# Patient Record
Sex: FEMALE | Race: WHITE | NOT HISPANIC OR LATINO | URBAN - METROPOLITAN AREA
[De-identification: names, ages, dates, MRNs, and addresses within clinical notes are randomized per-mention and may not be internally consistent; named-entity substitution may affect disease eponyms.]

---

## 2017-04-19 ENCOUNTER — EMERGENCY (EMERGENCY)
Facility: HOSPITAL | Age: 82
LOS: 1 days | Discharge: HOME | End: 2017-04-19
Admitting: EMERGENCY MEDICINE
Payer: MEDICARE

## 2017-04-19 PROCEDURE — 70450 CT HEAD/BRAIN W/O DYE: CPT

## 2017-04-19 PROCEDURE — 99284 EMERGENCY DEPT VISIT MOD MDM: CPT | Mod: 25

## 2017-04-19 PROCEDURE — 12001 RPR S/N/AX/GEN/TRNK 2.5CM/<: CPT

## 2017-04-19 PROCEDURE — 70450 CT HEAD/BRAIN W/O DYE: CPT | Mod: 26

## 2017-04-19 PROCEDURE — 82962 GLUCOSE BLOOD TEST: CPT

## 2017-05-27 ENCOUNTER — INPATIENT (INPATIENT)
Facility: HOSPITAL | Age: 82
LOS: 4 days | Discharge: NURSING, REHAB OR LONG-TERM CARE | DRG: 554 | End: 2017-06-01
Attending: FAMILY MEDICINE | Admitting: INTERNAL MEDICINE
Payer: MEDICARE

## 2017-05-27 DIAGNOSIS — M19.90 UNSPECIFIED OSTEOARTHRITIS, UNSPECIFIED SITE: ICD-10-CM

## 2017-05-27 DIAGNOSIS — I10 ESSENTIAL (PRIMARY) HYPERTENSION: ICD-10-CM

## 2017-05-27 DIAGNOSIS — R50.9 FEVER, UNSPECIFIED: ICD-10-CM

## 2017-05-27 DIAGNOSIS — H40.9 UNSPECIFIED GLAUCOMA: ICD-10-CM

## 2017-05-27 DIAGNOSIS — F32.9 MAJOR DEPRESSIVE DISORDER, SINGLE EPISODE, UNSPECIFIED: ICD-10-CM

## 2017-05-27 DIAGNOSIS — R63.0 ANOREXIA: ICD-10-CM

## 2017-05-27 DIAGNOSIS — F03.90 UNSPECIFIED DEMENTIA WITHOUT BEHAVIORAL DISTURBANCE: ICD-10-CM

## 2017-05-27 DIAGNOSIS — I25.10 ATHEROSCLEROTIC HEART DISEASE OF NATIVE CORONARY ARTERY WITHOUT ANGINA PECTORIS: ICD-10-CM

## 2017-05-27 DIAGNOSIS — E11.9 TYPE 2 DIABETES MELLITUS WITHOUT COMPLICATIONS: ICD-10-CM

## 2017-05-27 DIAGNOSIS — M00.9 PYOGENIC ARTHRITIS, UNSPECIFIED: ICD-10-CM

## 2017-05-27 DIAGNOSIS — Z88.0 ALLERGY STATUS TO PENICILLIN: ICD-10-CM

## 2017-05-27 PROCEDURE — 99223 1ST HOSP IP/OBS HIGH 75: CPT

## 2017-05-27 PROCEDURE — 71010: CPT | Mod: 26

## 2017-05-27 PROCEDURE — 93010 ELECTROCARDIOGRAM REPORT: CPT

## 2017-05-27 PROCEDURE — 73562 X-RAY EXAM OF KNEE 3: CPT | Mod: 26,RT

## 2017-05-28 PROCEDURE — 99233 SBSQ HOSP IP/OBS HIGH 50: CPT

## 2017-05-29 PROCEDURE — 99232 SBSQ HOSP IP/OBS MODERATE 35: CPT

## 2017-05-30 PROCEDURE — 99232 SBSQ HOSP IP/OBS MODERATE 35: CPT

## 2017-05-30 PROCEDURE — 73562 X-RAY EXAM OF KNEE 3: CPT | Mod: 26,RT

## 2017-05-31 PROCEDURE — 20610 DRAIN/INJ JOINT/BURSA W/O US: CPT | Mod: RT

## 2017-05-31 PROCEDURE — 99232 SBSQ HOSP IP/OBS MODERATE 35: CPT

## 2017-06-01 PROCEDURE — 92507 TX SP LANG VOICE COMM INDIV: CPT

## 2017-06-01 PROCEDURE — 80048 BASIC METABOLIC PNL TOTAL CA: CPT

## 2017-06-01 PROCEDURE — 85025 COMPLETE CBC W/AUTO DIFF WBC: CPT

## 2017-06-01 PROCEDURE — 85652 RBC SED RATE AUTOMATED: CPT

## 2017-06-01 PROCEDURE — 71045 X-RAY EXAM CHEST 1 VIEW: CPT

## 2017-06-01 PROCEDURE — 83036 HEMOGLOBIN GLYCOSYLATED A1C: CPT

## 2017-06-01 PROCEDURE — 87070 CULTURE OTHR SPECIMN AEROBIC: CPT

## 2017-06-01 PROCEDURE — 86140 C-REACTIVE PROTEIN: CPT

## 2017-06-01 PROCEDURE — 96367 TX/PROPH/DG ADDL SEQ IV INF: CPT

## 2017-06-01 PROCEDURE — 85610 PROTHROMBIN TIME: CPT

## 2017-06-01 PROCEDURE — 85730 THROMBOPLASTIN TIME PARTIAL: CPT

## 2017-06-01 PROCEDURE — 97166 OT EVAL MOD COMPLEX 45 MIN: CPT

## 2017-06-01 PROCEDURE — 96365 THER/PROPH/DIAG IV INF INIT: CPT

## 2017-06-01 PROCEDURE — 89060 EXAM SYNOVIAL FLUID CRYSTALS: CPT

## 2017-06-01 PROCEDURE — 73562 X-RAY EXAM OF KNEE 3: CPT

## 2017-06-01 PROCEDURE — 81003 URINALYSIS AUTO W/O SCOPE: CPT

## 2017-06-01 PROCEDURE — 85027 COMPLETE CBC AUTOMATED: CPT

## 2017-06-01 PROCEDURE — 99232 SBSQ HOSP IP/OBS MODERATE 35: CPT

## 2017-06-01 PROCEDURE — 87040 BLOOD CULTURE FOR BACTERIA: CPT

## 2017-06-01 PROCEDURE — 87075 CULTR BACTERIA EXCEPT BLOOD: CPT

## 2017-06-01 PROCEDURE — 99285 EMERGENCY DEPT VISIT HI MDM: CPT | Mod: 25

## 2017-06-01 PROCEDURE — 83605 ASSAY OF LACTIC ACID: CPT

## 2017-06-01 PROCEDURE — 87205 SMEAR GRAM STAIN: CPT

## 2017-06-01 PROCEDURE — 87086 URINE CULTURE/COLONY COUNT: CPT

## 2017-06-01 PROCEDURE — 93005 ELECTROCARDIOGRAM TRACING: CPT

## 2017-06-01 PROCEDURE — 80069 RENAL FUNCTION PANEL: CPT

## 2017-06-01 PROCEDURE — 81002 URINALYSIS NONAUTO W/O SCOPE: CPT

## 2017-06-01 PROCEDURE — 97161 PT EVAL LOW COMPLEX 20 MIN: CPT

## 2017-06-01 PROCEDURE — 89051 BODY FLUID CELL COUNT: CPT

## 2017-08-21 ENCOUNTER — EMERGENCY (EMERGENCY)
Facility: HOSPITAL | Age: 82
LOS: 0 days | Discharge: NURSING, REHAB OR LONG-TERM CARE | End: 2017-08-21
Attending: EMERGENCY MEDICINE
Payer: MEDICARE

## 2017-08-21 VITALS
OXYGEN SATURATION: 97 % | DIASTOLIC BLOOD PRESSURE: 57 MMHG | HEART RATE: 60 BPM | SYSTOLIC BLOOD PRESSURE: 114 MMHG | TEMPERATURE: 98 F | RESPIRATION RATE: 17 BRPM

## 2017-08-21 VITALS
TEMPERATURE: 97 F | DIASTOLIC BLOOD PRESSURE: 82 MMHG | SYSTOLIC BLOOD PRESSURE: 189 MMHG | HEART RATE: 66 BPM | HEIGHT: 64 IN | WEIGHT: 119.93 LBS | OXYGEN SATURATION: 100 % | RESPIRATION RATE: 17 BRPM

## 2017-08-21 PROCEDURE — 70450 CT HEAD/BRAIN W/O DYE: CPT | Mod: 26

## 2017-08-21 PROCEDURE — 73562 X-RAY EXAM OF KNEE 3: CPT | Mod: 26,RT

## 2017-08-21 PROCEDURE — 72125 CT NECK SPINE W/O DYE: CPT | Mod: 26

## 2017-08-21 PROCEDURE — 99284 EMERGENCY DEPT VISIT MOD MDM: CPT

## 2017-08-21 PROCEDURE — 73502 X-RAY EXAM HIP UNI 2-3 VIEWS: CPT | Mod: 26,RT

## 2017-08-21 PROCEDURE — 71010: CPT | Mod: 26

## 2017-08-21 RX ORDER — ACETAMINOPHEN 500 MG
975 TABLET ORAL ONCE
Qty: 0 | Refills: 0 | Status: COMPLETED | OUTPATIENT
Start: 2017-08-21 | End: 2017-08-21

## 2017-08-21 RX ORDER — ACETAMINOPHEN 500 MG
2 TABLET ORAL
Qty: 56 | Refills: 0 | OUTPATIENT
Start: 2017-08-21 | End: 2017-08-28

## 2017-08-21 RX ORDER — TRAMADOL HYDROCHLORIDE 50 MG/1
1 TABLET ORAL
Qty: 12 | Refills: 0 | OUTPATIENT
Start: 2017-08-21 | End: 2017-08-24

## 2017-08-21 RX ADMIN — Medication 0.1 MILLIGRAM(S): at 06:21

## 2017-08-21 RX ADMIN — Medication 975 MILLIGRAM(S): at 05:35

## 2017-08-21 NOTE — ED ADULT NURSE REASSESSMENT NOTE - NS ED NURSE REASSESS COMMENT FT1
Patient at baseline, [facility] called and report was given to Nayely.  setting up transportation for patient to go back.

## 2017-08-21 NOTE — ED ADULT NURSE NOTE - OBJECTIVE STATEMENT
Pt states she fell forward out of the chair. The pt is not alert and oriented upon interview, so there may be some inaccuracy about the mechanism of injury and story that the patient is providing. The pt is alert and is oriented, but only A&OX2.

## 2017-08-21 NOTE — ED PROVIDER NOTE - PMH
Age Related Macular Degeneration  2008  Chronic Bronchitis with Emphysema  2009  Congestive Heart Failure  1997, 1999  Dementia    Diabetes Mellitus Type II    Fracture Lumbar Vertebra-Closed  8/2009  Glaucoma  bilateral on medication  Hyperlipidemia  1997  Myocardial Infarct, Old  1997  Osteoarthritis

## 2017-08-21 NOTE — ED PROVIDER NOTE - PSH
Colon Obstruction  1983 colon resection , benign  History of Hysterectomy  1975  History of Tonsillectomy  child  Ovarian Cyst  excision ovarian cyst 1970's

## 2017-08-21 NOTE — ED PROVIDER NOTE - OBJECTIVE STATEMENT
89 year old female presenting from Assisted living s/p fall - complaining of R hip discomfort/pain, otherwise found on floor from bedside at facility. Pt otherwise with hx of HLD, MI, DM II , CHF, dementia, presenting to ED due to fall, was found on ground.

## 2017-08-21 NOTE — ED PROVIDER NOTE - MUSCULOSKELETAL, MLM
Spine appears normal, range of motion is not limited, R hip with mild tenderness on flexion, no point tenderness, ROM of knee on R normal, left side without any
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
## 2017-08-22 DIAGNOSIS — M25.551 PAIN IN RIGHT HIP: ICD-10-CM

## 2017-08-22 DIAGNOSIS — M47.9 SPONDYLOSIS, UNSPECIFIED: ICD-10-CM

## 2017-08-22 DIAGNOSIS — I50.9 HEART FAILURE, UNSPECIFIED: ICD-10-CM

## 2017-08-22 DIAGNOSIS — Z91.81 HISTORY OF FALLING: ICD-10-CM

## 2017-08-22 DIAGNOSIS — F03.90 UNSPECIFIED DEMENTIA, UNSPECIFIED SEVERITY, WITHOUT BEHAVIORAL DISTURBANCE, PSYCHOTIC DISTURBANCE, MOOD DISTURBANCE, AND ANXIETY: ICD-10-CM

## 2017-08-22 DIAGNOSIS — Z88.0 ALLERGY STATUS TO PENICILLIN: ICD-10-CM
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
## 2017-08-22 DIAGNOSIS — J43.9 EMPHYSEMA, UNSPECIFIED: ICD-10-CM

## 2017-08-22 DIAGNOSIS — S70.01XA CONTUSION OF RIGHT HIP, INITIAL ENCOUNTER: ICD-10-CM

## 2017-08-22 DIAGNOSIS — E78.5 HYPERLIPIDEMIA, UNSPECIFIED: ICD-10-CM

## 2017-08-22 DIAGNOSIS — H40.9 UNSPECIFIED GLAUCOMA: ICD-10-CM

## 2017-11-06 ENCOUNTER — EMERGENCY (EMERGENCY)
Facility: HOSPITAL | Age: 82
LOS: 0 days | Discharge: HOME | End: 2017-11-06
Attending: EMERGENCY MEDICINE
Payer: MEDICARE

## 2017-11-06 VITALS
WEIGHT: 95.02 LBS | DIASTOLIC BLOOD PRESSURE: 84 MMHG | HEART RATE: 109 BPM | TEMPERATURE: 99 F | OXYGEN SATURATION: 98 % | SYSTOLIC BLOOD PRESSURE: 132 MMHG | RESPIRATION RATE: 16 BRPM | HEIGHT: 60 IN

## 2017-11-06 VITALS
DIASTOLIC BLOOD PRESSURE: 64 MMHG | RESPIRATION RATE: 17 BRPM | OXYGEN SATURATION: 97 % | HEART RATE: 65 BPM | TEMPERATURE: 98 F | SYSTOLIC BLOOD PRESSURE: 128 MMHG

## 2017-11-06 DIAGNOSIS — F32.9 MAJOR DEPRESSIVE DISORDER, SINGLE EPISODE, UNSPECIFIED: ICD-10-CM

## 2017-11-06 DIAGNOSIS — Z79.1 LONG TERM (CURRENT) USE OF NON-STEROIDAL ANTI-INFLAMMATORIES (NSAID): ICD-10-CM

## 2017-11-06 DIAGNOSIS — Z88.0 ALLERGY STATUS TO PENICILLIN: ICD-10-CM

## 2017-11-06 DIAGNOSIS — M25.552 PAIN IN LEFT HIP: ICD-10-CM

## 2017-11-06 DIAGNOSIS — M25.551 PAIN IN RIGHT HIP: ICD-10-CM

## 2017-11-06 DIAGNOSIS — H40.9 UNSPECIFIED GLAUCOMA: ICD-10-CM
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
## 2017-11-06 DIAGNOSIS — I10 ESSENTIAL (PRIMARY) HYPERTENSION: ICD-10-CM

## 2017-11-06 LAB
ALBUMIN SERPL ELPH-MCNC: 2.9 G/DL — LOW (ref 3.3–5)
ALP SERPL-CCNC: 104 U/L — SIGNIFICANT CHANGE UP (ref 40–120)
ALT FLD-CCNC: 18 U/L — SIGNIFICANT CHANGE UP (ref 12–78)
ANION GAP SERPL CALC-SCNC: 7 MMOL/L — SIGNIFICANT CHANGE UP (ref 5–17)
AST SERPL-CCNC: 20 U/L — SIGNIFICANT CHANGE UP (ref 15–37)
BILIRUB SERPL-MCNC: 0.5 MG/DL — SIGNIFICANT CHANGE UP (ref 0.2–1.2)
BUN SERPL-MCNC: 25 MG/DL — HIGH (ref 7–23)
CALCIUM SERPL-MCNC: 9.4 MG/DL — SIGNIFICANT CHANGE UP (ref 8.5–10.1)
CHLORIDE SERPL-SCNC: 108 MMOL/L — SIGNIFICANT CHANGE UP (ref 96–108)
CO2 SERPL-SCNC: 26 MMOL/L — SIGNIFICANT CHANGE UP (ref 22–31)
CREAT SERPL-MCNC: 1.27 MG/DL — SIGNIFICANT CHANGE UP (ref 0.5–1.3)
CRP SERPL-MCNC: 0.8 MG/DL — HIGH (ref 0–0.4)
ERYTHROCYTE [SEDIMENTATION RATE] IN BLOOD: 28 MM/HR — HIGH (ref 0–20)
GLUCOSE BLDC GLUCOMTR-MCNC: 105 MG/DL — HIGH (ref 70–99)
GLUCOSE SERPL-MCNC: 111 MG/DL — HIGH (ref 70–99)
HCT VFR BLD CALC: 36.6 % — SIGNIFICANT CHANGE UP (ref 34.5–45)
HGB BLD-MCNC: 12.3 G/DL — SIGNIFICANT CHANGE UP (ref 11.5–15.5)
LACTATE SERPL-SCNC: 0.9 MMOL/L — SIGNIFICANT CHANGE UP (ref 0.7–2)
LIDOCAIN IGE QN: 180 U/L — SIGNIFICANT CHANGE UP (ref 73–393)
MCHC RBC-ENTMCNC: 31 PG — SIGNIFICANT CHANGE UP (ref 27–34)
MCHC RBC-ENTMCNC: 33.6 GM/DL — SIGNIFICANT CHANGE UP (ref 32–36)
MCV RBC AUTO: 92.5 FL — SIGNIFICANT CHANGE UP (ref 80–100)
PLATELET # BLD AUTO: 187 K/UL — SIGNIFICANT CHANGE UP (ref 150–400)
POTASSIUM SERPL-MCNC: 4.2 MMOL/L — SIGNIFICANT CHANGE UP (ref 3.5–5.3)
POTASSIUM SERPL-SCNC: 4.2 MMOL/L — SIGNIFICANT CHANGE UP (ref 3.5–5.3)
PROT SERPL-MCNC: 6.5 GM/DL — SIGNIFICANT CHANGE UP (ref 6–8.3)
RBC # BLD: 3.96 M/UL — SIGNIFICANT CHANGE UP (ref 3.8–5.2)
RBC # FLD: 11.6 % — SIGNIFICANT CHANGE UP (ref 11–15)
SODIUM SERPL-SCNC: 141 MMOL/L — SIGNIFICANT CHANGE UP (ref 135–145)
WBC # BLD: 9.5 K/UL — SIGNIFICANT CHANGE UP (ref 3.8–10.5)
WBC # FLD AUTO: 9.5 K/UL — SIGNIFICANT CHANGE UP (ref 3.8–10.5)

## 2017-11-06 PROCEDURE — 74177 CT ABD & PELVIS W/CONTRAST: CPT | Mod: 26

## 2017-11-06 PROCEDURE — 76377 3D RENDER W/INTRP POSTPROCES: CPT | Mod: 26

## 2017-11-06 PROCEDURE — 76377 3D RENDER W/INTRP POSTPROCES: CPT | Mod: 26,77

## 2017-11-06 PROCEDURE — 70450 CT HEAD/BRAIN W/O DYE: CPT | Mod: 26

## 2017-11-06 PROCEDURE — 72125 CT NECK SPINE W/O DYE: CPT | Mod: 26

## 2017-11-06 PROCEDURE — 73700 CT LOWER EXTREMITY W/O DYE: CPT | Mod: 26,50

## 2017-11-06 PROCEDURE — 99284 EMERGENCY DEPT VISIT MOD MDM: CPT

## 2017-11-06 RX ORDER — ACETAMINOPHEN 500 MG
650 TABLET ORAL ONCE
Qty: 0 | Refills: 0 | Status: COMPLETED | OUTPATIENT
Start: 2017-11-06 | End: 2017-11-06

## 2017-11-06 RX ADMIN — Medication 650 MILLIGRAM(S): at 14:43

## 2017-11-06 RX ADMIN — Medication 650 MILLIGRAM(S): at 08:07

## 2017-11-06 RX ADMIN — Medication 650 MILLIGRAM(S): at 08:19

## 2017-11-06 NOTE — ED PROVIDER NOTE - OBJECTIVE STATEMENT
89 yo F with b/l hip pain, headache, neck pain after suspected fall.  Pt. presents from [facility] after fall out of bed, as per NH paperwork.  Pt. has no other specific complaints.  She cannot give full history as to what happened.    ROS: negative for fever, cough, headache, chest pain, shortness of breath, abd pain, nausea, vomiting, diarrhea, rash, paresthesia, and weakness.   PMH: HTN, glaucoma, depression, gerd, chronic pain; Meds: asa 81, celecoxib 200, enalapril 20, latanoprost, mirtazapine 15, pantoprazole, tramadol 50 mg, Tylenol 325; SH: Denies smoking/drinking/drug use

## 2017-11-06 NOTE — ED PROVIDER NOTE - PROGRESS NOTE DETAILS
CT hips, brain, cs, grossly negative  incidental fluid collection in patients abd, will get labs and CT abd/pel + for further imaging Results reported to patient--grossly benign, fluid collection is renal cyst  Pt. reports feeling better after meds; legal guardian at bedside, informed of results  pt. agrees to f/u with primary care outpt.  pt. understands to return to ED if symptoms worsen; will d/c

## 2017-11-06 NOTE — ED ADULT TRIAGE NOTE - CHIEF COMPLAINT QUOTE
Found under the bed in assisted living, left hip and back pain, unknown LOC, DNR, quiet not answering questions

## 2017-11-06 NOTE — ED ADULT NURSE NOTE - OBJECTIVE STATEMENT
Found under the bed in assisted living, left hip and back pain, unknown LOC, DNR, patient c/o left hip and lower back pain.

## 2017-11-06 NOTE — ED PROVIDER NOTE - MEDICAL DECISION MAKING DETAILS
89 yo F with suspected fall from bed, pt. has pain  -ct brain, cs, hips b/l, Tylenol  -reeval, d/c to nursing facility when clear

## 2017-11-06 NOTE — ED PROVIDER NOTE - CARE PLAN
Principal Discharge DX:	Fall from bed, initial encounter Principal Discharge DX:	Pain of both hip joints

## 2017-11-06 NOTE — ED ADULT NURSE REASSESSMENT NOTE - NS ED NURSE REASSESS COMMENT FT1
pt noted to have an unstageable wound  below the left knee, with +drainage brownish drainage, rt shin with stirry strip x2, +ecchymosis to the b/l legs, +sacral redness noted
Pt Marisol Suarez, [phone], cell  [phone] at bedside , s/p ct awaiting results , cathiena made aware of plan
Pt left via wheelchair with senior care transporation no diostress
awaiting transporattion v/s done attempted to feed pt refused,  FS done (see results)
pt d/c awaiting transportation.

## 2017-11-06 NOTE — ED PROVIDER NOTE - PHYSICAL EXAMINATION
Vitals: tachy 109, otherwise WNL  Gen: AAOx1, NAD, sitting comfortably in stretcher, moving all 4 ext  Head: ncat, perrla, eomi b/l  Neck: supple, no lymphadenopathy, no midline deviation  Heart: rrr, no m/r/g  Lungs: CTA b/l, no rales/ronchi/wheezes  Abd: soft, nontender, distended, but not tense--likely abd hernia, no rebound or guarding  Ext: no clubbing/cyanosis/edema  Neuro: sensation and muscle strength intact b/l

## 2018-03-19 ENCOUNTER — INPATIENT (INPATIENT)
Facility: HOSPITAL | Age: 83
LOS: 6 days | Discharge: HOME | End: 2018-03-26
Attending: INTERNAL MEDICINE | Admitting: HOSPITALIST
Payer: MEDICARE

## 2018-03-19 VITALS
OXYGEN SATURATION: 93 % | HEIGHT: 64 IN | SYSTOLIC BLOOD PRESSURE: 102 MMHG | HEART RATE: 114 BPM | DIASTOLIC BLOOD PRESSURE: 52 MMHG | WEIGHT: 110.01 LBS | RESPIRATION RATE: 18 BRPM

## 2018-03-19 LAB
ALBUMIN SERPL ELPH-MCNC: 3 G/DL — LOW (ref 3.3–5)
ALP SERPL-CCNC: 204 U/L — HIGH (ref 40–120)
ALT FLD-CCNC: 55 U/L — SIGNIFICANT CHANGE UP (ref 12–78)
ANION GAP SERPL CALC-SCNC: 13 MMOL/L — SIGNIFICANT CHANGE UP (ref 5–17)
APPEARANCE UR: ABNORMAL
APTT BLD: 26.2 SEC — LOW (ref 27.5–37.4)
AST SERPL-CCNC: 70 U/L — HIGH (ref 15–37)
BACTERIA # UR AUTO: ABNORMAL
BILIRUB SERPL-MCNC: 0.4 MG/DL — SIGNIFICANT CHANGE UP (ref 0.2–1.2)
BILIRUB UR-MCNC: NEGATIVE — SIGNIFICANT CHANGE UP
BUN SERPL-MCNC: 37 MG/DL — HIGH (ref 7–23)
CALCIUM SERPL-MCNC: 9.5 MG/DL — SIGNIFICANT CHANGE UP (ref 8.5–10.1)
CHLORIDE SERPL-SCNC: 100 MMOL/L — SIGNIFICANT CHANGE UP (ref 96–108)
CO2 SERPL-SCNC: 23 MMOL/L — SIGNIFICANT CHANGE UP (ref 22–31)
COLOR SPEC: YELLOW — SIGNIFICANT CHANGE UP
CREAT SERPL-MCNC: 2.03 MG/DL — HIGH (ref 0.5–1.3)
DIFF PNL FLD: ABNORMAL
EPI CELLS # UR: SIGNIFICANT CHANGE UP
GLUCOSE SERPL-MCNC: 242 MG/DL — HIGH (ref 70–99)
GLUCOSE UR QL: NEGATIVE MG/DL — SIGNIFICANT CHANGE UP
HCT VFR BLD CALC: 39.3 % — SIGNIFICANT CHANGE UP (ref 34.5–45)
HGB BLD-MCNC: 12.9 G/DL — SIGNIFICANT CHANGE UP (ref 11.5–15.5)
INR BLD: 0.98 RATIO — SIGNIFICANT CHANGE UP (ref 0.88–1.16)
KETONES UR-MCNC: NEGATIVE — SIGNIFICANT CHANGE UP
LACTATE SERPL-SCNC: 6.8 MMOL/L — CRITICAL HIGH (ref 0.7–2)
LEUKOCYTE ESTERASE UR-ACNC: ABNORMAL
LIDOCAIN IGE QN: 275 U/L — SIGNIFICANT CHANGE UP (ref 73–393)
MCHC RBC-ENTMCNC: 30.8 PG — SIGNIFICANT CHANGE UP (ref 27–34)
MCHC RBC-ENTMCNC: 32.8 GM/DL — SIGNIFICANT CHANGE UP (ref 32–36)
MCV RBC AUTO: 93.8 FL — SIGNIFICANT CHANGE UP (ref 80–100)
NITRITE UR-MCNC: NEGATIVE — SIGNIFICANT CHANGE UP
PH UR: 7 — SIGNIFICANT CHANGE UP (ref 5–8)
PLATELET # BLD AUTO: 387 K/UL — SIGNIFICANT CHANGE UP (ref 150–400)
POTASSIUM SERPL-MCNC: 4.6 MMOL/L — SIGNIFICANT CHANGE UP (ref 3.5–5.3)
POTASSIUM SERPL-SCNC: 4.6 MMOL/L — SIGNIFICANT CHANGE UP (ref 3.5–5.3)
PROT SERPL-MCNC: 7.6 GM/DL — SIGNIFICANT CHANGE UP (ref 6–8.3)
PROT UR-MCNC: 30 MG/DL
PROTHROM AB SERPL-ACNC: 10.7 SEC — SIGNIFICANT CHANGE UP (ref 9.8–12.7)
RBC # BLD: 4.19 M/UL — SIGNIFICANT CHANGE UP (ref 3.8–5.2)
RBC # FLD: 13.2 % — SIGNIFICANT CHANGE UP (ref 10.3–14.5)
RBC CASTS # UR COMP ASSIST: ABNORMAL /HPF (ref 0–4)
SODIUM SERPL-SCNC: 136 MMOL/L — SIGNIFICANT CHANGE UP (ref 135–145)
SP GR SPEC: 1.01 — SIGNIFICANT CHANGE UP (ref 1.01–1.02)
UROBILINOGEN FLD QL: NEGATIVE MG/DL — SIGNIFICANT CHANGE UP
WBC # BLD: 24.13 K/UL — HIGH (ref 3.8–10.5)
WBC # FLD AUTO: 24.13 K/UL — HIGH (ref 3.8–10.5)
WBC UR QL: >50

## 2018-03-19 PROCEDURE — 71045 X-RAY EXAM CHEST 1 VIEW: CPT | Mod: 26

## 2018-03-19 PROCEDURE — 99285 EMERGENCY DEPT VISIT HI MDM: CPT

## 2018-03-19 RX ORDER — SODIUM CHLORIDE 9 MG/ML
1000 INJECTION INTRAMUSCULAR; INTRAVENOUS; SUBCUTANEOUS ONCE
Qty: 0 | Refills: 0 | Status: COMPLETED | OUTPATIENT
Start: 2018-03-19 | End: 2018-03-19

## 2018-03-19 RX ORDER — VANCOMYCIN HCL 1 G
1000 VIAL (EA) INTRAVENOUS ONCE
Qty: 0 | Refills: 0 | Status: COMPLETED | OUTPATIENT
Start: 2018-03-19 | End: 2018-03-20

## 2018-03-19 RX ORDER — METRONIDAZOLE 500 MG
500 TABLET ORAL ONCE
Qty: 0 | Refills: 0 | Status: COMPLETED | OUTPATIENT
Start: 2018-03-19 | End: 2018-03-19

## 2018-03-19 RX ORDER — ONDANSETRON 8 MG/1
4 TABLET, FILM COATED ORAL ONCE
Qty: 0 | Refills: 0 | Status: COMPLETED | OUTPATIENT
Start: 2018-03-19 | End: 2018-03-19

## 2018-03-19 RX ADMIN — SODIUM CHLORIDE 1000 MILLILITER(S): 9 INJECTION INTRAMUSCULAR; INTRAVENOUS; SUBCUTANEOUS at 22:06

## 2018-03-19 RX ADMIN — ONDANSETRON 4 MILLIGRAM(S): 8 TABLET, FILM COATED ORAL at 22:06

## 2018-03-19 NOTE — ED PROVIDER NOTE - CARE PLAN
Principal Discharge DX:	Diarrhea  Secondary Diagnosis:	UTI (urinary tract infection)  Secondary Diagnosis:	Pneumonia

## 2018-03-19 NOTE — ED PROVIDER NOTE - OBJECTIVE STATEMENT
Pertinent PMH/PSH/FHx/SHx and Review of Systems contained within:  Patient presents to the ED for vomiting and diarrhea.  Patient is a limited historian, comes from [facility] for nonbloody, nonbilious vomiting and diarrhea.  Complains of headache and abdominal pain, denies additional complaint.  On chart review, patient does not seem to be on antibiotics, unclear if she had recent abx.  Has diaper with watery yellow foul smelling stool.    Relevant PMHx/SHx/SOCHx/FAMH:  Anxiety, lower back pain, LBBB, dementia, glaucoma, DM, HTN, CAD, OA, macular degeneration  Patient denies EtOH/tobacco/illicit substance use.

## 2018-03-19 NOTE — ED ADULT NURSE NOTE - CHIEF COMPLAINT QUOTE
BIBA,  sent from [facility] [place], c/o vomiting and diarrhea since 5pm after eating.  PMH - dementia, pt was combative with EMS

## 2018-03-19 NOTE — ED PROVIDER NOTE - MEDICAL DECISION MAKING DETAILS
Patient with diarrhea, WBC 24k.  C Diff pending.  Also has UTI and possible Right lung infection.  Patient pancultured and receiving abx.  Patient is to be admitted to the hospital and the case was discussed with the admitting physician.  Any changes in plan, additional imaging/labs, and further work up will be at the discretion of the admitting physician.

## 2018-03-19 NOTE — ED PROVIDER NOTE - PHYSICAL EXAMINATION
Gen: Awake, NAD, well appearing  Head: NC, AT, PERRL, EOMI, normal lids/conjunctiva  ENT: normal hearing, patent oropharynx without erythema/exudate, dry mucus membranes, uvula midline  Neck: +supple, no tenderness/meningismus/JVD, +Trachea midline  Pulm: Bilateral BS, normal resp effort, no wheeze/stridor/retractions  CV: RRR, no M/R/G, +dist pulses  Abd: soft, ND, +hyperactive BS, old midline surgical scar, +yellow stool in diaper  Mskel: no edema/erythema/cyanosis  Skin: no rash, warm/dry  Neuro: No sensory/motor deficits, CN 2-12 intact

## 2018-03-19 NOTE — ED ADULT TRIAGE NOTE - CHIEF COMPLAINT QUOTE
BIBA,  sent from [facility] [place], c/o vomiting and diarrhea since 5pm after eating.  PMH - dementia, pt was combative with EMS

## 2018-03-20 DIAGNOSIS — N17.9 ACUTE KIDNEY FAILURE, UNSPECIFIED: ICD-10-CM

## 2018-03-20 DIAGNOSIS — J18.1 LOBAR PNEUMONIA, UNSPECIFIED ORGANISM: ICD-10-CM

## 2018-03-20 DIAGNOSIS — J44.9 CHRONIC OBSTRUCTIVE PULMONARY DISEASE, UNSPECIFIED: ICD-10-CM

## 2018-03-20 DIAGNOSIS — R19.7 DIARRHEA, UNSPECIFIED: ICD-10-CM

## 2018-03-20 DIAGNOSIS — I50.9 HEART FAILURE, UNSPECIFIED: ICD-10-CM

## 2018-03-20 DIAGNOSIS — E11.65 TYPE 2 DIABETES MELLITUS WITH HYPERGLYCEMIA: ICD-10-CM

## 2018-03-20 DIAGNOSIS — E78.2 MIXED HYPERLIPIDEMIA: ICD-10-CM

## 2018-03-20 DIAGNOSIS — F03.90 UNSPECIFIED DEMENTIA WITHOUT BEHAVIORAL DISTURBANCE: ICD-10-CM

## 2018-03-20 DIAGNOSIS — Z29.9 ENCOUNTER FOR PROPHYLACTIC MEASURES, UNSPECIFIED: ICD-10-CM

## 2018-03-20 DIAGNOSIS — M19.90 UNSPECIFIED OSTEOARTHRITIS, UNSPECIFIED SITE: ICD-10-CM

## 2018-03-20 LAB
ANION GAP SERPL CALC-SCNC: 9 MMOL/L — SIGNIFICANT CHANGE UP (ref 5–17)
BASOPHILS # BLD AUTO: 0.1 K/UL — SIGNIFICANT CHANGE UP (ref 0–0.2)
BASOPHILS NFR BLD AUTO: 0.4 % — SIGNIFICANT CHANGE UP (ref 0–2)
BUN SERPL-MCNC: 38 MG/DL — HIGH (ref 7–23)
C DIFF BY PCR RESULT: SIGNIFICANT CHANGE UP
C DIFF TOX GENS STL QL NAA+PROBE: SIGNIFICANT CHANGE UP
CALCIUM SERPL-MCNC: 9 MG/DL — SIGNIFICANT CHANGE UP (ref 8.5–10.1)
CHLORIDE SERPL-SCNC: 105 MMOL/L — SIGNIFICANT CHANGE UP (ref 96–108)
CO2 SERPL-SCNC: 24 MMOL/L — SIGNIFICANT CHANGE UP (ref 22–31)
CREAT SERPL-MCNC: 1.94 MG/DL — HIGH (ref 0.5–1.3)
EOSINOPHIL # BLD AUTO: 0.03 K/UL — SIGNIFICANT CHANGE UP (ref 0–0.5)
EOSINOPHIL NFR BLD AUTO: 0.1 % — SIGNIFICANT CHANGE UP (ref 0–6)
GLUCOSE SERPL-MCNC: 243 MG/DL — HIGH (ref 70–99)
HCT VFR BLD CALC: 33.9 % — LOW (ref 34.5–45)
HGB BLD-MCNC: 11.2 G/DL — LOW (ref 11.5–15.5)
IMM GRANULOCYTES NFR BLD AUTO: 0.6 % — SIGNIFICANT CHANGE UP (ref 0–1.5)
LACTATE SERPL-SCNC: 2.5 MMOL/L — HIGH (ref 0.7–2)
LACTATE SERPL-SCNC: 3.3 MMOL/L — HIGH (ref 0.7–2)
LACTATE SERPL-SCNC: 4.2 MMOL/L — CRITICAL HIGH (ref 0.7–2)
LG PLATELETS BLD QL AUTO: SLIGHT — SIGNIFICANT CHANGE UP
LYMPHOCYTES # BLD AUTO: 1.16 K/UL — SIGNIFICANT CHANGE UP (ref 1–3.3)
LYMPHOCYTES # BLD AUTO: 4.8 % — LOW (ref 13–44)
MANUAL SMEAR VERIFICATION: SIGNIFICANT CHANGE UP
MCHC RBC-ENTMCNC: 30.9 PG — SIGNIFICANT CHANGE UP (ref 27–34)
MCHC RBC-ENTMCNC: 33 GM/DL — SIGNIFICANT CHANGE UP (ref 32–36)
MCV RBC AUTO: 93.4 FL — SIGNIFICANT CHANGE UP (ref 80–100)
MONOCYTES # BLD AUTO: 1.11 K/UL — HIGH (ref 0–0.9)
MONOCYTES NFR BLD AUTO: 4.6 % — SIGNIFICANT CHANGE UP (ref 2–14)
NEUTROPHILS # BLD AUTO: 21.59 K/UL — HIGH (ref 1.8–7.4)
NEUTROPHILS NFR BLD AUTO: 89.5 % — HIGH (ref 43–77)
NRBC # BLD: 0 /100 WBCS — SIGNIFICANT CHANGE UP (ref 0–0)
NRBC # BLD: 0 /100 WBCS — SIGNIFICANT CHANGE UP (ref 0–0)
PLAT MORPH BLD: NORMAL — SIGNIFICANT CHANGE UP
PLATELET # BLD AUTO: 250 K/UL — SIGNIFICANT CHANGE UP (ref 150–400)
PLATELET CLUMP BLD QL SMEAR: ABNORMAL
POTASSIUM SERPL-MCNC: 5.7 MMOL/L — HIGH (ref 3.5–5.3)
POTASSIUM SERPL-SCNC: 5.7 MMOL/L — HIGH (ref 3.5–5.3)
RBC # BLD: 3.63 M/UL — LOW (ref 3.8–5.2)
RBC # FLD: 13.3 % — SIGNIFICANT CHANGE UP (ref 10.3–14.5)
RBC BLD AUTO: NORMAL — SIGNIFICANT CHANGE UP
SODIUM SERPL-SCNC: 138 MMOL/L — SIGNIFICANT CHANGE UP (ref 135–145)
TSH SERPL-MCNC: 2.6 UU/ML — SIGNIFICANT CHANGE UP (ref 0.36–3.74)
WBC # BLD: 28.1 K/UL — HIGH (ref 3.8–10.5)
WBC # FLD AUTO: 28.1 K/UL — HIGH (ref 3.8–10.5)

## 2018-03-20 PROCEDURE — 74176 CT ABD & PELVIS W/O CONTRAST: CPT | Mod: 26

## 2018-03-20 PROCEDURE — 12345: CPT | Mod: NC

## 2018-03-20 PROCEDURE — 93010 ELECTROCARDIOGRAM REPORT: CPT

## 2018-03-20 PROCEDURE — 70450 CT HEAD/BRAIN W/O DYE: CPT | Mod: 26

## 2018-03-20 PROCEDURE — 99223 1ST HOSP IP/OBS HIGH 75: CPT | Mod: AI

## 2018-03-20 RX ORDER — GLUCAGON INJECTION, SOLUTION 0.5 MG/.1ML
1 INJECTION, SOLUTION SUBCUTANEOUS ONCE
Qty: 0 | Refills: 0 | Status: DISCONTINUED | OUTPATIENT
Start: 2018-03-20 | End: 2018-03-26

## 2018-03-20 RX ORDER — DEXTROSE 50 % IN WATER 50 %
25 SYRINGE (ML) INTRAVENOUS ONCE
Qty: 0 | Refills: 0 | Status: DISCONTINUED | OUTPATIENT
Start: 2018-03-20 | End: 2018-03-26

## 2018-03-20 RX ORDER — SODIUM POLYSTYRENE SULFONATE 4.1 MEQ/G
30 POWDER, FOR SUSPENSION ORAL ONCE
Qty: 0 | Refills: 0 | Status: COMPLETED | OUTPATIENT
Start: 2018-03-20 | End: 2018-03-20

## 2018-03-20 RX ORDER — SODIUM CHLORIDE 9 MG/ML
1000 INJECTION INTRAMUSCULAR; INTRAVENOUS; SUBCUTANEOUS ONCE
Qty: 0 | Refills: 0 | Status: COMPLETED | OUTPATIENT
Start: 2018-03-20 | End: 2018-03-20

## 2018-03-20 RX ORDER — DEXTROSE 50 % IN WATER 50 %
12.5 SYRINGE (ML) INTRAVENOUS ONCE
Qty: 0 | Refills: 0 | Status: DISCONTINUED | OUTPATIENT
Start: 2018-03-20 | End: 2018-03-26

## 2018-03-20 RX ORDER — MIRTAZAPINE 45 MG/1
15 TABLET, ORALLY DISINTEGRATING ORAL AT BEDTIME
Qty: 0 | Refills: 0 | Status: DISCONTINUED | OUTPATIENT
Start: 2018-03-20 | End: 2018-03-26

## 2018-03-20 RX ORDER — DEXTROSE 50 % IN WATER 50 %
1 SYRINGE (ML) INTRAVENOUS ONCE
Qty: 0 | Refills: 0 | Status: DISCONTINUED | OUTPATIENT
Start: 2018-03-20 | End: 2018-03-26

## 2018-03-20 RX ORDER — SODIUM CHLORIDE 9 MG/ML
1000 INJECTION INTRAMUSCULAR; INTRAVENOUS; SUBCUTANEOUS
Qty: 0 | Refills: 0 | Status: DISCONTINUED | OUTPATIENT
Start: 2018-03-20 | End: 2018-03-24

## 2018-03-20 RX ORDER — VANCOMYCIN HCL 1 G
750 VIAL (EA) INTRAVENOUS
Qty: 0 | Refills: 0 | Status: DISCONTINUED | OUTPATIENT
Start: 2018-03-20 | End: 2018-03-22

## 2018-03-20 RX ORDER — HEPARIN SODIUM 5000 [USP'U]/ML
5000 INJECTION INTRAVENOUS; SUBCUTANEOUS EVERY 12 HOURS
Qty: 0 | Refills: 0 | Status: DISCONTINUED | OUTPATIENT
Start: 2018-03-20 | End: 2018-03-26

## 2018-03-20 RX ORDER — INSULIN LISPRO 100/ML
VIAL (ML) SUBCUTANEOUS
Qty: 0 | Refills: 0 | Status: DISCONTINUED | OUTPATIENT
Start: 2018-03-20 | End: 2018-03-26

## 2018-03-20 RX ORDER — IPRATROPIUM/ALBUTEROL SULFATE 18-103MCG
3 AEROSOL WITH ADAPTER (GRAM) INHALATION EVERY 6 HOURS
Qty: 0 | Refills: 0 | Status: DISCONTINUED | OUTPATIENT
Start: 2018-03-20 | End: 2018-03-26

## 2018-03-20 RX ORDER — SODIUM CHLORIDE 9 MG/ML
1000 INJECTION, SOLUTION INTRAVENOUS
Qty: 0 | Refills: 0 | Status: DISCONTINUED | OUTPATIENT
Start: 2018-03-20 | End: 2018-03-26

## 2018-03-20 RX ORDER — SODIUM POLYSTYRENE SULFONATE 4.1 MEQ/G
30 POWDER, FOR SUSPENSION ORAL ONCE
Qty: 0 | Refills: 0 | Status: DISCONTINUED | OUTPATIENT
Start: 2018-03-20 | End: 2018-03-20

## 2018-03-20 RX ORDER — VANCOMYCIN HCL 1 G
750 VIAL (EA) INTRAVENOUS DAILY
Qty: 0 | Refills: 0 | Status: DISCONTINUED | OUTPATIENT
Start: 2018-03-20 | End: 2018-03-20

## 2018-03-20 RX ADMIN — SODIUM CHLORIDE 75 MILLILITER(S): 9 INJECTION INTRAMUSCULAR; INTRAVENOUS; SUBCUTANEOUS at 11:47

## 2018-03-20 RX ADMIN — MIRTAZAPINE 15 MILLIGRAM(S): 45 TABLET, ORALLY DISINTEGRATING ORAL at 21:15

## 2018-03-20 RX ADMIN — HEPARIN SODIUM 5000 UNIT(S): 5000 INJECTION INTRAVENOUS; SUBCUTANEOUS at 17:01

## 2018-03-20 RX ADMIN — SODIUM POLYSTYRENE SULFONATE 30 GRAM(S): 4.1 POWDER, FOR SUSPENSION ORAL at 11:51

## 2018-03-20 RX ADMIN — SODIUM CHLORIDE 100 MILLILITER(S): 9 INJECTION INTRAMUSCULAR; INTRAVENOUS; SUBCUTANEOUS at 17:01

## 2018-03-20 RX ADMIN — SODIUM CHLORIDE 100 MILLILITER(S): 9 INJECTION INTRAMUSCULAR; INTRAVENOUS; SUBCUTANEOUS at 14:22

## 2018-03-20 RX ADMIN — SODIUM CHLORIDE 500 MILLILITER(S): 9 INJECTION INTRAMUSCULAR; INTRAVENOUS; SUBCUTANEOUS at 07:40

## 2018-03-20 RX ADMIN — Medication 250 MILLIGRAM(S): at 04:21

## 2018-03-20 RX ADMIN — Medication 100 MILLIGRAM(S): at 02:00

## 2018-03-20 NOTE — H&P ADULT - ASSESSMENT
89 y/o female w/advanced dementia, Anxiety, lower back pain,cad, chf, LBBB,  glaucoma, DM2 HTN, CAD, OA and macular degeneration sent from NH for  for non bilious vomiting w/pna

## 2018-03-20 NOTE — CONSULT NOTE ADULT - SUBJECTIVE AND OBJECTIVE BOX
Infectious Diseases - Attending at Dr. Marsh    HPI:  Pt is a 89 y/o female w/advanced dementia, Anxiety, lower back pain,cad, chf, LBBB,  glaucoma, DM2 HTN, CAD, OA and macular degeneration sent from NH for non bilious vomiting and diarrhea. Pt unable to contribute, earlier complained of ha now w/o complaints except need to urinate. (20 Mar 2018 04:10).  Pt very lethargic when I examined . Appears comfortable      PAST MEDICAL & SURGICAL HISTORY:  Dementia  Glaucoma: bilateral on medication  Age Related Macular Degeneration:   Fracture Lumbar Vertebra-Closed: 2009  Osteoarthritis  Diabetes Mellitus Type II  Chronic Bronchitis with Emphysema:   Hyperlipidemia:   Congestive Heart Failure: ,   Myocardial Infarct, Old:   History of Tonsillectomy: child  Ovarian Cyst: excision ovarian cyst &#x27;s  History of Hysterectomy:   Colon Obstruction:  colon resection , benign      Allergies    PC Pen VK (Swelling)    Intolerances        FAMILY HISTORY:  No pertinent family history in first degree relatives      SOCIAL HISTORY:unknown    REVIEW OF SYSTEMS:    as per [facility]     MEDICATIONS  (STANDING):  dextrose 5%. 1000 milliLiter(s) (50 mL/Hr) IV Continuous <Continuous>  dextrose 50% Injectable 12.5 Gram(s) IV Push once  dextrose 50% Injectable 25 Gram(s) IV Push once  dextrose 50% Injectable 25 Gram(s) IV Push once  heparin  Injectable 5000 Unit(s) SubCutaneous every 12 hours  insulin lispro (HumaLOG) corrective regimen sliding scale   SubCutaneous three times a day before meals  levoFLOXacin IVPB 250 milliGRAM(s) IV Intermittent every 24 hours  mirtazapine 15 milliGRAM(s) Oral at bedtime  sodium chloride 0.9%. 1000 milliLiter(s) (100 mL/Hr) IV Continuous <Continuous>  vancomycin  IVPB 750 milliGRAM(s) IV Intermittent every 48 hours    MEDICATIONS  (PRN):  ALBUTerol/ipratropium for Nebulization 3 milliLiter(s) Nebulizer every 6 hours PRN Shortness of Breath and/or Wheezing  dextrose Gel 1 Dose(s) Oral once PRN Blood Glucose LESS THAN 70 milliGRAM(s)/deciliter  glucagon  Injectable 1 milliGRAM(s) IntraMuscular once PRN Glucose LESS THAN 70 milligrams/deciliter      Vital Signs Last 24 Hrs  Tmax :afebrile  HR: 100  (92 - 106)  BP: 143/68 (127/62 - 161/66)  RR: 16(16 - 18)  SpO2: 97% (95% - 97%)    PHYSICAL EXAM:    Constitutional: NAD,thin built  HEENT: PERRL  Neck: No LAD, No JVD  Back: Normal spine flexure, No CVA tenderness  Respiratory: CTAB/L  Cardiovascular: S1 and S2, RRR, no M/G/R  Gastrointestinal: BS+, soft, NT/ND  Extremities: No peripheral edema  Vascular: 2+ peripheral pulses  Neurological: too lethargic to participate  Skin: No rashes      LABS:                                 11.2   24-->28.10 )-----------( 250      ( 20 Mar 2018 07:53 )                      33.9     -    138  |  105  |  38<H>  ----------------------------<  243<H>  5.7<H>   |  24  |  1.94<H>    Ca    9.0      20 Mar 2018 07:53    TPro  7.6  /  Alb  3.0<L>  /  TBili  0.4  /  DBili  x   /  AST  70<H>  /  ALT  55  /  AlkPhos  204<H>      PT/INR - ( 19 Mar 2018 22:16 )   PT: 10.7 sec;   INR: 0.98 ratio         PTT - ( 19 Mar 2018 22:16 )  PTT:26.2 sec  Urinalysis Basic - ( 19 Mar 2018 22:16 )    Color: Yellow / Appearance: Slightly Turbid / S.010 / pH: x  Gluc: x / Ketone: Negative  / Bili: Negative / Urobili: Negative mg/dL   Blood: x / Protein: 30 mg/dL / Nitrite: Negative   Leuk Esterase: Moderate / RBC: 3-5 /HPF / WBC >50   Sq Epi: x / Non Sq Epi: Occasional / Bacteria: Many    < from: Xray Chest 1 View AP/PA. (18 @ 22:40) >  EXAM:  XR CHEST AP OR PA 1V                            PROCEDURE DATE:  2018          INTERPRETATION:  cough    A frontal chest film  demonstrates grossly clear lungs.  No acute   infiltrate or consolidation is  noted. The costophrenic anglesare   grossly clear.    The heart size and vascular markings are within normal limits for   technique.      No mediastinal or hilar adenopathy is suggested. .     The osseous structures appear intact intact.     IMPRESSION:  Clear  lungs.  No acute airspace disease suggested.    < end of copied text >      MICROBIOLOGY:  RECENT CULTURES:   .Blood Blood-Peripheral XXXX XXXX   No growth to date.          RADIOLOGY & ADDITIONAL STUDIES: Infectious Diseases - Attending at Dr. Marsh    HPI:  Pt is a 89 y/o female w/advanced dementia, Anxiety, lower back pain,cad, chf, LBBB,  glaucoma, DM2 HTN, CAD, OA and macular degeneration sent from NH for non bilious vomiting and diarrhea. Pt unable to contribute, earlier complained of ha now w/o complaints except need to urinate. (20 Mar 2018 04:10).  Pt very lethargic when I examined . Appears comfortable      PAST MEDICAL & SURGICAL HISTORY:  Dementia  Glaucoma: bilateral on medication  Age Related Macular Degeneration:   Fracture Lumbar Vertebra-Closed: 2009  Osteoarthritis  Diabetes Mellitus Type II  Chronic Bronchitis with Emphysema:   Hyperlipidemia:   Congestive Heart Failure: ,   Myocardial Infarct, Old:   History of Tonsillectomy: child  Ovarian Cyst: excision ovarian cyst &#x27;s  History of Hysterectomy:   Colon Obstruction:  colon resection , benign      Allergies    PC Pen VK (Swelling)    Intolerances        FAMILY HISTORY:  No pertinent family history in first degree relatives      SOCIAL HISTORY:unknown    REVIEW OF SYSTEMS:    as per [facility]     MEDICATIONS  (STANDING):  dextrose 5%. 1000 milliLiter(s) (50 mL/Hr) IV Continuous <Continuous>  dextrose 50% Injectable 12.5 Gram(s) IV Push once  dextrose 50% Injectable 25 Gram(s) IV Push once  dextrose 50% Injectable 25 Gram(s) IV Push once  heparin  Injectable 5000 Unit(s) SubCutaneous every 12 hours  insulin lispro (HumaLOG) corrective regimen sliding scale   SubCutaneous three times a day before meals  levoFLOXacin IVPB 250 milliGRAM(s) IV Intermittent every 24 hours  mirtazapine 15 milliGRAM(s) Oral at bedtime  sodium chloride 0.9%. 1000 milliLiter(s) (100 mL/Hr) IV Continuous <Continuous>  vancomycin  IVPB 750 milliGRAM(s) IV Intermittent every 48 hours    MEDICATIONS  (PRN):  ALBUTerol/ipratropium for Nebulization 3 milliLiter(s) Nebulizer every 6 hours PRN Shortness of Breath and/or Wheezing  dextrose Gel 1 Dose(s) Oral once PRN Blood Glucose LESS THAN 70 milliGRAM(s)/deciliter  glucagon  Injectable 1 milliGRAM(s) IntraMuscular once PRN Glucose LESS THAN 70 milligrams/deciliter      Vital Signs Last 24 Hrs  Tmax :afebrile  HR: 100  (92 - 106)  BP: 143/68 (127/62 - 161/66)  RR: 16(16 - 18)  SpO2: 97% (95% - 97%)    PHYSICAL EXAM:    Constitutional: NAD,thin built  HEENT: PERRL  Neck: No LAD, No JVD  Back: Normal spine flexure, No CVA tenderness  Respiratory: CTAB/L  Cardiovascular: S1 and S2, RRR, no M/G/R  Gastrointestinal: BS+, soft, NT/ND  Extremities: No peripheral edema  Vascular: 2+ peripheral pulses  Neurological: too lethargic to participate  Skin: No rashes      LABS:                                 11.2   24-->28.10 )-----------( 250      ( 20 Mar 2018 07:53 )                      33.9         138  |  105  |  38<H>  ----------------------------<  243<H>  5.7<H>   |  24  |  1.94<H>    Ca    9.0      20 Mar 2018 07:53    TPro  7.6  /  Alb  3.0<L>  /  TBili  0.4  /  DBili  x   /  AST  70<H>  /  ALT  55  /  AlkPhos  204<H>      PT/INR - ( 19 Mar 2018 22:16 )   PT: 10.7 sec;   INR: 0.98 ratio         PTT - ( 19 Mar 2018 22:16 )  PTT:26.2 sec  Urinalysis Basic - ( 19 Mar 2018 22:16 )    Color: Yellow / Appearance: Slightly Turbid / S.010 / pH: x  Gluc: x / Ketone: Negative  / Bili: Negative / Urobili: Negative mg/dL   Blood: x / Protein: 30 mg/dL / Nitrite: Negative   Leuk Esterase: Moderate / RBC: 3-5 /HPF / WBC >50   Sq Epi: x / Non Sq Epi: Occasional / Bacteria: Many    < from: Xray Chest 1 View AP/PA. (18 @ 22:40) >  EXAM:  XR CHEST AP OR PA 1V                            PROCEDURE DATE:  2018              MICROBIOLOGY:  RECENT CULTURES:   .Blood Blood-Peripheral XXXX XXXX   No growth to date.          RADIOLOGY & ADDITIONAL STUDIES:    INTERPRETATION:  cough    A frontal chest film  demonstrates grossly clear lungs.  No acute   infiltrate or consolidation is  noted. The costophrenic anglesare   grossly clear.    The heart size and vascular markings are within normal limits for   technique.      No mediastinal or hilar adenopathy is suggested. .     The osseous structures appear intact intact.     IMPRESSION:  Clear  lungs.  No acute airspace disease suggested.    < end of copied text >  < from: CT Abdomen and Pelvis No Cont (18 @ 00:26) >    IMPRESSION:    1. No bowel obstruction or inflammation. Moderate amount of stool in the   rectum.  2. Branching "tree in bud" opacities in the right middle and right lower   lobes likely infectious in etiology.     < end of copied text >

## 2018-03-20 NOTE — SWALLOW BEDSIDE ASSESSMENT ADULT - COMMENTS
XR CHEST 3/19/18 IMPRESSION: Clear lungs. No acute airspace disease suggested.  CT HEAD 3/19/18 IMPRESSION: No acute territorial infarct, hemorrhage or mass effect.

## 2018-03-20 NOTE — H&P ADULT - PROBLEM SELECTOR PLAN 1
admit to medicine  blood cultures  urine cx  vanco/levquin pcn allergic and unable to elucidate allergy  id consult  speech/swallow eval

## 2018-03-20 NOTE — H&P ADULT - NSHPLABSRESULTS_GEN_ALL_CORE
LABS:                        12.9   24.13 )-----------( 387      ( 19 Mar 2018 22:16 )             39.3     -    136  |  100  |  37<H>  ----------------------------<  242<H>  4.6   |  23  |  2.03<H>    Ca    9.5      19 Mar 2018 22:16    TPro  7.6  /  Alb  3.0<L>  /  TBili  0.4  /  DBili  x   /  AST  70<H>  /  ALT  55  /  AlkPhos  204<H>      PT/INR - ( 19 Mar 2018 22:16 )   PT: 10.7 sec;   INR: 0.98 ratio         PTT - ( 19 Mar 2018 22:16 )  PTT:26.2 sec  Urinalysis Basic - ( 19 Mar 2018 22:16 )    Color: Yellow / Appearance: Slightly Turbid / S.010 / pH: x  Gluc: x / Ketone: Negative  / Bili: Negative / Urobili: Negative mg/dL   Blood: x / Protein: 30 mg/dL / Nitrite: Negative   Leuk Esterase: Moderate / RBC: 3-5 /HPF / WBC >50   Sq Epi: x / Non Sq Epi: Occasional / Bacteria: Many      CAPILLARY BLOOD GLUCOSE          RADIOLOGY & ADDITIONAL TESTS:    Imaging Personally Reviewed:  [ X] YES  [ ] NO

## 2018-03-20 NOTE — CONSULT NOTE ADULT - PROBLEM SELECTOR RECOMMENDATION 9
with sepsis on admission   on levaquin and vanco   ? penicillin allergy with sepsis on admission   on levaquin and vanco   ? penicillin allergy  follow up on c/s

## 2018-03-20 NOTE — SWALLOW BEDSIDE ASSESSMENT ADULT - SWALLOW EVAL: RECOMMENDED FEEDING/EATING TECHNIQUES
small sips/bites/hard swallow w/ each bite or sip/check mouth frequently for oral residue/pocketing/crush medication (when feasible)

## 2018-03-20 NOTE — SWALLOW BEDSIDE ASSESSMENT ADULT - SLP GENERAL OBSERVATIONS
Patient seen bedside, lethargic and oriented to self. Patient inconsistently followed one step commands and answered yes/no questions verbally and with head shake/nods, needing maximal auditory and tactile cueing. Patient seen leaning on left side despite several attempts to reposition. Patient seen bedside, lethargic, and oriented to self. She needed max auditory/tactile stimuli/ Patient inconsistently followed one step commands and answered yes/no questions verbally and with head shake/nods. Patient seen leaning on left side despite several attempts to reposition.

## 2018-03-20 NOTE — CONSULT NOTE ADULT - SUBJECTIVE AND OBJECTIVE BOX
Information from chart:  "Patient is a 89y old  Female who presents with a chief complaint of n/v (20 Mar 2018 04:10)    HPI:  Pt is a 89 y/o female w/advanced dementia, Anxiety, lower back pain,cad, chf, LBBB,  glaucoma, DM2 HTN, CAD, OA and macular degeneration sent from NH for  for non bilious vomiting and diarrhea. Pt unable to contribute, earlier complained of ha now w/o complaints except need to urinate. (20 Mar 2018 04:10)   "  Patient appears comfortable, no distress.     PAST MEDICAL & SURGICAL HISTORY:  Dementia  Glaucoma: bilateral on medication  Age Related Macular Degeneration:   Fracture Lumbar Vertebra-Closed: 2009  Osteoarthritis  Diabetes Mellitus Type II  Chronic Bronchitis with Emphysema:   Hyperlipidemia:   Congestive Heart Failure: ,   Myocardial Infarct, Old:   History of Tonsillectomy: child  Ovarian Cyst: excision ovarian cyst &#x27;s  History of Hysterectomy:   Colon Obstruction:  colon resection , benign    FAMILY HISTORY:  No pertinent family history in first degree relatives    Allergies    PC Pen VK (Swelling)    Intolerances      Home Medications:  Aspir 81: chewable (19 Mar 2018 22:39)  Atarax 10 mg oral tablet: 10 milligram(s) orally 1 to 3 times a day (19 Mar 2018 22:39)  celecoxib 200 mg oral capsule: 200 milligram(s) orally once a day (19 Mar 2018 22:39)  Daily Anh oral tablet: 1 tab(s) orally once a day (19 Mar 2018 22:39)  enalapril 20 mg oral tablet: 1 tab(s) orally once a day (19 Mar 2018 22:39)  latanoprost 0.005% ophthalmic solution: 1 drop(s) to each affected eye once a day (in the evening)both  eyes (19 Mar 2018 22:39)  mirtazapine 15 mg oral tablet: 1 tab(s) orally once a day (at bedtime) (19 Mar 2018 22:39)  pantoprazole 40 mg oral delayed release tablet: 1 tab(s) orally once a day (19 Mar 2018 22:39)    MEDICATIONS  (STANDING):  dextrose 5%. 1000 milliLiter(s) (50 mL/Hr) IV Continuous <Continuous>  dextrose 50% Injectable 12.5 Gram(s) IV Push once  dextrose 50% Injectable 25 Gram(s) IV Push once  dextrose 50% Injectable 25 Gram(s) IV Push once  heparin  Injectable 5000 Unit(s) SubCutaneous every 12 hours  insulin lispro (HumaLOG) corrective regimen sliding scale   SubCutaneous three times a day before meals  levoFLOXacin IVPB 250 milliGRAM(s) IV Intermittent every 24 hours  mirtazapine 15 milliGRAM(s) Oral at bedtime  sodium chloride 0.9%. 1000 milliLiter(s) (100 mL/Hr) IV Continuous <Continuous>  vancomycin  IVPB 750 milliGRAM(s) IV Intermittent every 48 hours    MEDICATIONS  (PRN):  ALBUTerol/ipratropium for Nebulization 3 milliLiter(s) Nebulizer every 6 hours PRN Shortness of Breath and/or Wheezing  dextrose Gel 1 Dose(s) Oral once PRN Blood Glucose LESS THAN 70 milliGRAM(s)/deciliter  glucagon  Injectable 1 milliGRAM(s) IntraMuscular once PRN Glucose LESS THAN 70 milligrams/deciliter    Vital Signs Last 24 Hrs  T(C): 36.6 (20 Mar 2018 16:15), Max: 36.7 (20 Mar 2018 05:04)  T(F): 97.8 (20 Mar 2018 16:15), Max: 98 (20 Mar 2018 05:04)  HR: 92 (20 Mar 2018 16:15) (81 - 114)  BP: 141/63 (20 Mar 2018 16:15) (93/35 - 161/66)  BP(mean): --  RR: 18 (20 Mar 2018 16:15) (16 - 18)  SpO2: 95% (20 Mar 2018 15:30) (92% - 97%)    Daily Height in cm: 162.56 (19 Mar 2018 20:49)    Daily     CAPILLARY BLOOD GLUCOSE      POCT Blood Glucose.: 116 mg/dL (20 Mar 2018 12:01)    PHYSICAL EXAM:      T(C): 36.6 (18 @ 16:15), Max: 36.7 (18 @ 05:04)  HR: 92 (18 @ 16:15) (81 - 114)  BP: 141/63 (18 @ 16:15) (93/35 - 161/66)  RR: 18 (18 @ 16:15) (16 - 18)  SpO2: 95% (18 @ 15:30) (92% - 97%)  Wt(kg): --  Respiratory: clear anteriorly, decreased BS at bases  Cardiovascular: S1 S2  Gastrointestinal: soft NT ND +BS  Extremities:   no edema                  138  |  105  |  38<H>  ----------------------------<  243<H>  5.7<H>   |  24  |  1.94<H>    Ca    9.0      20 Mar 2018 07:53    TPro  7.6  /  Alb  3.0<L>  /  TBili  0.4  /  DBili  x   /  AST  70<H>  /  ALT  55  /  AlkPhos  204<H>                            11.2   28.10 )-----------( 250      ( 20 Mar 2018 07:53 )             33.9     Urinalysis Basic - ( 19 Mar 2018 22:16 )    Color: Yellow / Appearance: Slightly Turbid / S.010 / pH: x  Gluc: x / Ketone: Negative  / Bili: Negative / Urobili: Negative mg/dL   Blood: x / Protein: 30 mg/dL / Nitrite: Negative   Leuk Esterase: Moderate / RBC: 3-5 /HPF / WBC >50   Sq Epi: x / Non Sq Epi: Occasional / Bacteria: Many            Assessment and Plan    WADE pre renal azotemia suspected;   Gentle IVF; supportive care. Information from chart:  "Patient is a 89y old  Female who presents with a chief complaint of n/v (20 Mar 2018 04:10)    HPI:  Pt is a 89 y/o female w/advanced dementia, Anxiety, lower back pain,cad, chf, LBBB,  glaucoma, DM2 HTN, CAD, OA and macular degeneration sent from NH for  for non bilious vomiting and diarrhea. Pt unable to contribute, earlier complained of ha now w/o complaints except need to urinate. (20 Mar 2018 04:10)   "  Patient appears comfortable, no distress.     PAST MEDICAL & SURGICAL HISTORY:  Dementia  Glaucoma: bilateral on medication  Age Related Macular Degeneration:   Fracture Lumbar Vertebra-Closed: 2009  Osteoarthritis  Diabetes Mellitus Type II  Chronic Bronchitis with Emphysema:   Hyperlipidemia:   Congestive Heart Failure: ,   Myocardial Infarct, Old:   History of Tonsillectomy: child  Ovarian Cyst: excision ovarian cyst &#x27;s  History of Hysterectomy:   Colon Obstruction:  colon resection , benign    FAMILY HISTORY:  No pertinent family history in first degree relatives    Allergies    PC Pen VK (Swelling)    Intolerances      Home Medications:  Aspir 81: chewable (19 Mar 2018 22:39)  Atarax 10 mg oral tablet: 10 milligram(s) orally 1 to 3 times a day (19 Mar 2018 22:39)  celecoxib 200 mg oral capsule: 200 milligram(s) orally once a day (19 Mar 2018 22:39)  Daily Anh oral tablet: 1 tab(s) orally once a day (19 Mar 2018 22:39)  enalapril 20 mg oral tablet: 1 tab(s) orally once a day (19 Mar 2018 22:39)  latanoprost 0.005% ophthalmic solution: 1 drop(s) to each affected eye once a day (in the evening)both  eyes (19 Mar 2018 22:39)  mirtazapine 15 mg oral tablet: 1 tab(s) orally once a day (at bedtime) (19 Mar 2018 22:39)  pantoprazole 40 mg oral delayed release tablet: 1 tab(s) orally once a day (19 Mar 2018 22:39)    MEDICATIONS  (STANDING):  dextrose 5%. 1000 milliLiter(s) (50 mL/Hr) IV Continuous <Continuous>  dextrose 50% Injectable 12.5 Gram(s) IV Push once  dextrose 50% Injectable 25 Gram(s) IV Push once  dextrose 50% Injectable 25 Gram(s) IV Push once  heparin  Injectable 5000 Unit(s) SubCutaneous every 12 hours  insulin lispro (HumaLOG) corrective regimen sliding scale   SubCutaneous three times a day before meals  levoFLOXacin IVPB 250 milliGRAM(s) IV Intermittent every 24 hours  mirtazapine 15 milliGRAM(s) Oral at bedtime  sodium chloride 0.9%. 1000 milliLiter(s) (100 mL/Hr) IV Continuous <Continuous>  vancomycin  IVPB 750 milliGRAM(s) IV Intermittent every 48 hours    MEDICATIONS  (PRN):  ALBUTerol/ipratropium for Nebulization 3 milliLiter(s) Nebulizer every 6 hours PRN Shortness of Breath and/or Wheezing  dextrose Gel 1 Dose(s) Oral once PRN Blood Glucose LESS THAN 70 milliGRAM(s)/deciliter  glucagon  Injectable 1 milliGRAM(s) IntraMuscular once PRN Glucose LESS THAN 70 milligrams/deciliter    Vital Signs Last 24 Hrs  T(C): 36.6 (20 Mar 2018 16:15), Max: 36.7 (20 Mar 2018 05:04)  T(F): 97.8 (20 Mar 2018 16:15), Max: 98 (20 Mar 2018 05:04)  HR: 92 (20 Mar 2018 16:15) (81 - 114)  BP: 141/63 (20 Mar 2018 16:15) (93/35 - 161/66)  BP(mean): --  RR: 18 (20 Mar 2018 16:15) (16 - 18)  SpO2: 95% (20 Mar 2018 15:30) (92% - 97%)    Daily Height in cm: 162.56 (19 Mar 2018 20:49)    Daily     CAPILLARY BLOOD GLUCOSE      POCT Blood Glucose.: 116 mg/dL (20 Mar 2018 12:01)    PHYSICAL EXAM:      T(C): 36.6 (18 @ 16:15), Max: 36.7 (18 @ 05:04)  HR: 92 (18 @ 16:15) (81 - 114)  BP: 141/63 (18 @ 16:15) (93/35 - 161/66)  RR: 18 (18 @ 16:15) (16 - 18)  SpO2: 95% (18 @ 15:30) (92% - 97%)  Wt(kg): --  Respiratory: clear anteriorly, decreased BS at bases  Cardiovascular: S1 S2  Gastrointestinal: soft NT ND +BS  Extremities:   no edema                  138  |  105  |  38<H>  ----------------------------<  243<H>  5.7<H>   |  24  |  1.94<H>    Ca    9.0      20 Mar 2018 07:53    TPro  7.6  /  Alb  3.0<L>  /  TBili  0.4  /  DBili  x   /  AST  70<H>  /  ALT  55  /  AlkPhos  204<H>                            11.2   28.10 )-----------( 250      ( 20 Mar 2018 07:53 )             33.9     Urinalysis Basic - ( 19 Mar 2018 22:16 )    Color: Yellow / Appearance: Slightly Turbid / S.010 / pH: x  Gluc: x / Ketone: Negative  / Bili: Negative / Urobili: Negative mg/dL   Blood: x / Protein: 30 mg/dL / Nitrite: Negative   Leuk Esterase: Moderate / RBC: 3-5 /HPF / WBC >50   Sq Epi: x / Non Sq Epi: Occasional / Bacteria: Many            Assessment and Plan    WADE sepsis; pre renal azotemia suspected;   Gentle IVF; supportive care.  Medical rx hyperkalemia;   Will follow. Information from chart:  "Patient is a 89y old  Female who presents with a chief complaint of n/v (20 Mar 2018 04:10)    HPI:  Pt is a 89 y/o female w/advanced dementia, Anxiety, lower back pain,cad, chf, LBBB,  glaucoma, DM2 HTN, CAD, OA and macular degeneration sent from NH for  for non bilious vomiting and diarrhea. Pt unable to contribute, earlier complained of ha now w/o complaints except need to urinate. (20 Mar 2018 04:10)   "  Patient appears comfortable, no distress.     PAST MEDICAL & SURGICAL HISTORY:  Dementia  Glaucoma: bilateral on medication  Age Related Macular Degeneration:   Fracture Lumbar Vertebra-Closed: 2009  Osteoarthritis  Diabetes Mellitus Type II  Chronic Bronchitis with Emphysema:   Hyperlipidemia:   Congestive Heart Failure: ,   Myocardial Infarct, Old:   History of Tonsillectomy: child  Ovarian Cyst: excision ovarian cyst &#x27;s  History of Hysterectomy:   Colon Obstruction:  colon resection , benign    FAMILY HISTORY:  No pertinent family history in first degree relatives    Allergies    PC Pen VK (Swelling)    Intolerances      Home Medications:  Aspir 81: chewable (19 Mar 2018 22:39)  Atarax 10 mg oral tablet: 10 milligram(s) orally 1 to 3 times a day (19 Mar 2018 22:39)  celecoxib 200 mg oral capsule: 200 milligram(s) orally once a day (19 Mar 2018 22:39)  Daily Anh oral tablet: 1 tab(s) orally once a day (19 Mar 2018 22:39)  enalapril 20 mg oral tablet: 1 tab(s) orally once a day (19 Mar 2018 22:39)  latanoprost 0.005% ophthalmic solution: 1 drop(s) to each affected eye once a day (in the evening)both  eyes (19 Mar 2018 22:39)  mirtazapine 15 mg oral tablet: 1 tab(s) orally once a day (at bedtime) (19 Mar 2018 22:39)  pantoprazole 40 mg oral delayed release tablet: 1 tab(s) orally once a day (19 Mar 2018 22:39)    MEDICATIONS  (STANDING):  dextrose 5%. 1000 milliLiter(s) (50 mL/Hr) IV Continuous <Continuous>  dextrose 50% Injectable 12.5 Gram(s) IV Push once  dextrose 50% Injectable 25 Gram(s) IV Push once  dextrose 50% Injectable 25 Gram(s) IV Push once  heparin  Injectable 5000 Unit(s) SubCutaneous every 12 hours  insulin lispro (HumaLOG) corrective regimen sliding scale   SubCutaneous three times a day before meals  levoFLOXacin IVPB 250 milliGRAM(s) IV Intermittent every 24 hours  mirtazapine 15 milliGRAM(s) Oral at bedtime  sodium chloride 0.9%. 1000 milliLiter(s) (100 mL/Hr) IV Continuous <Continuous>  vancomycin  IVPB 750 milliGRAM(s) IV Intermittent every 48 hours    MEDICATIONS  (PRN):  ALBUTerol/ipratropium for Nebulization 3 milliLiter(s) Nebulizer every 6 hours PRN Shortness of Breath and/or Wheezing  dextrose Gel 1 Dose(s) Oral once PRN Blood Glucose LESS THAN 70 milliGRAM(s)/deciliter  glucagon  Injectable 1 milliGRAM(s) IntraMuscular once PRN Glucose LESS THAN 70 milligrams/deciliter    Vital Signs Last 24 Hrs  T(C): 36.6 (20 Mar 2018 16:15), Max: 36.7 (20 Mar 2018 05:04)  T(F): 97.8 (20 Mar 2018 16:15), Max: 98 (20 Mar 2018 05:04)  HR: 92 (20 Mar 2018 16:15) (81 - 114)  BP: 141/63 (20 Mar 2018 16:15) (93/35 - 161/66)  BP(mean): --  RR: 18 (20 Mar 2018 16:15) (16 - 18)  SpO2: 95% (20 Mar 2018 15:30) (92% - 97%)    Daily Height in cm: 162.56 (19 Mar 2018 20:49)    Daily     CAPILLARY BLOOD GLUCOSE      POCT Blood Glucose.: 116 mg/dL (20 Mar 2018 12:01)    PHYSICAL EXAM:      T(C): 36.6 (18 @ 16:15), Max: 36.7 (18 @ 05:04)  HR: 92 (18 @ 16:15) (81 - 114)  BP: 141/63 (18 @ 16:15) (93/35 - 161/66)  RR: 18 (18 @ 16:15) (16 - 18)  SpO2: 95% (18 @ 15:30) (92% - 97%)  Wt(kg): --  Respiratory: clear anteriorly, decreased BS at bases  Cardiovascular: S1 S2  Gastrointestinal: soft NT ND +BS  Extremities:   no edema                  138  |  105  |  38<H>  ----------------------------<  243<H>  5.7<H>   |  24  |  1.94<H>    Ca    9.0      20 Mar 2018 07:53    TPro  7.6  /  Alb  3.0<L>  /  TBili  0.4  /  DBili  x   /  AST  70<H>  /  ALT  55  /  AlkPhos  204<H>                            11.2   28.10 )-----------( 250      ( 20 Mar 2018 07:53 )             33.9     Urinalysis Basic - ( 19 Mar 2018 22:16 )    Color: Yellow / Appearance: Slightly Turbid / S.010 / pH: x  Gluc: x / Ketone: Negative  / Bili: Negative / Urobili: Negative mg/dL   Blood: x / Protein: 30 mg/dL / Nitrite: Negative   Leuk Esterase: Moderate / RBC: 3-5 /HPF / WBC >50   Sq Epi: x / Non Sq Epi: Occasional / Bacteria: Many            Assessment and Plan    WADE sepsis; pre renal azotemia suspected; r/o infectious AIN.  Gentle IVF; supportive care.  Medical rx hyperkalemia;   Will follow.

## 2018-03-20 NOTE — H&P ADULT - NSHPPHYSICALEXAM_GEN_ALL_CORE
Vital Signs Last 24 Hrs  T(C): 36.7 (20 Mar 2018 05:04), Max: 36.7 (20 Mar 2018 05:04)  T(F): 98 (20 Mar 2018 05:04), Max: 98 (20 Mar 2018 05:04)  HR: 84 (20 Mar 2018 05:04) (84 - 114)  BP: 110/48 (20 Mar 2018 05:04) (93/35 - 110/48)  BP(mean): --  RR: 18 (20 Mar 2018 05:04) (18 - 18)  SpO2: 96% (20 Mar 2018 05:04) (92% - 96%)    PHYSICAL EXAM:    GENERAL: thin female, talking to herself  HEAD:  Atraumatic, Normocephalic  EYES: EOMI, PERRLA, conjunctiva and sclera clear  ENMT: No tonsillar erythema, exudates, or enlargement; dry mucous membranes, No lesions  NECK: Supple, No JVD, Normal thyroid  NERVOUS SYSTEM:  Alert & Oriented X1, Motor Strength 5/5 B/L upper and lower extremities;  CHEST/LUNG: left clr, r-rales  HEART: Regular rate and rhythm; No rubs, or gallops, +S1,S2  ABDOMEN: Soft, Nontender, Nondistended; Bowel sounds present, old surgical scar, reducible ventral hernia  EXTREMITIES:  2+ Peripheral Pulses, No clubbing, cyanosis, or edema  LYMPH: No cervical adenopathy  RECTAL: deferred  BREAST: deferred  : deferred  SKIN: No rashes or lesions    IMPROVE VTE Individual Risk Assessment          RISK                                                          Points  [  ] Previous VTE                                                3  [  ] Thrombophilia                                             2  [  ] Lower limb paralysis                                    2        (unable to hold up >15 seconds)    [  ] Current Cancer                                             2         (within 6 months)  [ x ] Immobilization > 24 hrs                              1  [  ] ICU/CCU stay > 24 hours                            1  [  x] Age > 60                                                    1  IMPROVE VTE Score ___2______

## 2018-03-20 NOTE — SWALLOW BEDSIDE ASSESSMENT ADULT - SWALLOW EVAL: DIAGNOSIS
Patient presented with oropharyngeal dysphagia marked by anterior and lateral loss of bolus on the right side, slow oral transport posterior, and suspect delay of the pharyngeal swallow with good laryngeal elevation. No clinical signs of aspiration across consistencies trialed.

## 2018-03-20 NOTE — SWALLOW BEDSIDE ASSESSMENT ADULT - ORAL PREPARATORY PHASE
Lateral loss of bolus/Reduced oral grading/Anterior loss of bolus/right side Lateral loss of bolus/Reduced oral grading/right side/Anterior loss of bolus Refuses to accept bolus into oral cavity

## 2018-03-20 NOTE — H&P ADULT - HISTORY OF PRESENT ILLNESS
Pt is a 89 y/o female w/advanced dementia, Anxiety, lower back pain,cad, chf, LBBB,  glaucoma, DM2 HTN, CAD, OA and macular degeneration sent from NH for  for non bilious vomiting and diarrhea. Pt unable to contribute, earlier complained of ha now w/o complaints except need to urinate.

## 2018-03-20 NOTE — CONSULT NOTE ADULT - PROBLEM SELECTOR RECOMMENDATION 2
sec to above  ? pneumonia (based on CT abdomen "" tree in bud appearance " though pt comfortable ,no tachypnea, cough etc sec to above  ? pneumonia (based on CT abdomen "" tree in bud appearance " though pt comfortable ,no tachypnea, cough etc  diarrhea on presentation Cdiff neg  CT abdo :no intra abdo pathology

## 2018-03-20 NOTE — CHART NOTE - NSCHARTNOTEFT_GEN_A_CORE
Pt with advanced dementia sent from snf for vomiting/diarrhea.    Pt with Pna, LA, leukocytosis, osvaldo/hyperkalemia.    nephrology eval/ kayexalate   SLP eval noted, Dysphagia 1 puree consistency with honey thick liquids  check mouth frequently for oral residue/pocketing; crush medication (when feasible); hard swallow w/ each bite or sip; small sips/bites  ID eval, Abx    will be followed by hospitalist team.

## 2018-03-20 NOTE — PATIENT PROFILE ADULT. - AS SC BRADEN ACTIVITY
Rosenda Olivo [facility], [place]  1945  [id number]    Situation:  Verbal report received from: Martin Heller RN  Procedure: Procedure(s):  ESOPHAGOGASTRODUODENOSCOPY (EGD)    Background:    Preoperative diagnosis: ESOPHAGEAL DYSPHAGIA  Postoperative diagnosis: e    :  Dr. Rhona Theodore  Assistant(s): Endoscopy Technician-1: Janeth Martinez  Endoscopy RN-1: Geovanni Moss RN    Specimens: * No specimens in log *  H. Pylori  yes    Assessment:  Intra-procedure medications     Anesthesia gave intra-procedure sedation and medications, see anesthesia flow sheet yes    Intravenous fluids: NS@ KVO     Vital signs stable     Abdominal assessment: round and soft   No crepitus felt around collarbones    Recommendation:  Discharge patient per MD order  Family or Friend   Permission to share finding with family or friend yes    Endoscopy discharge instructions have been reviewed and given to patient and spouse. The patient and spouse verbalized understanding and acceptance of instructions.
(1) bedfast

## 2018-03-21 DIAGNOSIS — N30.00 ACUTE CYSTITIS WITHOUT HEMATURIA: ICD-10-CM

## 2018-03-21 DIAGNOSIS — D72.829 ELEVATED WHITE BLOOD CELL COUNT, UNSPECIFIED: ICD-10-CM

## 2018-03-21 DIAGNOSIS — G93.41 METABOLIC ENCEPHALOPATHY: ICD-10-CM

## 2018-03-21 LAB
CULTURE RESULTS: NO GROWTH — SIGNIFICANT CHANGE UP
GLUCOSE BLDC GLUCOMTR-MCNC: 147 MG/DL — HIGH (ref 70–99)
MANUAL SMEAR VERIFICATION: SIGNIFICANT CHANGE UP
PLAT MORPH BLD: SIGNIFICANT CHANGE UP
RBC BLD AUTO: SIGNIFICANT CHANGE UP
SPECIMEN SOURCE: SIGNIFICANT CHANGE UP

## 2018-03-21 PROCEDURE — 99223 1ST HOSP IP/OBS HIGH 75: CPT

## 2018-03-21 PROCEDURE — 99233 SBSQ HOSP IP/OBS HIGH 50: CPT

## 2018-03-21 RX ADMIN — SODIUM CHLORIDE 100 MILLILITER(S): 9 INJECTION INTRAMUSCULAR; INTRAVENOUS; SUBCUTANEOUS at 00:53

## 2018-03-21 RX ADMIN — HEPARIN SODIUM 5000 UNIT(S): 5000 INJECTION INTRAVENOUS; SUBCUTANEOUS at 05:04

## 2018-03-21 RX ADMIN — HEPARIN SODIUM 5000 UNIT(S): 5000 INJECTION INTRAVENOUS; SUBCUTANEOUS at 17:34

## 2018-03-21 RX ADMIN — MIRTAZAPINE 15 MILLIGRAM(S): 45 TABLET, ORALLY DISINTEGRATING ORAL at 22:03

## 2018-03-21 NOTE — DIETITIAN INITIAL EVALUATION ADULT. - NS AS NUTRI INTERV MEALS SNACK
Recommend Dysphagia 1 Pureed- Honey Consistency Fluids , Low sodium, consistent carbohydrate c evening snack/Carbohydrate - modified diet/Mineral - modified diet/Texture-modified diet

## 2018-03-21 NOTE — DIETITIAN INITIAL EVALUATION ADULT. - ENERGY NEEDS
Height (cm): 162.56 (03-19)  Weight (kg): 47.8 (03-20)  BMI (kg/m2): 18.1 (03-20)  IBW: 54.4 kg        % IBW: 87%          UBW:  ?        %UBW: ?.  I/O: 1320/0(+1320)

## 2018-03-21 NOTE — PROGRESS NOTE ADULT - SUBJECTIVE AND OBJECTIVE BOX
Patient is a 89y old  Female who presents with a chief complaint of n/v (20 Mar 2018 04:10)      INTERVAL HPI/OVERNIGHT EVENTS: patient encountered sleeping     MEDICATIONS  (STANDING):  dextrose 5%. 1000 milliLiter(s) (50 mL/Hr) IV Continuous <Continuous>  dextrose 50% Injectable 12.5 Gram(s) IV Push once  dextrose 50% Injectable 25 Gram(s) IV Push once  dextrose 50% Injectable 25 Gram(s) IV Push once  heparin  Injectable 5000 Unit(s) SubCutaneous every 12 hours  insulin lispro (HumaLOG) corrective regimen sliding scale   SubCutaneous three times a day before meals  levoFLOXacin IVPB 250 milliGRAM(s) IV Intermittent every 24 hours  mirtazapine 15 milliGRAM(s) Oral at bedtime  sodium chloride 0.9%. 1000 milliLiter(s) (100 mL/Hr) IV Continuous <Continuous>  vancomycin  IVPB 750 milliGRAM(s) IV Intermittent every 48 hours    MEDICATIONS  (PRN):  ALBUTerol/ipratropium for Nebulization 3 milliLiter(s) Nebulizer every 6 hours PRN Shortness of Breath and/or Wheezing  dextrose Gel 1 Dose(s) Oral once PRN Blood Glucose LESS THAN 70 milliGRAM(s)/deciliter  glucagon  Injectable 1 milliGRAM(s) IntraMuscular once PRN Glucose LESS THAN 70 milligrams/deciliter      Allergies    PC Pen VK (Swelling)    Intolerances        REVIEW OF SYSTEMS:  unable to perform as patient asleep   Vital Signs Last 24 Hrs  T(C): 37.1 (21 Mar 2018 17:17), Max: 37.1 (21 Mar 2018 11:30)  T(F): 98.8 (21 Mar 2018 17:17), Max: 98.8 (21 Mar 2018 17:17)  HR: 115 (21 Mar 2018 17:17) (93 - 115)  BP: 176/89 (21 Mar 2018 17:17) (143/68 - 179/80)  BP(mean): --  RR: 17 (21 Mar 2018 17:17) (16 - 18)  SpO2: 94% (21 Mar 2018 17:17) (94% - 97%)    PHYSICAL EXAM:  GENERAL: NAD frail  apearing   HEAD:  Atraumatic, Normocephalic  EYES: EOMI, PERRLA, conjunctiva and sclera clear  ENMT: No tonsillar erythema, exudates, or enlargement; Moist mucous membranes, Good dentition, No lesions  NECK: Supple, No JVD, Normal thyroid  NERVOUS SYSTEM:  Alert & Oriented X3, Good concentration; Motor Strength 5/5 B/L upper and lower extremities; DTRs 2+ intact and symmetric  CHEST/LUNG: crackles b/l HEART: Regular rate and rhythm; No murmurs, rubs, or gallops  ABDOMEN: Soft, Nontender, Nondistended; Bowel sounds present  EXTREMITIES:  2+ Peripheral Pulses, No clubbing, cyanosis, or edema  LYMPH: No lymphadenopathy noted  SKIN: No rashes or lesions    LABS:                        11.2   28.10 )-----------( 250      ( 20 Mar 2018 07:53 )             33.9     03-20    138  |  105  |  38<H>  ----------------------------<  243<H>  5.7<H>   |  24  |  1.94<H>    Ca    9.0      20 Mar 2018 07:53    TPro  7.6  /  Alb  3.0<L>  /  TBili  0.4  /  DBili  x   /  AST  70<H>  /  ALT  55  /  AlkPhos  204<H>  03-19    PT/INR - ( 19 Mar 2018 22:16 )   PT: 10.7 sec;   INR: 0.98 ratio         PTT - ( 19 Mar 2018 22:16 )  PTT:26.2 sec  Urinalysis Basic - ( 19 Mar 2018 22:16 )    Color: Yellow / Appearance: Slightly Turbid / S.010 / pH: x  Gluc: x / Ketone: Negative  / Bili: Negative / Urobili: Negative mg/dL   Blood: x / Protein: 30 mg/dL / Nitrite: Negative   Leuk Esterase: Moderate / RBC: 3-5 /HPF / WBC >50   Sq Epi: x / Non Sq Epi: Occasional / Bacteria: Many      CAPILLARY BLOOD GLUCOSE      POCT Blood Glucose.: 114 mg/dL (21 Mar 2018 16:43)  POCT Blood Glucose.: 91 mg/dL (21 Mar 2018 11:35)  POCT Blood Glucose.: 74 mg/dL (21 Mar 2018 07:49)  POCT Blood Glucose.: 67 mg/dL (21 Mar 2018 07:44)  POCT Blood Glucose.: 154 mg/dL (20 Mar 2018 21:14)      RADIOLOGY & ADDITIONAL TESTS:    Imaging Personally Reviewed:  [ ] YES  [ ] NO    Consultant(s) Notes Reviewed:  [ ] YES  [ ] NO    Care Discussed with Consultants/Other Providers [ ] YES  [ ] NO

## 2018-03-21 NOTE — DIETITIAN INITIAL EVALUATION ADULT. - FACTORS AFF FOOD INTAKE
difficulty chewing/difficulty swallowing/difficulty feeding self/change in mental status/other (specify)/pt is mostly edentulous c few teeth in mouth, 03-20, swallow evaluation recommended Dysphagia 1 Pureed- Honey Consistency Fluids

## 2018-03-21 NOTE — DIETITIAN INITIAL EVALUATION ADULT. - PHYSICAL APPEARANCE
contracted/underweight/debilitated/BMI= 18.1(03-20), Nutrition focused physical exam conducted; Subcutaneous fat Exam;  [mild-moderate  ]  Orbital fat pads region,  [ unable  ]Buccal fat region,  [ unable ]triceps region, [ unable ]ribs region.  Muscle Exam; [ moderate ]temples region, [  moderate]clavicle region, [ moderate->severe ]shoulder region, [moderate  ]Scapula region, [moderate  ]Interosseous region, [severe  ]thigh region, [moderate   ]Calf region

## 2018-03-21 NOTE — DIETITIAN INITIAL EVALUATION ADULT. - OTHER INFO
Pt seen due to MD consult for protein calorie malnutrition.  Nursing Assistant stated that pt had poor oral intake c <25% intake & c/o not liking the food.

## 2018-03-21 NOTE — CHART NOTE - NSCHARTNOTEFT_GEN_A_CORE
Upon Nutritional Assessment by the Registered Dietitian your patient was determined to meet criteria / has evidence of the following diagnosis/diagnoses:          [ ]  Mild Protein Calorie Malnutrition        [x ]  Moderate Protein Calorie Malnutrition        [ ] Severe Protein Calorie Malnutrition        [ ] Unspecified Protein Calorie Malnutrition        [ ] Underweight / BMI <19        [ ] Morbid Obesity / BMI > 40      Findings as based on:  •  Comprehensive nutrition assessment and consultation  •  Calorie counts (nutrient intake analysis)  •  Food acceptance and intake status from observations by staff  •  Follow up  •  Patient education  •  Intervention secondary to interdisciplinary rounds  •   concerns      Treatment:    The following diet has been recommended:  Dysphagia 1 Pureed- Honey Consistency Fluids , Low sodium , consistent carbohydrate c evening snack , Ensure Pudding 4 oz 3x/day (510 barry, 12 gm pro)   If consistent c planned medical therapy, consider swallow evaluation    PROVIDER Section:     By signing this assessment you are acknowledging and agree with the diagnosis/diagnoses assigned by the Registered Dietitian    Comments: Upon Nutritional Assessment by the Registered Dietitian your patient was determined to meet criteria / has evidence of the following diagnosis/diagnoses:          [ ]  Mild Protein Calorie Malnutrition        [x ]  Moderate Protein Calorie Malnutrition        [ ] Severe Protein Calorie Malnutrition        [ ] Unspecified Protein Calorie Malnutrition        [ x] Underweight / BMI <19        [ ] Morbid Obesity / BMI > 40      Findings as based on:  •  Comprehensive nutrition assessment and consultation  •  Calorie counts (nutrient intake analysis)  •  Food acceptance and intake status from observations by staff  •  Follow up  •  Patient education  •  Intervention secondary to interdisciplinary rounds  •   concerns      Treatment:    The following diet has been recommended:  Dysphagia 1 Pureed- Honey Consistency Fluids , Low sodium , consistent carbohydrate c evening snack , Ensure Pudding 4 oz 3x/day (510 barry, 12 gm pro)   If consistent c planned medical therapy, consider swallow evaluation    PROVIDER Section:     By signing this assessment you are acknowledging and agree with the diagnosis/diagnoses assigned by the Registered Dietitian    Comments:

## 2018-03-21 NOTE — DIETITIAN INITIAL EVALUATION ADULT. - DIET TYPE
dysphagia 1, pureed, honey consistency fluid/consistent carbohydrate (evening snack)/03-20/DASH/TLC (sodium and cholesterol restricted diet)

## 2018-03-21 NOTE — DIETITIAN INITIAL EVALUATION ADULT. - PERTINENT LABORATORY DATA
03-20 Na138 mmol/L Glu 243 mg/dL<H> K+ 5.7 mmol/L<H> Cr  1.94 mg/dL<H> BUN 38 mg/dL<H> GFR  22 mL/min/1.73M2 <L>03-19 Alb 3.0 g/dL<L>03-19 ALT 55 U/L AST 70 U/L<H> Alkaline Phosphatase 204 U/L<H>

## 2018-03-21 NOTE — PROGRESS NOTE ADULT - SUBJECTIVE AND OBJECTIVE BOX
Patient is a 89y old  Female who presents with a chief complaint of n/v (20 Mar 2018 04:10)      INTERVAL HPI / OVERNIGHT EVENTS:    MEDICATIONS  (STANDING):  dextrose 5%. 1000 milliLiter(s) (50 mL/Hr) IV Continuous <Continuous>  dextrose 50% Injectable 12.5 Gram(s) IV Push once  dextrose 50% Injectable 25 Gram(s) IV Push once  dextrose 50% Injectable 25 Gram(s) IV Push once  heparin  Injectable 5000 Unit(s) SubCutaneous every 12 hours  insulin lispro (HumaLOG) corrective regimen sliding scale   SubCutaneous three times a day before meals  levoFLOXacin IVPB 250 milliGRAM(s) IV Intermittent every 24 hours  mirtazapine 15 milliGRAM(s) Oral at bedtime  sodium chloride 0.9%. 1000 milliLiter(s) (100 mL/Hr) IV Continuous <Continuous>  vancomycin  IVPB 750 milliGRAM(s) IV Intermittent every 48 hours    MEDICATIONS  (PRN):  ALBUTerol/ipratropium for Nebulization 3 milliLiter(s) Nebulizer every 6 hours PRN Shortness of Breath and/or Wheezing  dextrose Gel 1 Dose(s) Oral once PRN Blood Glucose LESS THAN 70 milliGRAM(s)/deciliter  glucagon  Injectable 1 milliGRAM(s) IntraMuscular once PRN Glucose LESS THAN 70 milligrams/deciliter      Vital Signs Last 24 Hrs  T(C): 37.1 (21 Mar 2018 17:17), Max: 37.1 (21 Mar 2018 11:30)  T(F): 98.8 (21 Mar 2018 17:17), Max: 98.8 (21 Mar 2018 17:17)  HR: 115 (21 Mar 2018 17:17) (93 - 115)  BP: 176/89 (21 Mar 2018 17:17) (143/68 - 179/80)  BP(mean): --  RR: 17 (21 Mar 2018 17:17) (16 - 18)  SpO2: 94% (21 Mar 2018 17:17) (94% - 97%)    PHYSICAL EXAM:  General :NAD  Constitutional:  well-groomed, well-developed  Respiratory: CTAB/L  Cardiovascular: S1 and S2, RRR, no M/G/R  Gastrointestinal: BS+, soft, NT/ND  Extremities: No peripheral edema  Vascular: 2+ peripheral pulses  Skin: No rashes      LABS:                        11.2   28.10 )-----------( 250      ( 20 Mar 2018 07:53 )             33.9     03-20    138  |  105  |  38<H>  ----------------------------<  243<H>  5.7<H>   |  24  |  1.94<H>    Ca    9.0      20 Mar 2018 07:53            MICROBIOLOGY:  RECENT CULTURES:  03-20 .Urine Clean Catch (Midstream) XXXX XXXX   No growth    03-20 .Blood Blood-Peripheral XXXX XXXX   No growth to date.          RADIOLOGY & ADDITIONAL STUDIES: Patient is a 89y old  Female who presents with a chief complaint of n/v (20 Mar 2018 04:10)      INTERVAL HPI / OVERNIGHT EVENTS:no new events    MEDICATIONS  (STANDING):  dextrose 5%. 1000 milliLiter(s) (50 mL/Hr) IV Continuous <Continuous>  dextrose 50% Injectable 12.5 Gram(s) IV Push once  dextrose 50% Injectable 25 Gram(s) IV Push once  dextrose 50% Injectable 25 Gram(s) IV Push once  heparin  Injectable 5000 Unit(s) SubCutaneous every 12 hours  insulin lispro (HumaLOG) corrective regimen sliding scale   SubCutaneous three times a day before meals  levoFLOXacin IVPB 250 milliGRAM(s) IV Intermittent every 24 hours  mirtazapine 15 milliGRAM(s) Oral at bedtime  sodium chloride 0.9%. 1000 milliLiter(s) (100 mL/Hr) IV Continuous <Continuous>  vancomycin  IVPB 750 milliGRAM(s) IV Intermittent every 48 hours    MEDICATIONS  (PRN):  ALBUTerol/ipratropium for Nebulization 3 milliLiter(s) Nebulizer every 6 hours PRN Shortness of Breath and/or Wheezing  dextrose Gel 1 Dose(s) Oral once PRN Blood Glucose LESS THAN 70 milliGRAM(s)/deciliter  glucagon  Injectable 1 milliGRAM(s) IntraMuscular once PRN Glucose LESS THAN 70 milligrams/deciliter      Vital Signs Last 24 Hrs  T(C): 37.1 (21 Mar 2018 17:17), Max: 37.1 (21 Mar 2018 11:30)  T(F): 98.8 (21 Mar 2018 17:17), Max: 98.8 (21 Mar 2018 17:17)  HR: 115 (21 Mar 2018 17:17) (93 - 115)  BP: 176/89 (21 Mar 2018 17:17) (143/68 - 179/80)  BP(mean): --  RR: 17 (21 Mar 2018 17:17) (16 - 18)  SpO2: 94% (21 Mar 2018 17:17) (94% - 97%)    PHYSICAL EXAM:  General :NAD  Constitutional:  well-groomed, well-developed  Respiratory: CTAB/L  Cardiovascular: S1 and S2, RRR, no M/G/R  Gastrointestinal: BS+, soft, NT/ND  Extremities: No peripheral edema  Vascular: 2+ peripheral pulses  Skin: No rashes      LABS:                        11.2   28.10 )-----------( 250      ( 20 Mar 2018 07:53 )             33.9     03-20    138  |  105  |  38<H>  ----------------------------<  243<H>  5.7<H>   |  24  |  1.94<H>    Ca    9.0      20 Mar 2018 07:53            MICROBIOLOGY:  RECENT CULTURES:  03-20 .Urine Clean Catch (Midstream) XXXX XXXX   No growth    03-20 .Blood Blood-Peripheral XXXX XXXX   No growth to date.          RADIOLOGY & ADDITIONAL STUDIES:

## 2018-03-21 NOTE — PROGRESS NOTE ADULT - SUBJECTIVE AND OBJECTIVE BOX
Patient feels well no new complaints today. More alert.    MEDICATIONS  (STANDING):  dextrose 5%. 1000 milliLiter(s) (50 mL/Hr) IV Continuous <Continuous>  dextrose 50% Injectable 12.5 Gram(s) IV Push once  dextrose 50% Injectable 25 Gram(s) IV Push once  dextrose 50% Injectable 25 Gram(s) IV Push once  heparin  Injectable 5000 Unit(s) SubCutaneous every 12 hours  insulin lispro (HumaLOG) corrective regimen sliding scale   SubCutaneous three times a day before meals  levoFLOXacin IVPB 250 milliGRAM(s) IV Intermittent every 24 hours  mirtazapine 15 milliGRAM(s) Oral at bedtime  sodium chloride 0.9%. 1000 milliLiter(s) (100 mL/Hr) IV Continuous <Continuous>  vancomycin  IVPB 750 milliGRAM(s) IV Intermittent every 48 hours    MEDICATIONS  (PRN):  ALBUTerol/ipratropium for Nebulization 3 milliLiter(s) Nebulizer every 6 hours PRN Shortness of Breath and/or Wheezing  dextrose Gel 1 Dose(s) Oral once PRN Blood Glucose LESS THAN 70 milliGRAM(s)/deciliter  glucagon  Injectable 1 milliGRAM(s) IntraMuscular once PRN Glucose LESS THAN 70 milligrams/deciliter      03-20-18 @ 07:01  -  03-21-18 @ 07:00  --------------------------------------------------------  IN: 1320 mL / OUT: 0 mL / NET: 1320 mL      PHYSICAL EXAM:      T(C): 37.1 (03-21-18 @ 11:30), Max: 37.1 (03-21-18 @ 11:30)  HR: 114 (03-21-18 @ 11:30) (92 - 114)  BP: 179/80 (03-21-18 @ 11:30) (127/62 - 179/80)  RR: 18 (03-21-18 @ 11:30) (16 - 18)  SpO2: 95% (03-21-18 @ 11:30) (95% - 97%)  Wt(kg): --  Respiratory: clear anteriorly, decreased BS at bases  Cardiovascular: S1 S2  Gastrointestinal: soft NT ND +BS  Extremities:   tr edema                                    11.2   28.10 )-----------( 250      ( 20 Mar 2018 07:53 )             33.9     03-20    138  |  105  |  38<H>  ----------------------------<  243<H>  5.7<H>   |  24  |  1.94<H>    Ca    9.0      20 Mar 2018 07:53    TPro  7.6  /  Alb  3.0<L>  /  TBili  0.4  /  DBili  x   /  AST  70<H>  /  ALT  55  /  AlkPhos  204<H>  03-19      LIVER FUNCTIONS - ( 19 Mar 2018 22:16 )  Alb: 3.0 g/dL / Pro: 7.6 gm/dL / ALK PHOS: 204 U/L / ALT: 55 U/L / AST: 70 U/L / GGT: x             Assessment and Plan:  WADE sepsis; pre renal azotemia suspected; r/o infectious AIN.  Continue IVF; follow lab trend;

## 2018-03-22 LAB
ANION GAP SERPL CALC-SCNC: 7 MMOL/L — SIGNIFICANT CHANGE UP (ref 5–17)
ANION GAP SERPL CALC-SCNC: 8 MMOL/L — SIGNIFICANT CHANGE UP (ref 5–17)
BUN SERPL-MCNC: 19 MG/DL — SIGNIFICANT CHANGE UP (ref 7–23)
BUN SERPL-MCNC: 21 MG/DL — SIGNIFICANT CHANGE UP (ref 7–23)
CALCIUM SERPL-MCNC: 8.8 MG/DL — SIGNIFICANT CHANGE UP (ref 8.5–10.1)
CALCIUM SERPL-MCNC: 8.9 MG/DL — SIGNIFICANT CHANGE UP (ref 8.5–10.1)
CHLORIDE SERPL-SCNC: 107 MMOL/L — SIGNIFICANT CHANGE UP (ref 96–108)
CHLORIDE SERPL-SCNC: 108 MMOL/L — SIGNIFICANT CHANGE UP (ref 96–108)
CO2 SERPL-SCNC: 23 MMOL/L — SIGNIFICANT CHANGE UP (ref 22–31)
CO2 SERPL-SCNC: 24 MMOL/L — SIGNIFICANT CHANGE UP (ref 22–31)
CREAT SERPL-MCNC: 0.84 MG/DL — SIGNIFICANT CHANGE UP (ref 0.5–1.3)
CREAT SERPL-MCNC: 0.85 MG/DL — SIGNIFICANT CHANGE UP (ref 0.5–1.3)
GLUCOSE BLDC GLUCOMTR-MCNC: 112 MG/DL — HIGH (ref 70–99)
GLUCOSE BLDC GLUCOMTR-MCNC: 118 MG/DL — HIGH (ref 70–99)
GLUCOSE BLDC GLUCOMTR-MCNC: 125 MG/DL — HIGH (ref 70–99)
GLUCOSE BLDC GLUCOMTR-MCNC: 130 MG/DL — HIGH (ref 70–99)
GLUCOSE SERPL-MCNC: 106 MG/DL — HIGH (ref 70–99)
GLUCOSE SERPL-MCNC: 135 MG/DL — HIGH (ref 70–99)
HBA1C BLD-MCNC: 5.8 % — HIGH (ref 4–5.6)
HCT VFR BLD CALC: 32.8 % — LOW (ref 34.5–45)
HCT VFR BLD CALC: 33.7 % — LOW (ref 34.5–45)
HGB BLD-MCNC: 11.1 G/DL — LOW (ref 11.5–15.5)
HGB BLD-MCNC: 11.4 G/DL — LOW (ref 11.5–15.5)
LACTATE SERPL-SCNC: 0.8 MMOL/L — SIGNIFICANT CHANGE UP (ref 0.7–2)
LACTATE SERPL-SCNC: 1 MMOL/L — SIGNIFICANT CHANGE UP (ref 0.7–2)
MAGNESIUM SERPL-MCNC: 1.9 MG/DL — SIGNIFICANT CHANGE UP (ref 1.6–2.6)
MAGNESIUM SERPL-MCNC: 2 MG/DL — SIGNIFICANT CHANGE UP (ref 1.6–2.6)
MCHC RBC-ENTMCNC: 30.3 PG — SIGNIFICANT CHANGE UP (ref 27–34)
MCHC RBC-ENTMCNC: 30.4 PG — SIGNIFICANT CHANGE UP (ref 27–34)
MCHC RBC-ENTMCNC: 33.8 GM/DL — SIGNIFICANT CHANGE UP (ref 32–36)
MCHC RBC-ENTMCNC: 33.8 GM/DL — SIGNIFICANT CHANGE UP (ref 32–36)
MCV RBC AUTO: 89.6 FL — SIGNIFICANT CHANGE UP (ref 80–100)
MCV RBC AUTO: 89.9 FL — SIGNIFICANT CHANGE UP (ref 80–100)
NRBC # BLD: 0 /100 WBCS — SIGNIFICANT CHANGE UP (ref 0–0)
NRBC # BLD: 0 /100 WBCS — SIGNIFICANT CHANGE UP (ref 0–0)
PHOSPHATE SERPL-MCNC: 2.5 MG/DL — SIGNIFICANT CHANGE UP (ref 2.5–4.5)
PLATELET # BLD AUTO: 242 K/UL — SIGNIFICANT CHANGE UP (ref 150–400)
PLATELET # BLD AUTO: 244 K/UL — SIGNIFICANT CHANGE UP (ref 150–400)
POTASSIUM SERPL-MCNC: 4.3 MMOL/L — SIGNIFICANT CHANGE UP (ref 3.5–5.3)
POTASSIUM SERPL-MCNC: 4.4 MMOL/L — SIGNIFICANT CHANGE UP (ref 3.5–5.3)
POTASSIUM SERPL-SCNC: 4.3 MMOL/L — SIGNIFICANT CHANGE UP (ref 3.5–5.3)
POTASSIUM SERPL-SCNC: 4.4 MMOL/L — SIGNIFICANT CHANGE UP (ref 3.5–5.3)
PROCALCITONIN SERPL-MCNC: 4.36 NG/ML — HIGH (ref 0–0.04)
PROCALCITONIN SERPL-MCNC: 4.86 NG/ML — HIGH (ref 0–0.04)
RBC # BLD: 3.66 M/UL — LOW (ref 3.8–5.2)
RBC # BLD: 3.75 M/UL — LOW (ref 3.8–5.2)
RBC # FLD: 13.2 % — SIGNIFICANT CHANGE UP (ref 10.3–14.5)
RBC # FLD: 13.2 % — SIGNIFICANT CHANGE UP (ref 10.3–14.5)
SODIUM SERPL-SCNC: 138 MMOL/L — SIGNIFICANT CHANGE UP (ref 135–145)
SODIUM SERPL-SCNC: 139 MMOL/L — SIGNIFICANT CHANGE UP (ref 135–145)
WBC # BLD: 13.35 K/UL — HIGH (ref 3.8–10.5)
WBC # BLD: 13.88 K/UL — HIGH (ref 3.8–10.5)
WBC # FLD AUTO: 13.35 K/UL — HIGH (ref 3.8–10.5)
WBC # FLD AUTO: 13.88 K/UL — HIGH (ref 3.8–10.5)

## 2018-03-22 PROCEDURE — 99233 SBSQ HOSP IP/OBS HIGH 50: CPT

## 2018-03-22 RX ORDER — PIPERACILLIN AND TAZOBACTAM 4; .5 G/20ML; G/20ML
3.38 INJECTION, POWDER, LYOPHILIZED, FOR SOLUTION INTRAVENOUS EVERY 8 HOURS
Qty: 0 | Refills: 0 | Status: DISCONTINUED | OUTPATIENT
Start: 2018-03-22 | End: 2018-03-26

## 2018-03-22 RX ORDER — PIPERACILLIN AND TAZOBACTAM 4; .5 G/20ML; G/20ML
3.38 INJECTION, POWDER, LYOPHILIZED, FOR SOLUTION INTRAVENOUS EVERY 8 HOURS
Qty: 0 | Refills: 0 | Status: DISCONTINUED | OUTPATIENT
Start: 2018-03-22 | End: 2018-03-22

## 2018-03-22 RX ADMIN — MIRTAZAPINE 15 MILLIGRAM(S): 45 TABLET, ORALLY DISINTEGRATING ORAL at 23:19

## 2018-03-22 RX ADMIN — HEPARIN SODIUM 5000 UNIT(S): 5000 INJECTION INTRAVENOUS; SUBCUTANEOUS at 17:25

## 2018-03-22 RX ADMIN — PIPERACILLIN AND TAZOBACTAM 25 GRAM(S): 4; .5 INJECTION, POWDER, LYOPHILIZED, FOR SOLUTION INTRAVENOUS at 23:19

## 2018-03-22 RX ADMIN — Medication 150 MILLIGRAM(S): at 04:44

## 2018-03-22 RX ADMIN — HEPARIN SODIUM 5000 UNIT(S): 5000 INJECTION INTRAVENOUS; SUBCUTANEOUS at 05:18

## 2018-03-22 NOTE — PROGRESS NOTE ADULT - SUBJECTIVE AND OBJECTIVE BOX
Patient is a 89y old  Female who presents with a chief complaint of n/v (20 Mar 2018 04:10)      INTERVAL HPI/OVERNIGHT EVENTS: no acute events overnigt     MEDICATIONS  (STANDING):  dextrose 5%. 1000 milliLiter(s) (50 mL/Hr) IV Continuous <Continuous>  dextrose 50% Injectable 12.5 Gram(s) IV Push once  dextrose 50% Injectable 25 Gram(s) IV Push once  dextrose 50% Injectable 25 Gram(s) IV Push once  heparin  Injectable 5000 Unit(s) SubCutaneous every 12 hours  insulin lispro (HumaLOG) corrective regimen sliding scale   SubCutaneous three times a day before meals  mirtazapine 15 milliGRAM(s) Oral at bedtime  piperacillin/tazobactam IVPB. 3.375 Gram(s) IV Intermittent every 8 hours  sodium chloride 0.9%. 1000 milliLiter(s) (100 mL/Hr) IV Continuous <Continuous>    MEDICATIONS  (PRN):  ALBUTerol/ipratropium for Nebulization 3 milliLiter(s) Nebulizer every 6 hours PRN Shortness of Breath and/or Wheezing  dextrose Gel 1 Dose(s) Oral once PRN Blood Glucose LESS THAN 70 milliGRAM(s)/deciliter  glucagon  Injectable 1 milliGRAM(s) IntraMuscular once PRN Glucose LESS THAN 70 milligrams/deciliter      Allergies    PC Pen VK (Swelling)    Intolerances        REVIEW OF SYSTEMS:  unable to obtadin secondary to patient mental condition     Vital Signs Last 24 Hrs  T(C): 37.4 (22 Mar 2018 12:02), Max: 37.4 (22 Mar 2018 12:02)  T(F): 99.4 (22 Mar 2018 12:02), Max: 99.4 (22 Mar 2018 12:02)  HR: 127 (22 Mar 2018 12:02) (115 - 127)  BP: 144/93 (22 Mar 2018 12:02) (126/72 - 176/89)  BP(mean): --  RR: 18 (22 Mar 2018 12:02) (16 - 18)  SpO2: 93% (22 Mar 2018 12:02) (93% - 97%)    PHYSICAL EXAM:  GENERAL: NAD, HEAD:  Atraumatic, Normocephalic  EYES: EOMI, PERRLA, conjunctiva and sclera clear  ENMT: No tonsillar erythema, exudates, or enlargement; Moist mucous membranes, Good dentition, No lesions  NECK: Supple, No JVD, Normal thyroid  NERVOUS SYSTEM:  Alert awake not oriented CHEST/LUNG: Clear to percussion bilaterally rhonchi HEART: Regular rate and rhythm; No murmurs, rubs, or gallops  ABDOMEN: Soft, Nontender, Nondistended; Bowel sounds present  EXTREMITIES:  2+ Peripheral Pulses, No clubbing, cyanosis, or edema  LYMPH: No lymphadenopathy noted  SKIN: No rashes or lesions    LABS:                        11.4   13.35 )-----------( 242      ( 22 Mar 2018 08:51 )             33.7     03-22    138  |  107  |  19  ----------------------------<  106<H>  4.4   |  24  |  0.84    Ca    8.9      22 Mar 2018 08:51  Phos  2.5     03-22  Mg     1.9     03-22          CAPILLARY BLOOD GLUCOSE      POCT Blood Glucose.: 118 mg/dL (22 Mar 2018 12:05)  POCT Blood Glucose.: 112 mg/dL (22 Mar 2018 07:58)  POCT Blood Glucose.: 147 mg/dL (21 Mar 2018 21:58)  POCT Blood Glucose.: 114 mg/dL (21 Mar 2018 16:43)      RADIOLOGY & ADDITIONAL TESTS:    Imaging Personally Reviewed:  [X ] YES  [ ] NO    Consultant(s) Notes Reviewed:  [ X] YES  [ ] NO    Care Discussed with Consultants/Other Providers [ X] YES  [ ] NO

## 2018-03-23 DIAGNOSIS — Z71.89 OTHER SPECIFIED COUNSELING: ICD-10-CM

## 2018-03-23 LAB
ANION GAP SERPL CALC-SCNC: 8 MMOL/L — SIGNIFICANT CHANGE UP (ref 5–17)
BUN SERPL-MCNC: 21 MG/DL — SIGNIFICANT CHANGE UP (ref 7–23)
CALCIUM SERPL-MCNC: 9 MG/DL — SIGNIFICANT CHANGE UP (ref 8.5–10.1)
CHLORIDE SERPL-SCNC: 109 MMOL/L — HIGH (ref 96–108)
CO2 SERPL-SCNC: 23 MMOL/L — SIGNIFICANT CHANGE UP (ref 22–31)
CREAT SERPL-MCNC: 0.93 MG/DL — SIGNIFICANT CHANGE UP (ref 0.5–1.3)
GLUCOSE BLDC GLUCOMTR-MCNC: 132 MG/DL — HIGH (ref 70–99)
GLUCOSE BLDC GLUCOMTR-MCNC: 146 MG/DL — HIGH (ref 70–99)
GLUCOSE BLDC GLUCOMTR-MCNC: 172 MG/DL — HIGH (ref 70–99)
GLUCOSE SERPL-MCNC: 128 MG/DL — HIGH (ref 70–99)
HCT VFR BLD CALC: 31 % — LOW (ref 34.5–45)
HGB BLD-MCNC: 10.5 G/DL — LOW (ref 11.5–15.5)
MAGNESIUM SERPL-MCNC: 1.9 MG/DL — SIGNIFICANT CHANGE UP (ref 1.6–2.6)
MCHC RBC-ENTMCNC: 30.2 PG — SIGNIFICANT CHANGE UP (ref 27–34)
MCHC RBC-ENTMCNC: 33.9 GM/DL — SIGNIFICANT CHANGE UP (ref 32–36)
MCV RBC AUTO: 89.1 FL — SIGNIFICANT CHANGE UP (ref 80–100)
NRBC # BLD: 0 /100 WBCS — SIGNIFICANT CHANGE UP (ref 0–0)
PLATELET # BLD AUTO: 217 K/UL — SIGNIFICANT CHANGE UP (ref 150–400)
POTASSIUM SERPL-MCNC: 4.1 MMOL/L — SIGNIFICANT CHANGE UP (ref 3.5–5.3)
POTASSIUM SERPL-SCNC: 4.1 MMOL/L — SIGNIFICANT CHANGE UP (ref 3.5–5.3)
RBC # BLD: 3.48 M/UL — LOW (ref 3.8–5.2)
RBC # FLD: 13.2 % — SIGNIFICANT CHANGE UP (ref 10.3–14.5)
SODIUM SERPL-SCNC: 140 MMOL/L — SIGNIFICANT CHANGE UP (ref 135–145)
WBC # BLD: 11.49 K/UL — HIGH (ref 3.8–10.5)
WBC # FLD AUTO: 11.49 K/UL — HIGH (ref 3.8–10.5)

## 2018-03-23 PROCEDURE — 99233 SBSQ HOSP IP/OBS HIGH 50: CPT

## 2018-03-23 RX ADMIN — MIRTAZAPINE 15 MILLIGRAM(S): 45 TABLET, ORALLY DISINTEGRATING ORAL at 21:19

## 2018-03-23 RX ADMIN — HEPARIN SODIUM 5000 UNIT(S): 5000 INJECTION INTRAVENOUS; SUBCUTANEOUS at 17:17

## 2018-03-23 RX ADMIN — PIPERACILLIN AND TAZOBACTAM 25 GRAM(S): 4; .5 INJECTION, POWDER, LYOPHILIZED, FOR SOLUTION INTRAVENOUS at 13:25

## 2018-03-23 RX ADMIN — HEPARIN SODIUM 5000 UNIT(S): 5000 INJECTION INTRAVENOUS; SUBCUTANEOUS at 05:22

## 2018-03-23 RX ADMIN — PIPERACILLIN AND TAZOBACTAM 25 GRAM(S): 4; .5 INJECTION, POWDER, LYOPHILIZED, FOR SOLUTION INTRAVENOUS at 05:22

## 2018-03-23 RX ADMIN — PIPERACILLIN AND TAZOBACTAM 25 GRAM(S): 4; .5 INJECTION, POWDER, LYOPHILIZED, FOR SOLUTION INTRAVENOUS at 21:19

## 2018-03-23 NOTE — PHYSICAL THERAPY INITIAL EVALUATION ADULT - IMPAIRMENTS FOUND, PT EVAL
muscle strength/anthropometric characteristics/ergonomics and body mechanics/gait, locomotion, and balance/aerobic capacity/endurance/arousal, attention, and cognition

## 2018-03-23 NOTE — PROGRESS NOTE ADULT - SUBJECTIVE AND OBJECTIVE BOX
Patient is a 89y old  Female who presents with a chief complaint of n/v (20 Mar 2018 04:10)      INTERVAL HPI/OVERNIGHT EVENTS:  no acute events patient visited by guardians tooday DNR signed     MEDICATIONS  (STANDING):  dextrose 5%. 1000 milliLiter(s) (50 mL/Hr) IV Continuous <Continuous>  dextrose 50% Injectable 12.5 Gram(s) IV Push once  dextrose 50% Injectable 25 Gram(s) IV Push once  dextrose 50% Injectable 25 Gram(s) IV Push once  heparin  Injectable 5000 Unit(s) SubCutaneous every 12 hours  insulin lispro (HumaLOG) corrective regimen sliding scale   SubCutaneous three times a day before meals  mirtazapine 15 milliGRAM(s) Oral at bedtime  piperacillin/tazobactam IVPB. 3.375 Gram(s) IV Intermittent every 8 hours  sodium chloride 0.9%. 1000 milliLiter(s) (100 mL/Hr) IV Continuous <Continuous>    MEDICATIONS  (PRN):  ALBUTerol/ipratropium for Nebulization 3 milliLiter(s) Nebulizer every 6 hours PRN Shortness of Breath and/or Wheezing  dextrose Gel 1 Dose(s) Oral once PRN Blood Glucose LESS THAN 70 milliGRAM(s)/deciliter  glucagon  Injectable 1 milliGRAM(s) IntraMuscular once PRN Glucose LESS THAN 70 milligrams/deciliter      Allergies    PC Pen VK (Swelling)    Intolerances        REVIEW OF SYSTEMS:  unreliable given patient dementia     Vital Signs Last 24 Hrs  T(C): 36.8 (23 Mar 2018 13:15), Max: 38.1 (23 Mar 2018 11:14)  T(F): 98.3 (23 Mar 2018 13:15), Max: 100.6 (23 Mar 2018 11:14)  HR: 84 (23 Mar 2018 13:15) (80 - 133)  BP: 108/60 (23 Mar 2018 13:15) (108/60 - 133/63)  BP(mean): --  RR: 17 (23 Mar 2018 13:15) (16 - 27)  SpO2: 97% (23 Mar 2018 13:15) (92% - 97%)    PHYSICAL EXAM:  GENERAL: NAD, well-groomed, well-developed  HEAD:  Atraumatic, Normocephalic  EYES: EOMI, PERRLA, conjunctiva and sclera clear  ENMT: No tonsillar erythema, exudates, or enlargement; Moist mucous membranes, Good dentition, No lesions  NECK: Supple, No JVD, Normal thyroid  NERVOUS SYSTEM:  Alert  and awake verbal c 5/5 B/L upper and lower extremities; DTRs 2+ intact and symmetric  CHEST/LUNG: Clear to percussion bilaterally; No rales, rhonchi, wheezing, or rubs  HEART: Regular rate and rhythm; No murmurs, rubs, or gallops  ABDOMEN: Soft, Nontender, Nondistended; Bowel sounds present  EXTREMITIES:  2+ Peripheral Pulses, No clubbing, cyanosis, or edema  LYMPH: No lymphadenopathy noted  SKIN: No rashes or lesions    LABS:                        10.5   11.49 )-----------( 217      ( 23 Mar 2018 08:14 )             31.0     03-23    140  |  109<H>  |  21  ----------------------------<  128<H>  4.1   |  23  |  0.93    Ca    9.0      23 Mar 2018 08:14  Phos  2.5     03-22  Mg     1.9     03-23          CAPILLARY BLOOD GLUCOSE      POCT Blood Glucose.: 172 mg/dL (23 Mar 2018 11:35)  POCT Blood Glucose.: 132 mg/dL (23 Mar 2018 08:06)  POCT Blood Glucose.: 125 mg/dL (22 Mar 2018 23:10)  POCT Blood Glucose.: 130 mg/dL (22 Mar 2018 16:51)      RADIOLOGY & ADDITIONAL TESTS:    Imaging Personally Reviewed:  [X ] YES  [ ] NO    Consultant(s) Notes Reviewed:  [X ] YES  [ ] NO    Care Discussed with Consultants/Other Providers [ X] YES  [ ] NO

## 2018-03-23 NOTE — PROGRESS NOTE ADULT - ATTENDING COMMENTS
resolved leukocytosis mental status   no specific complaints   finish zosyn on sunday am  call prn
clinically the same, no fever, no cbc done today   Urine cx neg  cont current antibiotics

## 2018-03-23 NOTE — PROGRESS NOTE ADULT - SUBJECTIVE AND OBJECTIVE BOX
Patient is a 89y old  Female who presents with a chief complaint of n/v (20 Mar 2018 04:10)      INTERVAL HPI / OVERNIGHT EVENTS: more awake and alert,denies any complaints    MEDICATIONS  (STANDING):  dextrose 5%. 1000 milliLiter(s) (50 mL/Hr) IV Continuous <Continuous>  dextrose 50% Injectable 12.5 Gram(s) IV Push once  dextrose 50% Injectable 25 Gram(s) IV Push once  dextrose 50% Injectable 25 Gram(s) IV Push once  heparin  Injectable 5000 Unit(s) SubCutaneous every 12 hours  insulin lispro (HumaLOG) corrective regimen sliding scale   SubCutaneous three times a day before meals  mirtazapine 15 milliGRAM(s) Oral at bedtime  piperacillin/tazobactam IVPB. 3.375 Gram(s) IV Intermittent every 8 hours  sodium chloride 0.9%. 1000 milliLiter(s) (100 mL/Hr) IV Continuous <Continuous>    MEDICATIONS  (PRN):  ALBUTerol/ipratropium for Nebulization 3 milliLiter(s) Nebulizer every 6 hours PRN Shortness of Breath and/or Wheezing  dextrose Gel 1 Dose(s) Oral once PRN Blood Glucose LESS THAN 70 milliGRAM(s)/deciliter  glucagon  Injectable 1 milliGRAM(s) IntraMuscular once PRN Glucose LESS THAN 70 milligrams/deciliter      Vital Signs Last 24 Hrs  Tmax :afebrile    PHYSICAL EXAM:  General :NAD  Constitutional:  well-groomed, well-developed  Respiratory: CTAB/L  Cardiovascular: S1 and S2, RRR, no M/G/R  Gastrointestinal: BS+, soft, NT/ND  Extremities: No peripheral edema  Vascular: 2+ peripheral pulses  Skin: No rashes      LABS:             WBC 28-->11  cr WNL            MICROBIOLOGY:  RECENT CULTURES:  03-20 .Urine Clean Catch (Midstream) XXXX XXXX   No growth    03-20 .Blood Blood-Peripheral XXXX XXXX   No growth to date.          RADIOLOGY & ADDITIONAL STUDIES:

## 2018-03-23 NOTE — PHYSICAL THERAPY INITIAL EVALUATION ADULT - PLANNED THERAPY INTERVENTIONS, PT EVAL
postural re-education/balance training/bed mobility training/gait training/strengthening/transfer training

## 2018-03-23 NOTE — PHYSICAL THERAPY INITIAL EVALUATION ADULT - GENERAL OBSERVATIONS, REHAB EVAL
Found supine,awake, c/o pain and sensitivity both LE even with light palpation, unable to tolerate both LE being moved.

## 2018-03-23 NOTE — PHYSICAL THERAPY INITIAL EVALUATION ADULT - COORDINATION ASSESSED, REHAB EVAL
impaired due to pain and weakness in the joints and muscles/heel to shin/finger to nose impaired due to pain and weakness in the joints and muscles/finger to nose/heel to shin

## 2018-03-24 LAB
ANION GAP SERPL CALC-SCNC: 9 MMOL/L — SIGNIFICANT CHANGE UP (ref 5–17)
BUN SERPL-MCNC: 20 MG/DL — SIGNIFICANT CHANGE UP (ref 7–23)
CALCIUM SERPL-MCNC: 9.1 MG/DL — SIGNIFICANT CHANGE UP (ref 8.5–10.1)
CHLORIDE SERPL-SCNC: 109 MMOL/L — HIGH (ref 96–108)
CO2 SERPL-SCNC: 20 MMOL/L — LOW (ref 22–31)
CREAT SERPL-MCNC: 0.91 MG/DL — SIGNIFICANT CHANGE UP (ref 0.5–1.3)
GLUCOSE BLDC GLUCOMTR-MCNC: 108 MG/DL — HIGH (ref 70–99)
GLUCOSE BLDC GLUCOMTR-MCNC: 109 MG/DL — HIGH (ref 70–99)
GLUCOSE BLDC GLUCOMTR-MCNC: 129 MG/DL — HIGH (ref 70–99)
GLUCOSE BLDC GLUCOMTR-MCNC: 132 MG/DL — HIGH (ref 70–99)
GLUCOSE BLDC GLUCOMTR-MCNC: 136 MG/DL — HIGH (ref 70–99)
GLUCOSE SERPL-MCNC: 105 MG/DL — HIGH (ref 70–99)
HCT VFR BLD CALC: 34 % — LOW (ref 34.5–45)
HGB BLD-MCNC: 11.5 G/DL — SIGNIFICANT CHANGE UP (ref 11.5–15.5)
MCHC RBC-ENTMCNC: 30.8 PG — SIGNIFICANT CHANGE UP (ref 27–34)
MCHC RBC-ENTMCNC: 33.8 GM/DL — SIGNIFICANT CHANGE UP (ref 32–36)
MCV RBC AUTO: 91.2 FL — SIGNIFICANT CHANGE UP (ref 80–100)
NRBC # BLD: 0 /100 WBCS — SIGNIFICANT CHANGE UP (ref 0–0)
PLATELET # BLD AUTO: 214 K/UL — SIGNIFICANT CHANGE UP (ref 150–400)
POTASSIUM SERPL-MCNC: 4.5 MMOL/L — SIGNIFICANT CHANGE UP (ref 3.5–5.3)
POTASSIUM SERPL-SCNC: 4.5 MMOL/L — SIGNIFICANT CHANGE UP (ref 3.5–5.3)
RBC # BLD: 3.73 M/UL — LOW (ref 3.8–5.2)
RBC # FLD: 13.1 % — SIGNIFICANT CHANGE UP (ref 10.3–14.5)
SODIUM SERPL-SCNC: 138 MMOL/L — SIGNIFICANT CHANGE UP (ref 135–145)
WBC # BLD: 9.7 K/UL — SIGNIFICANT CHANGE UP (ref 3.8–10.5)
WBC # FLD AUTO: 9.7 K/UL — SIGNIFICANT CHANGE UP (ref 3.8–10.5)

## 2018-03-24 PROCEDURE — 99233 SBSQ HOSP IP/OBS HIGH 50: CPT

## 2018-03-24 RX ORDER — IBUPROFEN 200 MG
400 TABLET ORAL
Qty: 0 | Refills: 0 | Status: DISCONTINUED | OUTPATIENT
Start: 2018-03-24 | End: 2018-03-26

## 2018-03-24 RX ADMIN — PIPERACILLIN AND TAZOBACTAM 25 GRAM(S): 4; .5 INJECTION, POWDER, LYOPHILIZED, FOR SOLUTION INTRAVENOUS at 13:08

## 2018-03-24 RX ADMIN — HEPARIN SODIUM 5000 UNIT(S): 5000 INJECTION INTRAVENOUS; SUBCUTANEOUS at 17:07

## 2018-03-24 RX ADMIN — PIPERACILLIN AND TAZOBACTAM 25 GRAM(S): 4; .5 INJECTION, POWDER, LYOPHILIZED, FOR SOLUTION INTRAVENOUS at 21:13

## 2018-03-24 RX ADMIN — MIRTAZAPINE 15 MILLIGRAM(S): 45 TABLET, ORALLY DISINTEGRATING ORAL at 21:13

## 2018-03-24 RX ADMIN — PIPERACILLIN AND TAZOBACTAM 25 GRAM(S): 4; .5 INJECTION, POWDER, LYOPHILIZED, FOR SOLUTION INTRAVENOUS at 05:20

## 2018-03-24 RX ADMIN — HEPARIN SODIUM 5000 UNIT(S): 5000 INJECTION INTRAVENOUS; SUBCUTANEOUS at 05:20

## 2018-03-24 NOTE — PROGRESS NOTE ADULT - SUBJECTIVE AND OBJECTIVE BOX
Patient is a 89y old  Female who presents with a chief complaint of n/v (20 Mar 2018 04:10)      INTERVAL HPI/OVERNIGHT EVENTS: no acute events overnight unable to eat food secondary to gum irriation     MEDICATIONS  (STANDING):  dextrose 5%. 1000 milliLiter(s) (50 mL/Hr) IV Continuous <Continuous>  dextrose 50% Injectable 12.5 Gram(s) IV Push once  dextrose 50% Injectable 25 Gram(s) IV Push once  dextrose 50% Injectable 25 Gram(s) IV Push once  heparin  Injectable 5000 Unit(s) SubCutaneous every 12 hours  insulin lispro (HumaLOG) corrective regimen sliding scale   SubCutaneous three times a day before meals  mirtazapine 15 milliGRAM(s) Oral at bedtime  piperacillin/tazobactam IVPB. 3.375 Gram(s) IV Intermittent every 8 hours    MEDICATIONS  (PRN):  ALBUTerol/ipratropium for Nebulization 3 milliLiter(s) Nebulizer every 6 hours PRN Shortness of Breath and/or Wheezing  dextrose Gel 1 Dose(s) Oral once PRN Blood Glucose LESS THAN 70 milliGRAM(s)/deciliter  glucagon  Injectable 1 milliGRAM(s) IntraMuscular once PRN Glucose LESS THAN 70 milligrams/deciliter      Allergies    PC Pen VK (Swelling)    Intolerances        REVIEW OF SYSTEMS:  unable to obtain secondary to patient condition   Vital Signs Last 24 Hrs  T(C): 37.7 (24 Mar 2018 11:17), Max: 37.7 (24 Mar 2018 11:17)  T(F): 99.8 (24 Mar 2018 11:17), Max: 99.8 (24 Mar 2018 11:17)  HR: 94 (24 Mar 2018 11:17) (91 - 95)  BP: 164/79 (24 Mar 2018 11:17) (125/57 - 164/79)  BP(mean): --  RR: 18 (24 Mar 2018 11:17) (16 - 18)  SpO2: 97% (24 Mar 2018 11:17) (94% - 97%)    PHYSICAL EXAM:  GENERAL: NAD, well-groomed, well-developed  HEAD:  Atraumatic, Normocephalic  EYES: EOMI, PERRLA, conjunctiva and sclera clear  ENMT: No tonsillar erythema, exudates, or enlargement; Moist mucous membranes, Good dentition, No lesions  NECK: Supple, No JVD, Normal thyroid  NERVOUS SYSTEM:  Alert & awake oriented to persdion   CHEST/LUNG: Clear to percussion bilaterally; No rales, rhonchi, wheezing, or rubs  HEART: Regular rate and rhythm; No murmurs, rubs, or gallops  ABDOMEN: Soft, Nontender, Nondistended; Bowel sounds present  EXTREMITIES:  2+ Peripheral Pulses, No clubbing, cyanosis, or edema  LYMPH: No lymphadenopathy noted  SKIN: No rashes or lesions    LABS:                        11.5   9.70  )-----------( 214      ( 24 Mar 2018 09:54 )             34.0     03-24    138  |  109<H>  |  20  ----------------------------<  105<H>  4.5   |  20<L>  |  0.91    Ca    9.1      24 Mar 2018 09:14  Mg     1.9     03-23          CAPILLARY BLOOD GLUCOSE      POCT Blood Glucose.: 109 mg/dL (24 Mar 2018 10:52)  POCT Blood Glucose.: 108 mg/dL (24 Mar 2018 07:43)  POCT Blood Glucose.: 132 mg/dL (24 Mar 2018 00:03)  POCT Blood Glucose.: 146 mg/dL (23 Mar 2018 16:08)      RADIOLOGY & ADDITIONAL TESTS:    Imaging Personally Reviewed:  [X] YES  [ ] NO    Consultant(s) Notes Reviewed:  [X ] YES  [ ] NO    Care Discussed with Consultants/Other Providers [X ] YES  [ ] NO

## 2018-03-25 LAB
ANION GAP SERPL CALC-SCNC: 6 MMOL/L — SIGNIFICANT CHANGE UP (ref 5–17)
BUN SERPL-MCNC: 18 MG/DL — SIGNIFICANT CHANGE UP (ref 7–23)
CALCIUM SERPL-MCNC: 9.3 MG/DL — SIGNIFICANT CHANGE UP (ref 8.5–10.1)
CHLORIDE SERPL-SCNC: 108 MMOL/L — SIGNIFICANT CHANGE UP (ref 96–108)
CO2 SERPL-SCNC: 26 MMOL/L — SIGNIFICANT CHANGE UP (ref 22–31)
CREAT SERPL-MCNC: 0.91 MG/DL — SIGNIFICANT CHANGE UP (ref 0.5–1.3)
CULTURE RESULTS: SIGNIFICANT CHANGE UP
CULTURE RESULTS: SIGNIFICANT CHANGE UP
GLUCOSE BLDC GLUCOMTR-MCNC: 141 MG/DL — HIGH (ref 70–99)
GLUCOSE BLDC GLUCOMTR-MCNC: 192 MG/DL — HIGH (ref 70–99)
GLUCOSE BLDC GLUCOMTR-MCNC: 263 MG/DL — HIGH (ref 70–99)
GLUCOSE BLDC GLUCOMTR-MCNC: 280 MG/DL — HIGH (ref 70–99)
GLUCOSE SERPL-MCNC: 199 MG/DL — HIGH (ref 70–99)
HCT VFR BLD CALC: 33.5 % — LOW (ref 34.5–45)
HGB BLD-MCNC: 11 G/DL — LOW (ref 11.5–15.5)
MCHC RBC-ENTMCNC: 29.6 PG — SIGNIFICANT CHANGE UP (ref 27–34)
MCHC RBC-ENTMCNC: 32.8 GM/DL — SIGNIFICANT CHANGE UP (ref 32–36)
MCV RBC AUTO: 90.3 FL — SIGNIFICANT CHANGE UP (ref 80–100)
NRBC # BLD: 0 /100 WBCS — SIGNIFICANT CHANGE UP (ref 0–0)
PLATELET # BLD AUTO: 251 K/UL — SIGNIFICANT CHANGE UP (ref 150–400)
POTASSIUM SERPL-MCNC: 3.9 MMOL/L — SIGNIFICANT CHANGE UP (ref 3.5–5.3)
POTASSIUM SERPL-SCNC: 3.9 MMOL/L — SIGNIFICANT CHANGE UP (ref 3.5–5.3)
RBC # BLD: 3.71 M/UL — LOW (ref 3.8–5.2)
RBC # FLD: 13.2 % — SIGNIFICANT CHANGE UP (ref 10.3–14.5)
SODIUM SERPL-SCNC: 140 MMOL/L — SIGNIFICANT CHANGE UP (ref 135–145)
SPECIMEN SOURCE: SIGNIFICANT CHANGE UP
SPECIMEN SOURCE: SIGNIFICANT CHANGE UP
WBC # BLD: 11.68 K/UL — HIGH (ref 3.8–10.5)
WBC # FLD AUTO: 11.68 K/UL — HIGH (ref 3.8–10.5)

## 2018-03-25 PROCEDURE — 99233 SBSQ HOSP IP/OBS HIGH 50: CPT

## 2018-03-25 RX ADMIN — PIPERACILLIN AND TAZOBACTAM 25 GRAM(S): 4; .5 INJECTION, POWDER, LYOPHILIZED, FOR SOLUTION INTRAVENOUS at 13:00

## 2018-03-25 RX ADMIN — HEPARIN SODIUM 5000 UNIT(S): 5000 INJECTION INTRAVENOUS; SUBCUTANEOUS at 05:17

## 2018-03-25 RX ADMIN — Medication 20 MILLIGRAM(S): at 15:36

## 2018-03-25 RX ADMIN — PIPERACILLIN AND TAZOBACTAM 25 GRAM(S): 4; .5 INJECTION, POWDER, LYOPHILIZED, FOR SOLUTION INTRAVENOUS at 05:17

## 2018-03-25 RX ADMIN — MIRTAZAPINE 15 MILLIGRAM(S): 45 TABLET, ORALLY DISINTEGRATING ORAL at 21:34

## 2018-03-25 RX ADMIN — Medication 2: at 11:34

## 2018-03-25 RX ADMIN — HEPARIN SODIUM 5000 UNIT(S): 5000 INJECTION INTRAVENOUS; SUBCUTANEOUS at 17:05

## 2018-03-25 RX ADMIN — Medication 6: at 15:36

## 2018-03-25 RX ADMIN — PIPERACILLIN AND TAZOBACTAM 25 GRAM(S): 4; .5 INJECTION, POWDER, LYOPHILIZED, FOR SOLUTION INTRAVENOUS at 21:33

## 2018-03-25 NOTE — PROGRESS NOTE ADULT - PROBLEM SELECTOR PROBLEM 8
Chronic Bronchitis with Emphysema
Chronic Bronchitis with Emphysema
Chronic congestive heart failure, unspecified congestive heart failure type
Chronic Bronchitis with Emphysema
Chronic Bronchitis with Emphysema

## 2018-03-25 NOTE — PROGRESS NOTE ADULT - ASSESSMENT
89 yr old female seen with
89 y/o female w/advanced dementia, Anxiety, lower back pain,cad, chf, LBBB,  glaucoma, DM2 HTN, CAD, OA and macular degeneration sent from NH for  for non bilious vomiting w/pna    appears improved today continue to monitor
89 y/o female w/advanced dementia, Anxiety, lower back pain,cad, chf, LBBB,  glaucoma, DM2 HTN, CAD, OA and macular degeneration sent from NH for  for non bilious vomiting w/pna    elevated procalcitonin likley a pneumonia unsure of true PCN allergies will broaden coverage with zosyn to cover presumed gram negative complex pneumonia
89 y/o female w/advanced dementia, Anxiety, lower back pain,cad, chf, LBBB,  glaucoma, DM2 HTN, CAD, OA and macular degeneration sent from NH for  for non bilious vomiting w/pna    elevated procalcitonin likley a pneumonia unsure of true PCN allergies will broaden coverage with zosyn to cover presumed gram negative complex pneumonia     improved DNR signed
89 yr old female seen with
89 y/o female w/advanced dementia, Anxiety, lower back pain,cad, chf, LBBB,  glaucoma, DM2 HTN, CAD, OA and macular degeneration sent from NH for  for non bilious vomiting w/pna    elevated procalcitonin likley a pneumonia unsure of true PCN allergies broad coverage with zosyn to cover presumed gram negative complex pneumonia     improved DNR signed    much improved plan for dc [facility] monday
89 y/o female w/advanced dementia, Anxiety, lower back pain,cad, chf, LBBB,  glaucoma, DM2 HTN, CAD, OA and macular degeneration sent from NH for  for non bilious vomiting w/pna    elevated procalcitonin likley a pneumonia unsure of true PCN allergies will broaden coverage with zosyn to cover presumed gram negative complex pneumonia     improved DNR signed    much improved plan for dc [facility] monday

## 2018-03-25 NOTE — PROGRESS NOTE ADULT - PROBLEM SELECTOR PROBLEM 3
WADE (acute kidney injury)
Acute metabolic encephalopathy
Acute metabolic encephalopathy
WADE (acute kidney injury)

## 2018-03-25 NOTE — PROGRESS NOTE ADULT - PROBLEM SELECTOR PLAN 3
resolved
iv hydration and monitor

## 2018-03-25 NOTE — PROGRESS NOTE ADULT - SUBJECTIVE AND OBJECTIVE BOX
Patient is a 89y old  Female who presents with a chief complaint of n/v (20 Mar 2018 04:10)      INTERVAL HPI/OVERNIGHT EVENTS: no acute events     MEDICATIONS  (STANDING):  dextrose 5%. 1000 milliLiter(s) (50 mL/Hr) IV Continuous <Continuous>  dextrose 50% Injectable 12.5 Gram(s) IV Push once  dextrose 50% Injectable 25 Gram(s) IV Push once  dextrose 50% Injectable 25 Gram(s) IV Push once  enalapril 20 milliGRAM(s) Oral daily  heparin  Injectable 5000 Unit(s) SubCutaneous every 12 hours  insulin lispro (HumaLOG) corrective regimen sliding scale   SubCutaneous three times a day before meals  mirtazapine 15 milliGRAM(s) Oral at bedtime  piperacillin/tazobactam IVPB. 3.375 Gram(s) IV Intermittent every 8 hours    MEDICATIONS  (PRN):  ALBUTerol/ipratropium for Nebulization 3 milliLiter(s) Nebulizer every 6 hours PRN Shortness of Breath and/or Wheezing  dextrose Gel 1 Dose(s) Oral once PRN Blood Glucose LESS THAN 70 milliGRAM(s)/deciliter  glucagon  Injectable 1 milliGRAM(s) IntraMuscular once PRN Glucose LESS THAN 70 milligrams/deciliter  ibuprofen  Tablet 400 milliGRAM(s) Oral four times a day PRN pain      Allergies    PC Pen VK (Swelling)    Intolerances        REVIEW OF SYSTEMS:  gives no complaints however this is unreliable given patient's condition   Vital Signs Last 24 Hrs  T(C): 37.7 (25 Mar 2018 16:14), Max: 37.7 (25 Mar 2018 16:14)  T(F): 99.9 (25 Mar 2018 16:14), Max: 99.9 (25 Mar 2018 16:14)  HR: 117 (25 Mar 2018 16:14) (81 - 117)  BP: 141/72 (25 Mar 2018 16:14) (141/64 - 170/72)  BP(mean): --  RR: 19 (25 Mar 2018 16:14) (16 - 19)  SpO2: 94% (25 Mar 2018 16:14) (94% - 98%)    PHYSICAL EXAM:  GENERAL: NAD, well-groomed,  malnourished   HEAD:  Atraumatic, Normocephalic  EYES: EOMI, PERRLA, conjunctiva and sclera clear  ENMT: No tonsillar erythema, exudates, or enlargement; Moist mucous membranes, Good dentition, No lesions  NECK: Supple, No JVD, Normal thyroid  NERVOUS SYSTEM:  Alert & Oriented X3, Good concentration; Motor Strength 5/5 B/L upper and lower extremities; DTRs 2+ intact and symmetric  CHEST/LUNG: Clear to percussion bilaterally; No rales, rhonchi, wheezing, or rubs  HEART: Regular rate and rhythm; No murmurs, rubs, or gallops  ABDOMEN: Soft, Nontender, mild distrended Bowel sounds present  EXTREMITIES:  2+ Peripheral Pulses, No clubbing, cyanosis, or edema  LYMPH: No lymphadenopathy noted  SKIN: No rashes or lesions    LABS:                        11.0   11.68 )-----------( 251      ( 25 Mar 2018 10:42 )             33.5     03-25    140  |  108  |  18  ----------------------------<  199<H>  3.9   |  26  |  0.91    Ca    9.3      25 Mar 2018 10:42          CAPILLARY BLOOD GLUCOSE      POCT Blood Glucose.: 280 mg/dL (25 Mar 2018 15:35)  POCT Blood Glucose.: 192 mg/dL (25 Mar 2018 11:32)  POCT Blood Glucose.: 141 mg/dL (25 Mar 2018 08:06)  POCT Blood Glucose.: 136 mg/dL (24 Mar 2018 21:43)

## 2018-03-25 NOTE — PROGRESS NOTE ADULT - PROBLEM SELECTOR PROBLEM 6
Type 2 diabetes mellitus with hyperglycemia, without long-term current use of insulin

## 2018-03-25 NOTE — PROGRESS NOTE ADULT - PROVIDER SPECIALTY LIST ADULT
Hospitalist
Infectious Disease
Infectious Disease
Nephrology
Hospitalist
Hospitalist

## 2018-03-25 NOTE — PROGRESS NOTE ADULT - PROBLEM SELECTOR PROBLEM 4
Osteoarthritis, unspecified osteoarthritis type, unspecified site
WADE (acute kidney injury)
WADE (acute kidney injury)
Osteoarthritis, unspecified osteoarthritis type, unspecified site

## 2018-03-25 NOTE — PROGRESS NOTE ADULT - PROBLEM SELECTOR PROBLEM 2
Dementia without behavioral disturbance, unspecified dementia type
Leukocytosis, unspecified type
Leukocytosis, unspecified type
Dementia without behavioral disturbance, unspecified dementia type

## 2018-03-25 NOTE — PROGRESS NOTE ADULT - PROBLEM SELECTOR PROBLEM 1
Pneumonia of right middle lobe due to infectious organism
Acute cystitis without hematuria
Pneumonia of right middle lobe due to infectious organism
Acute cystitis without hematuria
Pneumonia of right middle lobe due to infectious organism

## 2018-03-25 NOTE — PROGRESS NOTE ADULT - PROBLEM SELECTOR PROBLEM 10
Goals of care, counseling/discussion
Goals of care, counseling/discussion
Preventive measure
Goals of care, counseling/discussion

## 2018-03-25 NOTE — PROGRESS NOTE ADULT - PROBLEM SELECTOR PLAN 7
volume depleted currently, gentle hydration w/close observation, unknown ef

## 2018-03-25 NOTE — PROGRESS NOTE ADULT - PROBLEM SELECTOR PLAN 1
admit to medicine  blood cultures No growth to date   urine cx  vanco/levquin pcn allergic and unable to elucidate allergy  id consult  speech/swallow eval
admit to medicine  blood cultures  urine cx  vanco/levquin pcn allergic and unable to elucidate allergy  id consult  speech/swallow eval
admit to medicine  blood cultures  urine cx  vanco/levquin pcn allergic and unable to elucidate allergy  id consult  speech/swallow eval
admit to medicine  blood cultures No growth to date   urine cx  vanco/levquin pcn allergic and unable to elucidate allergy  id consult  speech/swallow eval
admit to medicine  blood cultures No growth to date   urine cx  vanco/levquin pcn allergic and unable to elucidate allergy  id consult  speech/swallow eval

## 2018-03-25 NOTE — PROGRESS NOTE ADULT - PROBLEM/PLAN-8
Yes
DISPLAY PLAN FREE TEXT

## 2018-03-25 NOTE — PROGRESS NOTE ADULT - PROBLEM SELECTOR PLAN 8
duo neub prn
duo neub prn
volume depleted currently, gentle hydration w/close observation, unknown ef
duo neub prn
duo neub prn

## 2018-03-25 NOTE — PROGRESS NOTE ADULT - PROBLEM SELECTOR PROBLEM 9
Preventive measure
Preventive measure
Chronic Bronchitis with Emphysema
Preventive measure
Preventive measure

## 2018-03-25 NOTE — PROGRESS NOTE ADULT - PROBLEM SELECTOR PROBLEM 5
Chronic Bronchitis with Emphysema
Chronic Bronchitis with Emphysema
Diarrhea, unspecified type

## 2018-03-26 ENCOUNTER — TRANSCRIPTION ENCOUNTER (OUTPATIENT)
Age: 83
End: 2018-03-26

## 2018-03-26 VITALS — WEIGHT: 106.26 LBS

## 2018-03-26 LAB
ANION GAP SERPL CALC-SCNC: 8 MMOL/L — SIGNIFICANT CHANGE UP (ref 5–17)
BUN SERPL-MCNC: 24 MG/DL — HIGH (ref 7–23)
CALCIUM SERPL-MCNC: 9.2 MG/DL — SIGNIFICANT CHANGE UP (ref 8.5–10.1)
CHLORIDE SERPL-SCNC: 109 MMOL/L — HIGH (ref 96–108)
CO2 SERPL-SCNC: 26 MMOL/L — SIGNIFICANT CHANGE UP (ref 22–31)
CREAT SERPL-MCNC: 0.92 MG/DL — SIGNIFICANT CHANGE UP (ref 0.5–1.3)
GLUCOSE BLDC GLUCOMTR-MCNC: 159 MG/DL — HIGH (ref 70–99)
GLUCOSE BLDC GLUCOMTR-MCNC: 206 MG/DL — HIGH (ref 70–99)
GLUCOSE SERPL-MCNC: 156 MG/DL — HIGH (ref 70–99)
HCT VFR BLD CALC: 30.9 % — LOW (ref 34.5–45)
HGB BLD-MCNC: 10.4 G/DL — LOW (ref 11.5–15.5)
MCHC RBC-ENTMCNC: 30.3 PG — SIGNIFICANT CHANGE UP (ref 27–34)
MCHC RBC-ENTMCNC: 33.7 GM/DL — SIGNIFICANT CHANGE UP (ref 32–36)
MCV RBC AUTO: 90.1 FL — SIGNIFICANT CHANGE UP (ref 80–100)
NRBC # BLD: 0 /100 WBCS — SIGNIFICANT CHANGE UP (ref 0–0)
PLATELET # BLD AUTO: 208 K/UL — SIGNIFICANT CHANGE UP (ref 150–400)
POTASSIUM SERPL-MCNC: 3.7 MMOL/L — SIGNIFICANT CHANGE UP (ref 3.5–5.3)
POTASSIUM SERPL-SCNC: 3.7 MMOL/L — SIGNIFICANT CHANGE UP (ref 3.5–5.3)
RBC # BLD: 3.43 M/UL — LOW (ref 3.8–5.2)
RBC # FLD: 13.4 % — SIGNIFICANT CHANGE UP (ref 10.3–14.5)
SODIUM SERPL-SCNC: 143 MMOL/L — SIGNIFICANT CHANGE UP (ref 135–145)
WBC # BLD: 9.97 K/UL — SIGNIFICANT CHANGE UP (ref 3.8–10.5)
WBC # FLD AUTO: 9.97 K/UL — SIGNIFICANT CHANGE UP (ref 3.8–10.5)

## 2018-03-26 PROCEDURE — 99239 HOSP IP/OBS DSCHRG MGMT >30: CPT

## 2018-03-26 RX ORDER — SODIUM CHLORIDE 9 MG/ML
500 INJECTION INTRAMUSCULAR; INTRAVENOUS; SUBCUTANEOUS ONCE
Qty: 0 | Refills: 0 | Status: COMPLETED | OUTPATIENT
Start: 2018-03-26 | End: 2018-03-26

## 2018-03-26 RX ADMIN — Medication 400 MILLIGRAM(S): at 12:56

## 2018-03-26 RX ADMIN — Medication 20 MILLIGRAM(S): at 11:53

## 2018-03-26 RX ADMIN — HEPARIN SODIUM 5000 UNIT(S): 5000 INJECTION INTRAVENOUS; SUBCUTANEOUS at 05:40

## 2018-03-26 RX ADMIN — Medication 2: at 08:31

## 2018-03-26 RX ADMIN — Medication 4: at 11:44

## 2018-03-26 RX ADMIN — SODIUM CHLORIDE 500 MILLILITER(S): 9 INJECTION INTRAMUSCULAR; INTRAVENOUS; SUBCUTANEOUS at 15:19

## 2018-03-26 RX ADMIN — PIPERACILLIN AND TAZOBACTAM 25 GRAM(S): 4; .5 INJECTION, POWDER, LYOPHILIZED, FOR SOLUTION INTRAVENOUS at 05:40

## 2018-03-26 RX ADMIN — Medication 400 MILLIGRAM(S): at 11:43

## 2018-03-26 NOTE — DISCHARGE NOTE ADULT - SECONDARY DIAGNOSIS.
Acute metabolic encephalopathy Pneumonia due to infectious organism, unspecified laterality, unspecified part of lung Acute cystitis without hematuria Diarrhea, unspecified type WADE (acute kidney injury) Moderate protein malnutrition

## 2018-03-26 NOTE — DISCHARGE NOTE ADULT - PLAN OF CARE
Resolved Secondary to Gram negative pneumonia and Acute cystitis Pt is presumed to be back to her baseline Dementia Presumed gram negative Right middle and Lower lobe pneumonia, IV antibiotic regimen completed. Completed IV antibiotic treatment.   Urine cultures negative. Resolved. Resolved with IV fluids BMI <19 Continue to encourage oral intake.   Ensure can TID

## 2018-03-26 NOTE — DISCHARGE NOTE ADULT - CARE PLAN
Principal Discharge DX:	Sepsis, due to unspecified organism  Goal:	Resolved  Assessment and plan of treatment:	Secondary to Gram negative pneumonia and Acute cystitis  Secondary Diagnosis:	Acute metabolic encephalopathy  Goal:	Resolved  Assessment and plan of treatment:	Pt is presumed to be back to her baseline Dementia  Secondary Diagnosis:	Pneumonia due to infectious organism, unspecified laterality, unspecified part of lung  Goal:	Resolved  Assessment and plan of treatment:	Presumed gram negative Right middle and Lower lobe pneumonia, IV antibiotic regimen completed.  Secondary Diagnosis:	Acute cystitis without hematuria  Goal:	Resolved  Assessment and plan of treatment:	Completed IV antibiotic treatment.   Urine cultures negative.  Secondary Diagnosis:	Diarrhea, unspecified type  Goal:	Resolved.  Secondary Diagnosis:	WADE (acute kidney injury)  Goal:	Resolved with IV fluids  Secondary Diagnosis:	Moderate protein malnutrition  Goal:	BMI <19  Assessment and plan of treatment:	Continue to encourage oral intake.   Ensure can TID

## 2018-03-26 NOTE — DISCHARGE NOTE ADULT - PATIENT PORTAL LINK FT
You can access the [facility] Patient Portal, offered by [facility], by registering with the following website: [email or web address]

## 2018-03-26 NOTE — DISCHARGE NOTE ADULT - HOSPITAL COURSE
Pt is a 89 y/o female w/advanced dementia, Anxiety, lower back pain,cad, chf, LBBB,  glaucoma, DM2 HTN, CAD, OA and macular degeneration sent from NH for  for non bilious vomiting and diarrhea. Pt unable to contribute, earlier complained of ha now w/o complaints except need to urinate. Pt is a 89 y/o female w/advanced dementia, Anxiety, lower back pain,cad, chf, LBBB,  glaucoma, DM2 HTN, CAD, OA and macular degeneration was BIBA from [facility] for non bilious vomiting and diarrhea. The patient was agitated on arrival.     ER course: Head CT and CXR showed no acute changes, Sepsis present on admission: septic encephalopathy,  Afebrile, + leukocytosis, Lactate 6.8, moderate Leukocyte Esterase and >50 WBC.  CT abdominal/ Pelvis was significant for right middle and lower lobe PNA.     The patient was admitted to medical bed and was started on IV broad Spectrum Antibiotics. Infectious Disease and Nephrology were consulted and followed the patient. During hospital course the leukocytosis improved and vomiting and diarrhea resolved. Speech and swallow evaluated the patient.  The patient's renal function improved with IVF. The patient completed 7 day IV antibiotic course and her mentation has return to baseline. Pt is a 89 y/o female w/advanced dementia, Anxiety, lower back pain,cad, chf, LBBB,  glaucoma, DM2 HTN, CAD, OA and macular degeneration was BIBA from [facility] for non bilious vomiting and diarrhea. The patient was agitated on arrival.     ER course: Head CT and CXR showed no acute changes, Sepsis present on admission: septic encephalopathy,  Afebrile, + leukocytosis, Lactate 6.8, moderate Leukocyte Esterase and >50 WBC.  CT abdominal/ Pelvis was significant for right middle and lower lobe PNA.     The patient was admitted to medical bed and was started on IV broad Spectrum Antibiotics. Infectious Disease and Nephrology were consulted and followed the patient. During hospital course the leukocytosis improved and vomiting and diarrhea resolved. Speech and swallow evaluated the patient.  The patient's renal function improved with IVF. The patient completed 7 day IV antibiotic course and her mentation has return to baseline. Blood and urine cultures were negative.

## 2018-03-26 NOTE — DISCHARGE NOTE ADULT - MEDICATION SUMMARY - MEDICATIONS TO TAKE
I will START or STAY ON the medications listed below when I get home from the hospital:    acetaminophen 500 mg oral tablet  -- 2 tab(s) by mouth every 6 hours  -- This product contains acetaminophen.  Do not use  with any other product containing acetaminophen to prevent possible liver damage.    -- Indication: For Pain/Fever     Aspir 81  -- chewable  -- Indication: For Preventive measure    celecoxib 200 mg oral capsule  -- 200 milligram(s) by mouth once a day  -- Indication: For Dementia without behavioral disturbance, unspecified dementia type    enalapril 20 mg oral tablet  -- 1 tab(s) by mouth once a day  -- Indication: For Hypertension     mirtazapine 15 mg oral tablet  -- 1 tab(s) by mouth once a day (at bedtime)  -- Indication: For Dementia without behavioral disturbance, unspecified dementia type    Atarax 10 mg oral tablet  -- 10 milligram(s) by mouth 1 to 3 times a day  -- Indication: For Dementia without behavioral disturbance, unspecified dementia type    latanoprost 0.005% ophthalmic solution  -- 1 drop(s) to each affected eye once a day (in the evening)both  eyes  -- Indication: For Glaucoma     pantoprazole 40 mg oral delayed release tablet  -- 1 tab(s) by mouth once a day  -- Indication: For GERD     Daily Anh oral tablet  -- 1 tab(s) by mouth once a day  -- Indication: For Preventive measure

## 2018-03-26 NOTE — DISCHARGE NOTE ADULT - OTHER SIGNIFICANT FINDINGS
< from: CT Abdomen and Pelvis No Cont (03.20.18 @ 00:26) >  IMPRESSION:    1. No bowel obstruction or inflammation. Moderate amount of stool in the   rectum.  2. Branching "tree in bud" opacities in the right middle and right lower   lobes likely infectious in etiology.     < end of copied text >  < from: CT Head No Cont (03.20.18 @ 00:25) >  IMPRESSION:   No acute territorial infarct, hemorrhage or mass effect.    < end of copied text >

## 2018-03-29 DIAGNOSIS — Z88.0 ALLERGY STATUS TO PENICILLIN: ICD-10-CM

## 2018-03-29 DIAGNOSIS — Z90.49 ACQUIRED ABSENCE OF OTHER SPECIFIED PARTS OF DIGESTIVE TRACT: ICD-10-CM

## 2018-03-29 DIAGNOSIS — Z79.82 LONG TERM (CURRENT) USE OF ASPIRIN: ICD-10-CM

## 2018-03-29 DIAGNOSIS — N17.9 ACUTE KIDNEY FAILURE, UNSPECIFIED: ICD-10-CM

## 2018-03-29 DIAGNOSIS — F41.9 ANXIETY DISORDER, UNSPECIFIED: ICD-10-CM

## 2018-03-29 DIAGNOSIS — G93.41 METABOLIC ENCEPHALOPATHY: ICD-10-CM

## 2018-03-29 DIAGNOSIS — Z66 DO NOT RESUSCITATE: ICD-10-CM

## 2018-03-29 DIAGNOSIS — J15.6 PNEUMONIA DUE TO OTHER GRAM-NEGATIVE BACTERIA: ICD-10-CM

## 2018-03-29 DIAGNOSIS — A41.9 SEPSIS, UNSPECIFIED ORGANISM: ICD-10-CM

## 2018-03-29 DIAGNOSIS — H35.30 UNSPECIFIED MACULAR DEGENERATION: ICD-10-CM

## 2018-03-29 DIAGNOSIS — M19.90 UNSPECIFIED OSTEOARTHRITIS, UNSPECIFIED SITE: ICD-10-CM

## 2018-03-29 DIAGNOSIS — F03.90 UNSPECIFIED DEMENTIA WITHOUT BEHAVIORAL DISTURBANCE: ICD-10-CM

## 2018-03-29 DIAGNOSIS — I11.0 HYPERTENSIVE HEART DISEASE WITH HEART FAILURE: ICD-10-CM

## 2018-03-29 DIAGNOSIS — E78.2 MIXED HYPERLIPIDEMIA: ICD-10-CM

## 2018-03-29 DIAGNOSIS — I25.10 ATHEROSCLEROTIC HEART DISEASE OF NATIVE CORONARY ARTERY WITHOUT ANGINA PECTORIS: ICD-10-CM

## 2018-03-29 DIAGNOSIS — H40.9 UNSPECIFIED GLAUCOMA: ICD-10-CM

## 2018-03-29 DIAGNOSIS — I44.7 LEFT BUNDLE-BRANCH BLOCK, UNSPECIFIED: ICD-10-CM

## 2018-03-29 DIAGNOSIS — E44.0 MODERATE PROTEIN-CALORIE MALNUTRITION: ICD-10-CM

## 2018-03-29 DIAGNOSIS — R19.7 DIARRHEA, UNSPECIFIED: ICD-10-CM

## 2018-03-29 DIAGNOSIS — N30.00 ACUTE CYSTITIS WITHOUT HEMATURIA: ICD-10-CM

## 2018-03-29 DIAGNOSIS — E87.5 HYPERKALEMIA: ICD-10-CM

## 2018-03-29 DIAGNOSIS — Z90.710 ACQUIRED ABSENCE OF BOTH CERVIX AND UTERUS: ICD-10-CM

## 2018-03-29 DIAGNOSIS — I25.2 OLD MYOCARDIAL INFARCTION: ICD-10-CM

## 2018-03-29 DIAGNOSIS — K21.9 GASTRO-ESOPHAGEAL REFLUX DISEASE WITHOUT ESOPHAGITIS: ICD-10-CM

## 2018-03-29 DIAGNOSIS — J43.9 EMPHYSEMA, UNSPECIFIED: ICD-10-CM

## 2018-03-29 DIAGNOSIS — I50.9 HEART FAILURE, UNSPECIFIED: ICD-10-CM

## 2018-03-29 DIAGNOSIS — R13.10 DYSPHAGIA, UNSPECIFIED: ICD-10-CM

## 2018-03-29 DIAGNOSIS — J42 UNSPECIFIED CHRONIC BRONCHITIS: ICD-10-CM

## 2018-12-14 ENCOUNTER — INPATIENT (INPATIENT)
Facility: HOSPITAL | Age: 83
LOS: 4 days | Discharge: NURSING, REHAB OR LONG-TERM CARE | End: 2018-12-19
Attending: HOSPITALIST | Admitting: HOSPITALIST
Payer: MEDICARE

## 2018-12-14 VITALS
OXYGEN SATURATION: 89 % | DIASTOLIC BLOOD PRESSURE: 52 MMHG | HEART RATE: 80 BPM | HEIGHT: 62 IN | WEIGHT: 100.09 LBS | RESPIRATION RATE: 18 BRPM | SYSTOLIC BLOOD PRESSURE: 110 MMHG | TEMPERATURE: 99 F

## 2018-12-14 PROCEDURE — 99285 EMERGENCY DEPT VISIT HI MDM: CPT

## 2018-12-14 PROCEDURE — 71045 X-RAY EXAM CHEST 1 VIEW: CPT | Mod: 26

## 2018-12-14 RX ORDER — SODIUM CHLORIDE 9 MG/ML
1000 INJECTION INTRAMUSCULAR; INTRAVENOUS; SUBCUTANEOUS ONCE
Qty: 0 | Refills: 0 | Status: COMPLETED | OUTPATIENT
Start: 2018-12-14 | End: 2018-12-14

## 2018-12-14 RX ADMIN — SODIUM CHLORIDE 1000 MILLILITER(S): 9 INJECTION INTRAMUSCULAR; INTRAVENOUS; SUBCUTANEOUS at 23:52

## 2018-12-14 NOTE — ED ADULT TRIAGE NOTE - HEART RATE (BEATS/MIN)
Pt verbalizes understanding of results and md instructions.  Pt declining to schedule at this time.   80

## 2018-12-14 NOTE — ED PROVIDER NOTE - PHYSICAL EXAMINATION
Gen: Alert, NAD, frail, laying on side  Head: NC, AT, PERRL, EOMI, normal lids/conjunctiva  ENT: dry mucus membranes  Neck: no JVD, +Trachea midline  Pulm: Bilateral BS, normal resp effort, no wheeze/stridor/retractions  CV: RRR, no M/R/G, +dist pulses  Abd: soft, NT/ND, +BS, no palpable masses  Mskel: no edema/erythema/cyanosis, ecchymoses on both LE's  Skin: no rash, warm/dry  Neuro: No apparent sensory/motor deficits, coordination intact

## 2018-12-14 NOTE — ED PROVIDER NOTE - MEDICAL DECISION MAKING DETAILS
Patient brought from [place] for cough and respiratory distress.  Hypoxic in triage.  No leg swelling to suggest DVT.  Patient receiving antibiotics for cough.  CXR without focal consolidation, has calcified aortic knob.  Patient is to be admitted to the hospital and the case was discussed with the admitting physician.  Any changes in plan, additional imaging/labs, and further work up will be at the discretion of the admitting physician.

## 2018-12-15 DIAGNOSIS — J15.9 UNSPECIFIED BACTERIAL PNEUMONIA: ICD-10-CM

## 2018-12-15 PROBLEM — F03.90 UNSPECIFIED DEMENTIA WITHOUT BEHAVIORAL DISTURBANCE: Chronic | Status: ACTIVE | Noted: 2017-08-21

## 2018-12-15 LAB
ALBUMIN SERPL ELPH-MCNC: 2.7 G/DL — LOW (ref 3.3–5)
ALP SERPL-CCNC: 196 U/L — HIGH (ref 40–120)
ALT FLD-CCNC: 116 U/L — HIGH (ref 12–78)
ANION GAP SERPL CALC-SCNC: 10 MMOL/L — SIGNIFICANT CHANGE UP (ref 5–17)
ANION GAP SERPL CALC-SCNC: 7 MMOL/L — SIGNIFICANT CHANGE UP (ref 5–17)
APPEARANCE UR: CLEAR — SIGNIFICANT CHANGE UP
APTT BLD: 27.8 SEC — LOW (ref 28.5–37)
AST SERPL-CCNC: 128 U/L — HIGH (ref 15–37)
BASOPHILS # BLD AUTO: 0.02 K/UL — SIGNIFICANT CHANGE UP (ref 0–0.2)
BASOPHILS NFR BLD AUTO: 0.4 % — SIGNIFICANT CHANGE UP (ref 0–2)
BILIRUB SERPL-MCNC: 0.2 MG/DL — SIGNIFICANT CHANGE UP (ref 0.2–1.2)
BILIRUB UR-MCNC: NEGATIVE — SIGNIFICANT CHANGE UP
BUN SERPL-MCNC: 32 MG/DL — HIGH (ref 7–23)
BUN SERPL-MCNC: 39 MG/DL — HIGH (ref 7–23)
CALCIUM SERPL-MCNC: 8.5 MG/DL — SIGNIFICANT CHANGE UP (ref 8.5–10.1)
CALCIUM SERPL-MCNC: 8.6 MG/DL — SIGNIFICANT CHANGE UP (ref 8.5–10.1)
CHLORIDE SERPL-SCNC: 107 MMOL/L — SIGNIFICANT CHANGE UP (ref 96–108)
CHLORIDE SERPL-SCNC: 108 MMOL/L — SIGNIFICANT CHANGE UP (ref 96–108)
CO2 SERPL-SCNC: 23 MMOL/L — SIGNIFICANT CHANGE UP (ref 22–31)
CO2 SERPL-SCNC: 26 MMOL/L — SIGNIFICANT CHANGE UP (ref 22–31)
COLOR SPEC: YELLOW — SIGNIFICANT CHANGE UP
CREAT SERPL-MCNC: 1.08 MG/DL — SIGNIFICANT CHANGE UP (ref 0.5–1.3)
CREAT SERPL-MCNC: 1.27 MG/DL — SIGNIFICANT CHANGE UP (ref 0.5–1.3)
CRP SERPL-MCNC: 5.47 MG/DL — HIGH (ref 0–0.4)
D DIMER BLD IA.RAPID-MCNC: 1926 NG/ML DDU — HIGH
DIFF PNL FLD: ABNORMAL
EOSINOPHIL # BLD AUTO: 0.02 K/UL — SIGNIFICANT CHANGE UP (ref 0–0.5)
EOSINOPHIL NFR BLD AUTO: 0.4 % — SIGNIFICANT CHANGE UP (ref 0–6)
EPI CELLS # UR: SIGNIFICANT CHANGE UP
FLUAV SPEC QL CULT: NEGATIVE — SIGNIFICANT CHANGE UP
FLUBV AG SPEC QL IA: NEGATIVE — SIGNIFICANT CHANGE UP
GLUCOSE SERPL-MCNC: 110 MG/DL — HIGH (ref 70–99)
GLUCOSE SERPL-MCNC: 158 MG/DL — HIGH (ref 70–99)
GLUCOSE UR QL: NEGATIVE MG/DL — SIGNIFICANT CHANGE UP
HCT VFR BLD CALC: 37.5 % — SIGNIFICANT CHANGE UP (ref 34.5–45)
HCT VFR BLD CALC: 39.1 % — SIGNIFICANT CHANGE UP (ref 34.5–45)
HGB BLD-MCNC: 12.4 G/DL — SIGNIFICANT CHANGE UP (ref 11.5–15.5)
HGB BLD-MCNC: 12.6 G/DL — SIGNIFICANT CHANGE UP (ref 11.5–15.5)
IMM GRANULOCYTES NFR BLD AUTO: 0.2 % — SIGNIFICANT CHANGE UP (ref 0–1.5)
INR BLD: 0.89 RATIO — SIGNIFICANT CHANGE UP (ref 0.88–1.16)
KETONES UR-MCNC: NEGATIVE — SIGNIFICANT CHANGE UP
LACTATE SERPL-SCNC: 1.9 MMOL/L — SIGNIFICANT CHANGE UP (ref 0.7–2)
LACTATE SERPL-SCNC: 2.3 MMOL/L — HIGH (ref 0.7–2)
LACTATE SERPL-SCNC: 2.5 MMOL/L — HIGH (ref 0.7–2)
LEUKOCYTE ESTERASE UR-ACNC: NEGATIVE — SIGNIFICANT CHANGE UP
LYMPHOCYTES # BLD AUTO: 1.37 K/UL — SIGNIFICANT CHANGE UP (ref 1–3.3)
LYMPHOCYTES # BLD AUTO: 28.2 % — SIGNIFICANT CHANGE UP (ref 13–44)
MCHC RBC-ENTMCNC: 30.2 PG — SIGNIFICANT CHANGE UP (ref 27–34)
MCHC RBC-ENTMCNC: 30.4 PG — SIGNIFICANT CHANGE UP (ref 27–34)
MCHC RBC-ENTMCNC: 32.2 GM/DL — SIGNIFICANT CHANGE UP (ref 32–36)
MCHC RBC-ENTMCNC: 33.1 GM/DL — SIGNIFICANT CHANGE UP (ref 32–36)
MCV RBC AUTO: 91.2 FL — SIGNIFICANT CHANGE UP (ref 80–100)
MCV RBC AUTO: 94.2 FL — SIGNIFICANT CHANGE UP (ref 80–100)
MONOCYTES # BLD AUTO: 0.34 K/UL — SIGNIFICANT CHANGE UP (ref 0–0.9)
MONOCYTES NFR BLD AUTO: 7 % — SIGNIFICANT CHANGE UP (ref 2–14)
NEUTROPHILS # BLD AUTO: 3.09 K/UL — SIGNIFICANT CHANGE UP (ref 1.8–7.4)
NEUTROPHILS NFR BLD AUTO: 63.8 % — SIGNIFICANT CHANGE UP (ref 43–77)
NITRITE UR-MCNC: NEGATIVE — SIGNIFICANT CHANGE UP
NRBC # BLD: 0 /100 WBCS — SIGNIFICANT CHANGE UP (ref 0–0)
NRBC # BLD: 0 /100 WBCS — SIGNIFICANT CHANGE UP (ref 0–0)
NT-PROBNP SERPL-SCNC: 1984 PG/ML — HIGH (ref 0–450)
PH UR: 6 — SIGNIFICANT CHANGE UP (ref 5–8)
PLATELET # BLD AUTO: 103 K/UL — LOW (ref 150–400)
PLATELET # BLD AUTO: 114 K/UL — LOW (ref 150–400)
POTASSIUM SERPL-MCNC: 4.1 MMOL/L — SIGNIFICANT CHANGE UP (ref 3.5–5.3)
POTASSIUM SERPL-MCNC: 4.4 MMOL/L — SIGNIFICANT CHANGE UP (ref 3.5–5.3)
POTASSIUM SERPL-SCNC: 4.1 MMOL/L — SIGNIFICANT CHANGE UP (ref 3.5–5.3)
POTASSIUM SERPL-SCNC: 4.4 MMOL/L — SIGNIFICANT CHANGE UP (ref 3.5–5.3)
PROT SERPL-MCNC: 6.5 GM/DL — SIGNIFICANT CHANGE UP (ref 6–8.3)
PROT UR-MCNC: 100 MG/DL
PROTHROM AB SERPL-ACNC: 9.9 SEC — LOW (ref 10–12.9)
RBC # BLD: 4.11 M/UL — SIGNIFICANT CHANGE UP (ref 3.8–5.2)
RBC # BLD: 4.15 M/UL — SIGNIFICANT CHANGE UP (ref 3.8–5.2)
RBC # FLD: 13.2 % — SIGNIFICANT CHANGE UP (ref 10.3–14.5)
RBC # FLD: 13.5 % — SIGNIFICANT CHANGE UP (ref 10.3–14.5)
SODIUM SERPL-SCNC: 140 MMOL/L — SIGNIFICANT CHANGE UP (ref 135–145)
SODIUM SERPL-SCNC: 141 MMOL/L — SIGNIFICANT CHANGE UP (ref 135–145)
SP GR SPEC: 1.01 — SIGNIFICANT CHANGE UP (ref 1.01–1.02)
TROPONIN I SERPL-MCNC: 0.04 NG/ML — SIGNIFICANT CHANGE UP (ref 0.01–0.04)
UROBILINOGEN FLD QL: NEGATIVE MG/DL — SIGNIFICANT CHANGE UP
WBC # BLD: 4.85 K/UL — SIGNIFICANT CHANGE UP (ref 3.8–10.5)
WBC # BLD: 7.54 K/UL — SIGNIFICANT CHANGE UP (ref 3.8–10.5)
WBC # FLD AUTO: 4.85 K/UL — SIGNIFICANT CHANGE UP (ref 3.8–10.5)
WBC # FLD AUTO: 7.54 K/UL — SIGNIFICANT CHANGE UP (ref 3.8–10.5)

## 2018-12-15 PROCEDURE — 93010 ELECTROCARDIOGRAM REPORT: CPT

## 2018-12-15 PROCEDURE — 99223 1ST HOSP IP/OBS HIGH 75: CPT | Mod: AI

## 2018-12-15 PROCEDURE — 12345: CPT | Mod: NC

## 2018-12-15 RX ORDER — ENOXAPARIN SODIUM 100 MG/ML
30 INJECTION SUBCUTANEOUS EVERY 24 HOURS
Qty: 0 | Refills: 0 | Status: DISCONTINUED | OUTPATIENT
Start: 2018-12-15 | End: 2018-12-19

## 2018-12-15 RX ORDER — DEXTROSE 50 % IN WATER 50 %
25 SYRINGE (ML) INTRAVENOUS ONCE
Qty: 0 | Refills: 0 | Status: DISCONTINUED | OUTPATIENT
Start: 2018-12-15 | End: 2018-12-19

## 2018-12-15 RX ORDER — ACETAMINOPHEN 500 MG
650 TABLET ORAL ONCE
Qty: 0 | Refills: 0 | Status: COMPLETED | OUTPATIENT
Start: 2018-12-15 | End: 2018-12-15

## 2018-12-15 RX ORDER — LATANOPROST 0.05 MG/ML
1 SOLUTION/ DROPS OPHTHALMIC; TOPICAL AT BEDTIME
Qty: 0 | Refills: 0 | Status: DISCONTINUED | OUTPATIENT
Start: 2018-12-15 | End: 2018-12-19

## 2018-12-15 RX ORDER — DEXTROSE 50 % IN WATER 50 %
12.5 SYRINGE (ML) INTRAVENOUS ONCE
Qty: 0 | Refills: 0 | Status: DISCONTINUED | OUTPATIENT
Start: 2018-12-15 | End: 2018-12-19

## 2018-12-15 RX ORDER — GLUCAGON INJECTION, SOLUTION 0.5 MG/.1ML
1 INJECTION, SOLUTION SUBCUTANEOUS ONCE
Qty: 0 | Refills: 0 | Status: DISCONTINUED | OUTPATIENT
Start: 2018-12-15 | End: 2018-12-19

## 2018-12-15 RX ORDER — DEXTROSE 50 % IN WATER 50 %
15 SYRINGE (ML) INTRAVENOUS ONCE
Qty: 0 | Refills: 0 | Status: DISCONTINUED | OUTPATIENT
Start: 2018-12-15 | End: 2018-12-19

## 2018-12-15 RX ORDER — VANCOMYCIN HCL 1 G
1000 VIAL (EA) INTRAVENOUS ONCE
Qty: 0 | Refills: 0 | Status: COMPLETED | OUTPATIENT
Start: 2018-12-15 | End: 2018-12-15

## 2018-12-15 RX ORDER — PANTOPRAZOLE SODIUM 20 MG/1
40 TABLET, DELAYED RELEASE ORAL
Qty: 0 | Refills: 0 | Status: DISCONTINUED | OUTPATIENT
Start: 2018-12-15 | End: 2018-12-16

## 2018-12-15 RX ORDER — IPRATROPIUM/ALBUTEROL SULFATE 18-103MCG
3 AEROSOL WITH ADAPTER (GRAM) INHALATION EVERY 6 HOURS
Qty: 0 | Refills: 0 | Status: DISCONTINUED | OUTPATIENT
Start: 2018-12-15 | End: 2018-12-19

## 2018-12-15 RX ORDER — MIRTAZAPINE 45 MG/1
15 TABLET, ORALLY DISINTEGRATING ORAL AT BEDTIME
Qty: 0 | Refills: 0 | Status: DISCONTINUED | OUTPATIENT
Start: 2018-12-15 | End: 2018-12-19

## 2018-12-15 RX ORDER — ASPIRIN/CALCIUM CARB/MAGNESIUM 324 MG
81 TABLET ORAL DAILY
Qty: 0 | Refills: 0 | Status: DISCONTINUED | OUTPATIENT
Start: 2018-12-15 | End: 2018-12-19

## 2018-12-15 RX ORDER — SODIUM CHLORIDE 9 MG/ML
1000 INJECTION, SOLUTION INTRAVENOUS
Qty: 0 | Refills: 0 | Status: DISCONTINUED | OUTPATIENT
Start: 2018-12-15 | End: 2018-12-19

## 2018-12-15 RX ADMIN — Medication 3 MILLILITER(S): at 17:04

## 2018-12-15 RX ADMIN — Medication 650 MILLIGRAM(S): at 07:26

## 2018-12-15 RX ADMIN — Medication 3 MILLILITER(S): at 23:25

## 2018-12-15 RX ADMIN — Medication 1 TABLET(S): at 11:07

## 2018-12-15 RX ADMIN — LATANOPROST 1 DROP(S): 0.05 SOLUTION/ DROPS OPHTHALMIC; TOPICAL at 22:03

## 2018-12-15 RX ADMIN — Medication 650 MILLIGRAM(S): at 10:57

## 2018-12-15 RX ADMIN — MIRTAZAPINE 15 MILLIGRAM(S): 45 TABLET, ORALLY DISINTEGRATING ORAL at 21:24

## 2018-12-15 RX ADMIN — Medication 250 MILLIGRAM(S): at 05:22

## 2018-12-15 RX ADMIN — ENOXAPARIN SODIUM 30 MILLIGRAM(S): 100 INJECTION SUBCUTANEOUS at 11:07

## 2018-12-15 RX ADMIN — PANTOPRAZOLE SODIUM 40 MILLIGRAM(S): 20 TABLET, DELAYED RELEASE ORAL at 11:07

## 2018-12-15 RX ADMIN — Medication 81 MILLIGRAM(S): at 11:08

## 2018-12-15 RX ADMIN — Medication 3 MILLILITER(S): at 11:24

## 2018-12-15 NOTE — CONSULT NOTE ADULT - SUBJECTIVE AND OBJECTIVE BOX
Patient is a 89y old  Female who presents with a chief complaint of respiratory distress (15 Dec 2018 12:45)    HPI:  89 y/o female w/advanced Dementia, Anxiety, lower back pain,CAD, CHF, LBBB,  Glaucoma, DM2 HTN, OA and Macular degeneration sent from NH for respiratory distress and desaturating into mid 80's.  Pt was reported to have non productive cough for 3-4 days and low grade temps and sent in for evaluation.  pt unable to contribute.   In hospital with temperature of 102 and hypoxia. not able to provide history.    PAST MEDICAL & SURGICAL HISTORY:  Dementia  Glaucoma: bilateral on medication  Age Related Macular Degeneration:   Fracture Lumbar Vertebra-Closed: 2009  Osteoarthritis  Diabetes Mellitus Type II  Chronic Bronchitis with Emphysema:   Hyperlipidemia:   Congestive Heart Failure: ,   Myocardial Infarct, Old:   History of Tonsillectomy: child  Ovarian Cyst: excision ovarian cyst &#[id number]  History of Hysterectomy:   Colon Obstruction:  colon resection , benign    FAMILY HISTORY:  Family history unknown: pt unable to contribute    SOCIAL HISTORY: BMI (kg/m2): 18.8 . non smoker per family    Allergies  PC Pen VK (Swelling)  penicillin (Unknown)    MEDICATIONS  (STANDING):  ALBUTerol/ipratropium for Nebulization 3 milliLiter(s) Nebulizer every 6 hours  aspirin  chewable 81 milliGRAM(s) Oral daily  dextrose 5%. 1000 milliLiter(s) (50 mL/Hr) IV Continuous <Continuous>  dextrose 50% Injectable 12.5 Gram(s) IV Push once  dextrose 50% Injectable 25 Gram(s) IV Push once  dextrose 50% Injectable 25 Gram(s) IV Push once  enalapril 20 milliGRAM(s) Oral daily  enoxaparin Injectable 30 milliGRAM(s) SubCutaneous every 24 hours  latanoprost 0.005% Ophthalmic Solution 1 Drop(s) Both EYES at bedtime  mirtazapine 15 milliGRAM(s) Oral at bedtime  multivitamin 1 Tablet(s) Oral daily  pantoprazole    Tablet 40 milliGRAM(s) Oral before breakfast    MEDICATIONS  (PRN):  dextrose 40% Gel 15 Gram(s) Oral once PRN Blood Glucose LESS THAN 70 milliGRAM(s)/deciliter  glucagon  Injectable 1 milliGRAM(s) IntraMuscular once PRN Glucose LESS THAN 70 milligrams/deciliter    REVIEW OF SYSTEMS:  not able to provide.    MACRA & MIPS: per family  Vaccines - Influenza: yes and Pneumovax: yes  Tobacco:  no  Blood Pressure Screening / Control of: 129/62  Current Medications Reviewed: yes    Vital Signs Last 24 Hrs  T(C): 38.1 (15 Dec 2018 10:36), Max: 38.9 (15 Dec 2018 06:34)  T(F): 100.5 (15 Dec 2018 10:36), Max: 102 (15 Dec 2018 06:34)  HR: 110 (15 Dec 2018 12:02) (80 - 115)  BP: 120/61 (15 Dec 2018 10:36) (102/41 - 129/62)  BP(mean): --  RR: 18 (15 Dec 2018 10:36) (18 - 20)  SpO2: 94% (15 Dec 2018 12:02) (89% - 95%)    PHYSICAL EXAM:  GEN:         Awake, responsive and comfortable.  HEENT:    Normal.    RESP:       decreased air entry.  CVS:          Regular rate and rhythm.   ABD:         Soft, non-tender, non-distended;   SKIN:           Warm and dry.  EXTR:          contracted.  CNS:           responsive, confused.  PSYCH:       not able to cooperate.    LABS:                        12.4   7.54  )-----------( 103      ( 15 Dec 2018 08:28 )             37.5     12-15    141  |  108  |  32<H>  ----------------------------<  110<H>  4.4   |  23  |  1.08    Ca    8.6      15 Dec 2018 08:28    TPro  6.5  /  Alb  2.7<L>  /  TBili  0.2  /  DBili  x   /  AST  128<H>  /  ALT  116<H>  /  AlkPhos  196<H>  12-    PT/INR - ( 14 Dec 2018 23:59 )   PT: 9.9 sec;   INR: 0.89 ratio      PTT - ( 14 Dec 2018 23:59 )  PTT:27.8 sec    Urinalysis Basic - ( 15 Dec 2018 05:57 )    Color: Yellow / Appearance: Clear / S.010 / pH: x  Gluc: x / Ketone: Negative  / Bili: Negative / Urobili: Negative mg/dL   Blood: x / Protein: 100 mg/dL / Nitrite: Negative   Leuk Esterase: Negative / RBC: x / WBC x   Sq Epi: x / Non Sq Epi: Occasional / Bacteria: x    EKG: Sinus tachycardia, LBBB    RADIOLOGY & ADDITIONAL STUDIES:  < from: Xray Chest 1 View-PORTABLE IMMEDIATE (18 @ 22:51) >    EXAM:  XR CHEST PORTABLE IMMED 1V                          PROCEDURE DATE:  2018      INTERPRETATION:  History: Sepsis    Portable chest radiograph is compared to 3/19/2018.    The heart is not enlarged. There is atherosclerotic calcification of   aorta. There is no focal infiltrate or pleural fluid. There is osteopenia   of the bony structures.    Impression: No active pulmonary disease. No change.    JENNIFER DOTSON M.D.,ATTENDING RADIOLOGIST  This document has been electronically signed. Dec 15 2018  9:33AM      ASSESSMENT AND PLAN:  ·	Acute Respiratory distress.  ·	Hypoxia.  ·	Tracheobronchitis.  ·	Acute kidney Injury.  ·	CAD history.  ·	CHF history.  ·	HTN.  ·	DM  ·	Dementia.    Continue O2, nebulizer.  Influenza Screening.  Suction PRN.  Procalcitonin level.  DVT prophylaxis.

## 2018-12-15 NOTE — H&P ADULT - PROBLEM SELECTOR PLAN 1
admit to medicine  blood cultures  unable to appreciate completely on cxr as pt also w/affective dehydration  vanco/levaquin  pulm consult

## 2018-12-15 NOTE — H&P ADULT - HISTORY OF PRESENT ILLNESS
Pt is a 89 y/o female w/advanced dementia, Anxiety, lower back pain,cad, chf, LBBB,  glaucoma, DM2 HTN, CAD, OA and macular degeneration sent from NH for respiratory distress and desating into mid 80's.  Pt was reported to have non productive cough for 3-4 days and low grade temps and sent in for evaluation.  pt unable to contribute. Pt is a 89 y/o female w/advanced dementia, Anxiety, lower back pain,cad, chf, LBBB,  glaucoma, DM2 HTN, CAD, OA and macular degeneration sent from NH for respiratory distress and desating into mid 80's.  Pt was reported to have non productive cough for 3-4 days and low grade temps and sent in for evaluation.  pt unable to contribute.

## 2018-12-15 NOTE — PROGRESS NOTE ADULT - SUBJECTIVE AND OBJECTIVE BOX
Pt is a 89 y/o female w/ advanced dementia, Anxiety, lower back pain, CAD, CHF, LBBB,  glaucoma, DM2, HTN, OA and macular degeneration sent from NH for respiratory distress and desating into mid 80's.  Pt was reported to have non productive cough for 3-4 days and low grade temps and sent in for evaluation.  pt unable to contribute.      Chronic Bronchitis with Emphysema  2009  Diabetes Mellitus Type II    Fracture Lumbar Vertebra-Closed  8/2009  Glaucoma  bilateral on medication  Hyperlipidemia  1997  Myocardial Infarct, Old  1997  Osteoarthritis.     89 y/o female w/advanced dementia, Anxiety, lower back pain,cad, chf, LBBB,  glaucoma, DM2 HTN, CAD, OA and macular degeneration sent from NH for respiratory distress    Problem/Plan - 1:  ·  Problem: Pneumonia, bacterial.  Plan: admit to medicine  blood cultures  unable to appreciate completely on cxr as pt also w/affective dehydration  vanco/levaquin  pulm consult.     Problem/Plan - 2:  ·  Problem: Dementia without behavioral disturbance, unspecified dementia type.  Plan: supportive care.     Problem/Plan - 3:  ·  Problem: WADE (acute kidney injury).  Plan: iv hydration and monitor.     Problem/Plan - 4:  ·  Problem: Osteoarthritis, unspecified osteoarthritis type, unspecified site.  Plan: tylenol prn.     Problem/Plan - 5:  ·  Problem: Type 2 diabetes mellitus with hyperglycemia, without long-term current use of insulin.  Plan: ss insulin coverage.     Problem/Plan - 6:  Problem: Mixed hyperlipidemia. Plan: pt not on any statin  has elevated lfts chronic, ?weller  check abdominal us.    Problem/Plan - 7:  ·  Problem: Chronic congestive heart failure, unspecified congestive heart failure type.  Plan: monitor.     Problem/Plan - 8:  ·  Problem: Chronic Bronchitis with Emphysema.  Plan: duo neub.     Problem/Plan - 9:  ·  Problem: Preventive measure.  Plan: sq heparin. Pt is a 89 y/o female w/ advanced dementia, Anxiety, lower back pain, CAD status post MI, CKD II-III, CHF, LBBB, glaucoma, DM2, HTN, HLD, OA, COPD with Emphysema and macular degeneration who p/w acute hypoxic respiratory failure from possible pneumonia, though radiology did not see PNA on CXR. Blood cultures pending, will repeat lactate & check procalcitonin. Pulm consulted. Pt is a 89 y/o female w/ advanced dementia, Anxiety, lower back pain, CAD status post MI, CKD II-III, CHF, LBBB, glaucoma, DM2, HTN, HLD, OA, COPD with Emphysema and macular degeneration who p/w acute hypoxic respiratory failure from possible pneumonia, though radiology did not see PNA on CXR. Will c/w prophylactic Levaquin for now. Blood cultures pending, will repeat lactate & check procalcitonin & viral panel. Pulm consulted.

## 2018-12-15 NOTE — H&P ADULT - NSHPLABSRESULTS_GEN_ALL_CORE
LABS:                        12.6   4.85  )-----------( 114      ( 14 Dec 2018 23:59 )             39.1         140  |  107  |  39<H>  ----------------------------<  158<H>  4.1   |  26  |  1.27    Ca    8.5      14 Dec 2018 23:59    TPro  6.5  /  Alb  2.7<L>  /  TBili  0.2  /  DBili  x   /  AST  128<H>  /  ALT  116<H>  /  AlkPhos  196<H>      PT/INR - ( 14 Dec 2018 23:59 )   PT: 9.9 sec;   INR: 0.89 ratio         PTT - ( 14 Dec 2018 23:59 )  PTT:27.8 sec  Urinalysis Basic - ( 15 Dec 2018 05:57 )    Color: Yellow / Appearance: Clear / S.010 / pH: x  Gluc: x / Ketone: Negative  / Bili: Negative / Urobili: Negative mg/dL   Blood: x / Protein: 100 mg/dL / Nitrite: Negative   Leuk Esterase: Negative / RBC: x / WBC x   Sq Epi: x / Non Sq Epi: Occasional / Bacteria: x      CAPILLARY BLOOD GLUCOSE          RADIOLOGY & ADDITIONAL TESTS:    Imaging Personally Reviewed:  [ X] YES  [ ] NO

## 2018-12-15 NOTE — H&P ADULT - NSHPPHYSICALEXAM_GEN_ALL_CORE
Vital Signs Last 24 Hrs  T(C): 37.4 (14 Dec 2018 23:12), Max: 37.4 (14 Dec 2018 20:28)  T(F): 99.4 (14 Dec 2018 23:12), Max: 99.4 (14 Dec 2018 20:28)  HR: 110 (15 Dec 2018 03:04) (80 - 110)  BP: 129/62 (15 Dec 2018 03:04) (110/52 - 129/62)  BP(mean): --  RR: 20 (15 Dec 2018 03:04) (18 - 20)  SpO2: 95% (15 Dec 2018 03:04) (89% - 95%)  PHYSICAL EXAM:    GENERAL: elderly female,ill appearing  HEAD:  Atraumatic, Normocephalic  EYES: EOMI, PERRLA, conjunctiva and sclera clear  ENMT: No tonsillar erythema, exudates, or enlargement; dry mucous membranes, No lesions  NECK: Supple, No JVD, Normal thyroid  NERVOUS SYSTEM:  Alert & Oriented X1, Motor Strength 5/5 B/L upper and lower extremities;  CHEST/LUNG:cta b/l   HEART: Regular rate and rhythm; No rubs, or gallops, +S1,S2  ABDOMEN: Soft, Nontender, Nondistended; Bowel sounds present, old surgical scar, reducible ventral hernia  EXTREMITIES:  2+ Peripheral Pulses, No clubbing, cyanosis, or edema  LYMPH: No cervical adenopathy  RECTAL: deferred  BREAST: deferred  : deferred  SKIN: No rashes or lesions    IMPROVE VTE Individual Risk Assessment          RISK                                                          Points  [  ] Previous VTE                                                3  [  ] Thrombophilia                                             2  [  ] Lower limb paralysis                                    2        (unable to hold up >15 seconds)    [  ] Current Cancer                                             2         (within 6 months)  [ x ] Immobilization > 24 hrs                              1  [  ] ICU/CCU stay > 24 hours                            1  [  x] Age > 60                                                    1  IMPROVE VTE Score ___2______

## 2018-12-15 NOTE — H&P ADULT - ASSESSMENT
89 y/o female w/advanced dementia, Anxiety, lower back pain,cad, chf, LBBB,  glaucoma, DM2 HTN, CAD, OA and macular degeneration sent from NH for respiratory distress

## 2018-12-16 LAB
ANION GAP SERPL CALC-SCNC: 11 MMOL/L — SIGNIFICANT CHANGE UP (ref 5–17)
BUN SERPL-MCNC: 26 MG/DL — HIGH (ref 7–23)
CALCIUM SERPL-MCNC: 8.7 MG/DL — SIGNIFICANT CHANGE UP (ref 8.5–10.1)
CHLORIDE SERPL-SCNC: 109 MMOL/L — HIGH (ref 96–108)
CO2 SERPL-SCNC: 23 MMOL/L — SIGNIFICANT CHANGE UP (ref 22–31)
CREAT SERPL-MCNC: 1.1 MG/DL — SIGNIFICANT CHANGE UP (ref 0.5–1.3)
CULTURE RESULTS: NO GROWTH — SIGNIFICANT CHANGE UP
GLUCOSE SERPL-MCNC: 156 MG/DL — HIGH (ref 70–99)
HBA1C BLD-MCNC: 6 % — HIGH (ref 4–5.6)
HCT VFR BLD CALC: 38.6 % — SIGNIFICANT CHANGE UP (ref 34.5–45)
HGB BLD-MCNC: 13 G/DL — SIGNIFICANT CHANGE UP (ref 11.5–15.5)
MAGNESIUM SERPL-MCNC: 2 MG/DL — SIGNIFICANT CHANGE UP (ref 1.6–2.6)
MCHC RBC-ENTMCNC: 30.4 PG — SIGNIFICANT CHANGE UP (ref 27–34)
MCHC RBC-ENTMCNC: 33.7 GM/DL — SIGNIFICANT CHANGE UP (ref 32–36)
MCV RBC AUTO: 90.4 FL — SIGNIFICANT CHANGE UP (ref 80–100)
NRBC # BLD: 0 /100 WBCS — SIGNIFICANT CHANGE UP (ref 0–0)
PHOSPHATE SERPL-MCNC: 2.7 MG/DL — SIGNIFICANT CHANGE UP (ref 2.5–4.5)
PLATELET # BLD AUTO: 95 K/UL — LOW (ref 150–400)
POTASSIUM SERPL-MCNC: 3.9 MMOL/L — SIGNIFICANT CHANGE UP (ref 3.5–5.3)
POTASSIUM SERPL-SCNC: 3.9 MMOL/L — SIGNIFICANT CHANGE UP (ref 3.5–5.3)
PROCALCITONIN SERPL-MCNC: 0.25 NG/ML — HIGH (ref 0.02–0.1)
RBC # BLD: 4.27 M/UL — SIGNIFICANT CHANGE UP (ref 3.8–5.2)
RBC # FLD: 13.3 % — SIGNIFICANT CHANGE UP (ref 10.3–14.5)
SODIUM SERPL-SCNC: 143 MMOL/L — SIGNIFICANT CHANGE UP (ref 135–145)
SPECIMEN SOURCE: SIGNIFICANT CHANGE UP
WBC # BLD: 6.86 K/UL — SIGNIFICANT CHANGE UP (ref 3.8–10.5)
WBC # FLD AUTO: 6.86 K/UL — SIGNIFICANT CHANGE UP (ref 3.8–10.5)

## 2018-12-16 PROCEDURE — 99233 SBSQ HOSP IP/OBS HIGH 50: CPT

## 2018-12-16 RX ORDER — PANTOPRAZOLE SODIUM 20 MG/1
40 TABLET, DELAYED RELEASE ORAL ONCE
Qty: 0 | Refills: 0 | Status: COMPLETED | OUTPATIENT
Start: 2018-12-16 | End: 2018-12-16

## 2018-12-16 RX ORDER — IBUPROFEN 200 MG
400 TABLET ORAL ONCE
Qty: 0 | Refills: 0 | Status: COMPLETED | OUTPATIENT
Start: 2018-12-16 | End: 2018-12-16

## 2018-12-16 RX ORDER — ACETAMINOPHEN 500 MG
650 TABLET ORAL EVERY 6 HOURS
Qty: 0 | Refills: 0 | Status: DISCONTINUED | OUTPATIENT
Start: 2018-12-16 | End: 2018-12-19

## 2018-12-16 RX ORDER — PANTOPRAZOLE SODIUM 20 MG/1
40 TABLET, DELAYED RELEASE ORAL DAILY
Qty: 0 | Refills: 0 | Status: DISCONTINUED | OUTPATIENT
Start: 2018-12-16 | End: 2018-12-19

## 2018-12-16 RX ORDER — METOPROLOL TARTRATE 50 MG
12.5 TABLET ORAL
Qty: 0 | Refills: 0 | Status: DISCONTINUED | OUTPATIENT
Start: 2018-12-16 | End: 2018-12-19

## 2018-12-16 RX ORDER — INSULIN LISPRO 100/ML
VIAL (ML) SUBCUTANEOUS
Qty: 0 | Refills: 0 | Status: DISCONTINUED | OUTPATIENT
Start: 2018-12-16 | End: 2018-12-19

## 2018-12-16 RX ADMIN — Medication 3 MILLILITER(S): at 23:13

## 2018-12-16 RX ADMIN — Medication 650 MILLIGRAM(S): at 16:02

## 2018-12-16 RX ADMIN — Medication 3 MILLILITER(S): at 17:12

## 2018-12-16 RX ADMIN — Medication 20 MILLIGRAM(S): at 05:49

## 2018-12-16 RX ADMIN — Medication 650 MILLIGRAM(S): at 07:01

## 2018-12-16 RX ADMIN — Medication 650 MILLIGRAM(S): at 05:59

## 2018-12-16 RX ADMIN — Medication 12.5 MILLIGRAM(S): at 18:16

## 2018-12-16 RX ADMIN — Medication 650 MILLIGRAM(S): at 17:05

## 2018-12-16 RX ADMIN — LATANOPROST 1 DROP(S): 0.05 SOLUTION/ DROPS OPHTHALMIC; TOPICAL at 21:14

## 2018-12-16 RX ADMIN — MIRTAZAPINE 15 MILLIGRAM(S): 45 TABLET, ORALLY DISINTEGRATING ORAL at 21:15

## 2018-12-16 RX ADMIN — Medication 400 MILLIGRAM(S): at 12:30

## 2018-12-16 RX ADMIN — PANTOPRAZOLE SODIUM 40 MILLIGRAM(S): 20 TABLET, DELAYED RELEASE ORAL at 06:18

## 2018-12-16 RX ADMIN — Medication 400 MILLIGRAM(S): at 10:35

## 2018-12-16 RX ADMIN — ENOXAPARIN SODIUM 30 MILLIGRAM(S): 100 INJECTION SUBCUTANEOUS at 10:36

## 2018-12-16 RX ADMIN — Medication 3 MILLILITER(S): at 11:28

## 2018-12-16 RX ADMIN — Medication 3 MILLILITER(S): at 05:18

## 2018-12-16 NOTE — PROGRESS NOTE ADULT - SUBJECTIVE AND OBJECTIVE BOX
89 y/o female w/ advanced dementia, Anxiety, lower back pain, CAD status post MI, CKD II-III, CHF, LBBB, glaucoma, DM2, HTN, HLD, OA, COPD with Emphysema and macular degeneration who p/w acute hypoxic respiratory failure and was found to have parainfluenza. She is lying in bed in NAD.    MEDICATIONS  (STANDING):  ALBUTerol/ipratropium for Nebulization 3 milliLiter(s) Nebulizer every 6 hours  aspirin  chewable 81 milliGRAM(s) Oral daily  dextrose 5%. 1000 milliLiter(s) (50 mL/Hr) IV Continuous <Continuous>  dextrose 50% Injectable 12.5 Gram(s) IV Push once  dextrose 50% Injectable 25 Gram(s) IV Push once  dextrose 50% Injectable 25 Gram(s) IV Push once  enalapril 20 milliGRAM(s) Oral daily  enoxaparin Injectable 30 milliGRAM(s) SubCutaneous every 24 hours  latanoprost 0.005% Ophthalmic Solution 1 Drop(s) Both EYES at bedtime  levoFLOXacin  Tablet 750 milliGRAM(s) Oral every 48 hours  mirtazapine 15 milliGRAM(s) Oral at bedtime  multivitamin 1 Tablet(s) Oral daily  pantoprazole    Tablet 40 milliGRAM(s) Oral before breakfast    MEDICATIONS  (PRN):  acetaminophen  Suppository .. 650 milliGRAM(s) Rectal every 6 hours PRN Temp greater or equal to 38C (100.4F), Mild Pain (1 - 3)  dextrose 40% Gel 15 Gram(s) Oral once PRN Blood Glucose LESS THAN 70 milliGRAM(s)/deciliter  glucagon  Injectable 1 milliGRAM(s) IntraMuscular once PRN Glucose LESS THAN 70 milligrams/deciliter      Allergies    PC Pen VK (Swelling)  penicillin (Unknown)    Intolerances        Vital Signs Last 24 Hrs  T(C): 37.9 (16 Dec 2018 12:30), Max: 38.2 (16 Dec 2018 08:16)  T(F): 100.2 (16 Dec 2018 12:30), Max: 100.7 (16 Dec 2018 08:16)  HR: 110 (16 Dec 2018 12:30) (89 - 118)  BP: 112/46 (16 Dec 2018 12:30) (110/55 - 176/82)  BP(mean): --  RR: 19 (16 Dec 2018 12:30) (17 - 19)  SpO2: 98% (16 Dec 2018 12:30) (92% - 98%)    PHYSICAL EXAM:  GENERAL: NAD, well-groomed, well-developed  HEAD:  Atraumatic, Normocephalic  EYES: EOMI, PERRLA, conjunctiva and sclera clear  ENMT: No tonsillar erythema, exudates, or enlargement; Moist mucous membranes, Good dentition, No lesions  NECK: Supple, No JVD, Normal thyroid  NERVOUS SYSTEM:  Alert & Oriented X3, Good concentration; Motor Strength 5/5 B/L upper and lower extremities; DTRs 2+ intact and symmetric  CHEST/LUNG: Clear to auscultation bilaterally; No rales, rhonchi, wheezing, or rubs  HEART: Regular rate and rhythm; No murmurs, rubs, or gallops  ABDOMEN: Soft, Nontender, Nondistended; Bowel sounds present  EXTREMITIES:  2+ Peripheral Pulses, No clubbing, cyanosis, or edema  LYMPH: No lymphadenopathy noted  SKIN: No rashes or lesions    LABS:                        13.0   6.86  )-----------( 95       ( 16 Dec 2018 08:15 )             38.6     12-16    143  |  109<H>  |  26<H>  ----------------------------<  156<H>  3.9   |  23  |  1.10    Ca    8.7      16 Dec 2018 08:15  Phos  2.7     12-16  Mg     2.0     12-16    TPro  6.5  /  Alb  2.7<L>  /  TBili  0.2  /  DBili  x   /  AST  128<H>  /  ALT  116<H>  /  AlkPhos  196<H>  12-14    PT/INR - ( 14 Dec 2018 23:59 )   PT: 9.9 sec;   INR: 0.89 ratio         PTT - ( 14 Dec 2018 23:59 )  PTT:27.8 sec  Urinalysis Basic - ( 15 Dec 2018 05:57 )    Color: Yellow / Appearance: Clear / S.010 / pH: x  Gluc: x / Ketone: Negative  / Bili: Negative / Urobili: Negative mg/dL   Blood: x / Protein: 100 mg/dL / Nitrite: Negative   Leuk Esterase: Negative / RBC: x / WBC x   Sq Epi: x / Non Sq Epi: Occasional / Bacteria: x      CAPILLARY BLOOD GLUCOSE      POCT Blood Glucose.: 137 mg/dL (16 Dec 2018 10:56)  POCT Blood Glucose.: 164 mg/dL (16 Dec 2018 07:54)  POCT Blood Glucose.: 138 mg/dL (15 Dec 2018 21:24)      Culture - Urine (collected 15 Dec 2018 11:23)  Source: .Urine Clean Catch (Midstream)  Final Report (16 Dec 2018 10:58):    No growth    Culture - Blood (collected 15 Dec 2018 10:42)  Source: .Blood Blood-Peripheral  Preliminary Report (16 Dec 2018 11:01):    No growth to date.    Culture - Blood (collected 15 Dec 2018 10:41)  Source: .Blood Blood-Peripheral  Preliminary Report (16 Dec 2018 11:01):    No growth to date.      RADIOLOGY & ADDITIONAL TESTS: 89 y/o female w/ advanced dementia, Anxiety, lower back pain, CAD status post MI, CKD II-III, CHF, LBBB, glaucoma, DM2, HTN, HLD, OA, COPD with Emphysema and macular degeneration who p/w acute hypoxic respiratory failure and was found to have parainfluenza. She is lying in bed in NAD.    MEDICATIONS  (STANDING):  ALBUTerol/ipratropium for Nebulization 3 milliLiter(s) Nebulizer every 6 hours  aspirin  chewable 81 milliGRAM(s) Oral daily  dextrose 5%. 1000 milliLiter(s) (50 mL/Hr) IV Continuous <Continuous>  dextrose 50% Injectable 12.5 Gram(s) IV Push once  dextrose 50% Injectable 25 Gram(s) IV Push once  dextrose 50% Injectable 25 Gram(s) IV Push once  enalapril 20 milliGRAM(s) Oral daily  enoxaparin Injectable 30 milliGRAM(s) SubCutaneous every 24 hours  latanoprost 0.005% Ophthalmic Solution 1 Drop(s) Both EYES at bedtime  levoFLOXacin  Tablet 750 milliGRAM(s) Oral every 48 hours  mirtazapine 15 milliGRAM(s) Oral at bedtime  multivitamin 1 Tablet(s) Oral daily  pantoprazole    Tablet 40 milliGRAM(s) Oral before breakfast    MEDICATIONS  (PRN):  acetaminophen  Suppository .. 650 milliGRAM(s) Rectal every 6 hours PRN Temp greater or equal to 38C (100.4F), Mild Pain (1 - 3)  dextrose 40% Gel 15 Gram(s) Oral once PRN Blood Glucose LESS THAN 70 milliGRAM(s)/deciliter  glucagon  Injectable 1 milliGRAM(s) IntraMuscular once PRN Glucose LESS THAN 70 milligrams/deciliter      Allergies    PC Pen VK (Swelling)  penicillin (Unknown)    Intolerances        Vital Signs Last 24 Hrs  T(C): 37.9 (16 Dec 2018 12:30), Max: 38.2 (16 Dec 2018 08:16)  T(F): 100.2 (16 Dec 2018 12:30), Max: 100.7 (16 Dec 2018 08:16)  HR: 110 (16 Dec 2018 12:30) (89 - 118)  BP: 112/46 (16 Dec 2018 12:30) (110/55 - 176/82)  BP(mean): --  RR: 19 (16 Dec 2018 12:30) (17 - 19)  SpO2: 98% (16 Dec 2018 12:30) (92% - 98%)    PHYSICAL EXAM:  GENERAL: NAD, cachectic   HEAD:  Atraumatic, Normocephalic  EYES: EOMI   NECK: Supple   NERVOUS SYSTEM:  Alert, murmuring under her breath     CHEST/LUNG: Dec BS b/l  HEART: Regular rate and rhythm; No murmurs, rubs, or gallops  ABDOMEN: Soft, Nontender, Nondistended; Bowel sounds present  EXTREMITIES: No clubbing, cyanosis, or edema    LABS:                        13.0   6.86  )-----------( 95       ( 16 Dec 2018 08:15 )             38.6     12-16    143  |  109<H>  |  26<H>  ----------------------------<  156<H>  3.9   |  23  |  1.10    Ca    8.7      16 Dec 2018 08:15  Phos  2.7     12-  Mg     2.0     12-    TPro  6.5  /  Alb  2.7<L>  /  TBili  0.2  /  DBili  x   /  AST  128<H>  /  ALT  116<H>  /  AlkPhos  196<H>  12-14    PT/INR - ( 14 Dec 2018 23:59 )   PT: 9.9 sec;   INR: 0.89 ratio         PTT - ( 14 Dec 2018 23:59 )  PTT:27.8 sec  Urinalysis Basic - ( 15 Dec 2018 05:57 )    Color: Yellow / Appearance: Clear / S.010 / pH: x  Gluc: x / Ketone: Negative  / Bili: Negative / Urobili: Negative mg/dL   Blood: x / Protein: 100 mg/dL / Nitrite: Negative   Leuk Esterase: Negative / RBC: x / WBC x   Sq Epi: x / Non Sq Epi: Occasional / Bacteria: x      CAPILLARY BLOOD GLUCOSE      POCT Blood Glucose.: 137 mg/dL (16 Dec 2018 10:56)  POCT Blood Glucose.: 164 mg/dL (16 Dec 2018 07:54)  POCT Blood Glucose.: 138 mg/dL (15 Dec 2018 21:24)      Culture - Urine (collected 15 Dec 2018 11:23)  Source: .Urine Clean Catch (Midstream)  Final Report (16 Dec 2018 10:58):    No growth    Culture - Blood (collected 15 Dec 2018 10:42)  Source: .Blood Blood-Peripheral  Preliminary Report (16 Dec 2018 11:01):    No growth to date.    Culture - Blood (collected 15 Dec 2018 10:41)  Source: .Blood Blood-Peripheral  Preliminary Report (16 Dec 2018 11:01):    No growth to date.      RADIOLOGY & ADDITIONAL TESTS:

## 2018-12-16 NOTE — PROGRESS NOTE ADULT - ASSESSMENT
89 y/o female w/ advanced dementia, Anxiety, lower back pain, CAD status post MI, CKD II-III, Chronic CHF, LBBB, glaucoma, DM2, HTN, HLD, OA, COPD with Emphysema and macular degeneration who p/w acute hypoxic respiratory failure and was found to have parainfluenza.     Acute hypoxic respiratory failure  - RVP showed parainfluenza, will stop antibiotics  - radiology did not see PNA on CXR  - NGTD on blood cultures   - repeat lactate WNL  - procalcitonin 0.25  - c/w Tylenol and ibuprofen for fevers  - Pulm note read and appreciated     CAD  - c/w ASA  Dementia w/ anxiety  - c/w Remeron      WADE on CKD II-III  - resolved    COPD   - c/w Duoneb    Diabetes mellitus II  - Hgb A1C is 6  - c/w ISS    chronic CHF  - no previous ECHO awilable, will check  - start low dose metoprolol  - c/w enalapril     HTN  - c/w enalapril & add BB for CHF    Transaminitis   - pt not on any statin so may have chronic liver disease  - will check hepatitis panel and abdominal US      Prophylaxis:  DVT: Lovenox  GI: Protonix 89 y/o female w/ advanced dementia, Anxiety, lower back pain, CAD status post MI, CKD II-III, Chronic CHF, LBBB, glaucoma, DM2, HTN, HLD, OA, COPD with Emphysema and macular degeneration who p/w acute hypoxic respiratory failure and was found to have parainfluenza.     Acute hypoxic respiratory failure  - RVP showed parainfluenza, will stop antibiotics  - radiology did not see PNA on CXR  - NGTD on blood cultures   - repeat lactate WNL  - procalcitonin 0.25  - c/w Tylenol and ibuprofen for fevers  - Pulm note read and appreciated     CAD  - c/w ASA  Dementia w/ anxiety  - c/w Remeron    WADE on CKD II-III  - resolved    COPD   - c/w Duoneb    Diabetes mellitus II  - Hgb A1C is 6  - c/w ISS    chronic CHF  - no previous ECHO available, will check  - start low dose metoprolol  - c/w enalapril     HTN  - c/w enalapril & add BB for CHF    Transaminitis   - pt not on any statin so may have chronic liver disease  - will check hepatitis panel and abdominal US    Prophylaxis:  DVT: Lovenox  GI: Protonix  Code Status: DNR

## 2018-12-16 NOTE — PROGRESS NOTE ADULT - SUBJECTIVE AND OBJECTIVE BOX
INTERVAL HPI:  89 y/o female w/advanced Dementia, Anxiety, lower back pain,CAD, CHF, LBBB,  Glaucoma, DM2 HTN, OA and Macular degeneration sent from NH for respiratory distress and desaturating into mid 80's.  Pt was reported to have non productive cough for 3-4 days and low grade temps and sent in for evaluation.  pt unable to contribute.   In hospital with temperature of 102 and hypoxia. not able to provide history.    OVERNIGHT EVENTS:  Responsive, low grade fever noted.    Vital Signs Last 24 Hrs  T(C): 37.8 (16 Dec 2018 18:55), Max: 38.2 (16 Dec 2018 08:16)  T(F): 100.1 (16 Dec 2018 18:55), Max: 100.7 (16 Dec 2018 08:16)  HR: 104 (16 Dec 2018 18:23) (92 - 118)  BP: 133/77 (16 Dec 2018 16:00) (112/46 - 176/82)  BP(mean): --  RR: 19 (16 Dec 2018 16:00) (17 - 19)  SpO2: 97% (16 Dec 2018 18:23) (92% - 98%)    PHYSICAL EXAM:  GEN:         Awake, responsive and comfortable.  HEENT:    Normal.    RESP:       no wheezing.  CVS:             Regular rate and rhythm.   ABD:         Soft, non-tender, non-distended;     MEDICATIONS  (STANDING):  ALBUTerol/ipratropium for Nebulization 3 milliLiter(s) Nebulizer every 6 hours  aspirin  chewable 81 milliGRAM(s) Oral daily  dextrose 5%. 1000 milliLiter(s) (50 mL/Hr) IV Continuous <Continuous>  dextrose 50% Injectable 12.5 Gram(s) IV Push once  dextrose 50% Injectable 25 Gram(s) IV Push once  dextrose 50% Injectable 25 Gram(s) IV Push once  enalapril 20 milliGRAM(s) Oral daily  enoxaparin Injectable 30 milliGRAM(s) SubCutaneous every 24 hours  insulin lispro (HumaLOG) corrective regimen sliding scale   SubCutaneous three times a day before meals  latanoprost 0.005% Ophthalmic Solution 1 Drop(s) Both EYES at bedtime  metoprolol tartrate 12.5 milliGRAM(s) Oral two times a day  mirtazapine 15 milliGRAM(s) Oral at bedtime  multivitamin 1 Tablet(s) Oral daily  pantoprazole   Suspension 40 milliGRAM(s) Oral daily    MEDICATIONS  (PRN):  acetaminophen  Suppository .. 650 milliGRAM(s) Rectal every 6 hours PRN Temp greater or equal to 38C (100.4F), Mild Pain (1 - 3)  dextrose 40% Gel 15 Gram(s) Oral once PRN Blood Glucose LESS THAN 70 milliGRAM(s)/deciliter  glucagon  Injectable 1 milliGRAM(s) IntraMuscular once PRN Glucose LESS THAN 70 milligrams/deciliter    LABS:                        13.0   6.86  )-----------( 95       ( 16 Dec 2018 08:15 )             38.6     12-16    143  |  109<H>  |  26<H>  ----------------------------<  156<H>  3.9   |  23  |  1.10    Ca    8.7      16 Dec 2018 08:15  Phos  2.7     12-16  Mg     2.0     12-16    TPro  6.5  /  Alb  2.7<L>  /  TBili  0.2  /  DBili  x   /  AST  128<H>  /  ALT  116<H>  /  AlkPhos  196<H>  12-14    PT/INR - ( 14 Dec 2018 23:59 )   PT: 9.9 sec;   INR: 0.89 ratio      PTT - ( 14 Dec 2018 23:59 )  PTT:27.8 sec    Urinalysis Basic - ( 15 Dec 2018 05:57 )    Color: Yellow / Appearance: Clear / S.010 / pH: x  Gluc: x / Ketone: Negative  / Bili: Negative / Urobili: Negative mg/dL   Blood: x / Protein: 100 mg/dL / Nitrite: Negative   Leuk Esterase: Negative / RBC: x / WBC x   Sq Epi: x / Non Sq Epi: Occasional / Bacteria: x    ASSESSMENT AND PLAN:  ·	Acute Respiratory distress.  ·	Hypoxia.  ·	Para Influenza Tracheobronchitis.  ·	Acute kidney Injury.  ·	CAD history.  ·	CHF history.  ·	HTN.  ·	DM  ·	Dementia.    RVP positive for para influenza.  Procalcitonin mildly elevated.  Continue O2 and nebulizer.

## 2018-12-17 LAB
ALBUMIN SERPL ELPH-MCNC: 2.4 G/DL — LOW (ref 3.3–5)
ALP SERPL-CCNC: 144 U/L — HIGH (ref 40–120)
ALT FLD-CCNC: 76 U/L — SIGNIFICANT CHANGE UP (ref 12–78)
ANION GAP SERPL CALC-SCNC: 9 MMOL/L — SIGNIFICANT CHANGE UP (ref 5–17)
AST SERPL-CCNC: 119 U/L — HIGH (ref 15–37)
BASOPHILS # BLD AUTO: 0.01 K/UL — SIGNIFICANT CHANGE UP (ref 0–0.2)
BASOPHILS NFR BLD AUTO: 0.2 % — SIGNIFICANT CHANGE UP (ref 0–2)
BILIRUB SERPL-MCNC: 0.4 MG/DL — SIGNIFICANT CHANGE UP (ref 0.2–1.2)
BUN SERPL-MCNC: 33 MG/DL — HIGH (ref 7–23)
CALCIUM SERPL-MCNC: 9.3 MG/DL — SIGNIFICANT CHANGE UP (ref 8.5–10.1)
CHLORIDE SERPL-SCNC: 112 MMOL/L — HIGH (ref 96–108)
CO2 SERPL-SCNC: 24 MMOL/L — SIGNIFICANT CHANGE UP (ref 22–31)
CREAT SERPL-MCNC: 1.13 MG/DL — SIGNIFICANT CHANGE UP (ref 0.5–1.3)
EOSINOPHIL # BLD AUTO: 0 K/UL — SIGNIFICANT CHANGE UP (ref 0–0.5)
EOSINOPHIL NFR BLD AUTO: 0 % — SIGNIFICANT CHANGE UP (ref 0–6)
GLUCOSE BLDC GLUCOMTR-MCNC: 102 MG/DL — HIGH (ref 70–99)
GLUCOSE BLDC GLUCOMTR-MCNC: 104 MG/DL — HIGH (ref 70–99)
GLUCOSE BLDC GLUCOMTR-MCNC: 112 MG/DL — HIGH (ref 70–99)
GLUCOSE SERPL-MCNC: 115 MG/DL — HIGH (ref 70–99)
HAV IGM SER-ACNC: SIGNIFICANT CHANGE UP
HBV CORE IGM SER-ACNC: REACTIVE
HBV SURFACE AG SER-ACNC: SIGNIFICANT CHANGE UP
HCT VFR BLD CALC: 37.2 % — SIGNIFICANT CHANGE UP (ref 34.5–45)
HCV AB S/CO SERPL IA: 0.09 S/CO — SIGNIFICANT CHANGE UP
HCV AB SERPL-IMP: SIGNIFICANT CHANGE UP
HGB BLD-MCNC: 12.2 G/DL — SIGNIFICANT CHANGE UP (ref 11.5–15.5)
IMM GRANULOCYTES NFR BLD AUTO: 0.2 % — SIGNIFICANT CHANGE UP (ref 0–1.5)
LYMPHOCYTES # BLD AUTO: 1.37 K/UL — SIGNIFICANT CHANGE UP (ref 1–3.3)
LYMPHOCYTES # BLD AUTO: 24.7 % — SIGNIFICANT CHANGE UP (ref 13–44)
MAGNESIUM SERPL-MCNC: 2.1 MG/DL — SIGNIFICANT CHANGE UP (ref 1.6–2.6)
MCHC RBC-ENTMCNC: 29.9 PG — SIGNIFICANT CHANGE UP (ref 27–34)
MCHC RBC-ENTMCNC: 32.8 GM/DL — SIGNIFICANT CHANGE UP (ref 32–36)
MCV RBC AUTO: 91.2 FL — SIGNIFICANT CHANGE UP (ref 80–100)
MONOCYTES # BLD AUTO: 0.47 K/UL — SIGNIFICANT CHANGE UP (ref 0–0.9)
MONOCYTES NFR BLD AUTO: 8.5 % — SIGNIFICANT CHANGE UP (ref 2–14)
NEUTROPHILS # BLD AUTO: 3.68 K/UL — SIGNIFICANT CHANGE UP (ref 1.8–7.4)
NEUTROPHILS NFR BLD AUTO: 66.4 % — SIGNIFICANT CHANGE UP (ref 43–77)
PLATELET # BLD AUTO: 90 K/UL — LOW (ref 150–400)
POTASSIUM SERPL-MCNC: 3.5 MMOL/L — SIGNIFICANT CHANGE UP (ref 3.5–5.3)
POTASSIUM SERPL-SCNC: 3.5 MMOL/L — SIGNIFICANT CHANGE UP (ref 3.5–5.3)
PROT SERPL-MCNC: 6.5 GM/DL — SIGNIFICANT CHANGE UP (ref 6–8.3)
RBC # BLD: 4.08 M/UL — SIGNIFICANT CHANGE UP (ref 3.8–5.2)
RBC # FLD: 13.3 % — SIGNIFICANT CHANGE UP (ref 10.3–14.5)
SODIUM SERPL-SCNC: 145 MMOL/L — SIGNIFICANT CHANGE UP (ref 135–145)
WBC # BLD: 5.54 K/UL — SIGNIFICANT CHANGE UP (ref 3.8–10.5)
WBC # FLD AUTO: 5.54 K/UL — SIGNIFICANT CHANGE UP (ref 3.8–10.5)

## 2018-12-17 PROCEDURE — 93306 TTE W/DOPPLER COMPLETE: CPT | Mod: 26

## 2018-12-17 PROCEDURE — 76700 US EXAM ABDOM COMPLETE: CPT | Mod: 26

## 2018-12-17 PROCEDURE — 99233 SBSQ HOSP IP/OBS HIGH 50: CPT

## 2018-12-17 RX ADMIN — MIRTAZAPINE 15 MILLIGRAM(S): 45 TABLET, ORALLY DISINTEGRATING ORAL at 21:35

## 2018-12-17 RX ADMIN — ENOXAPARIN SODIUM 30 MILLIGRAM(S): 100 INJECTION SUBCUTANEOUS at 12:51

## 2018-12-17 RX ADMIN — Medication 20 MILLIGRAM(S): at 12:50

## 2018-12-17 RX ADMIN — Medication 3 MILLILITER(S): at 23:34

## 2018-12-17 RX ADMIN — Medication 3 MILLILITER(S): at 06:12

## 2018-12-17 RX ADMIN — Medication 1 TABLET(S): at 12:51

## 2018-12-17 RX ADMIN — LATANOPROST 1 DROP(S): 0.05 SOLUTION/ DROPS OPHTHALMIC; TOPICAL at 21:35

## 2018-12-17 RX ADMIN — Medication 12.5 MILLIGRAM(S): at 17:39

## 2018-12-17 RX ADMIN — PANTOPRAZOLE SODIUM 40 MILLIGRAM(S): 20 TABLET, DELAYED RELEASE ORAL at 12:51

## 2018-12-17 RX ADMIN — Medication 81 MILLIGRAM(S): at 12:50

## 2018-12-17 RX ADMIN — Medication 3 MILLILITER(S): at 17:03

## 2018-12-17 NOTE — PROGRESS NOTE ADULT - SUBJECTIVE AND OBJECTIVE BOX
INTERVAL HPI:  89 y/o female w/advanced Dementia, Anxiety, lower back pain,CAD, CHF, LBBB,  Glaucoma, DM2 HTN, OA and Macular degeneration sent from NH for respiratory distress and desaturating into mid 80's.  Pt was reported to have non productive cough for 3-4 days and low grade temps and sent in for evaluation.  pt unable to contribute.   In hospital with temperature of 102 and hypoxia. not able to provide history.    OVERNIGHT EVENTS:  Resting comfortably.    Vital Signs Last 24 Hrs  T(C): 36.7 (17 Dec 2018 15:48), Max: 37.6 (17 Dec 2018 00:31)  T(F): 98 (17 Dec 2018 15:48), Max: 99.7 (17 Dec 2018 00:31)  HR: 105 (17 Dec 2018 17:11) (74 - 105)  BP: 140/66 (17 Dec 2018 15:48) (96/60 - 140/66)  BP(mean): --  RR: 17 (17 Dec 2018 15:48) (16 - 18)  SpO2: 96% (17 Dec 2018 17:11) (94% - 98%)    PHYSICAL EXAM:  GEN:         Awake, responsive and comfortable.  HEENT:    Normal.    RESP:        no wheezing.  CVS:          Regular rate and rhythm.   ABD:         Soft, non-tender, non-distended;     MEDICATIONS  (STANDING):  ALBUTerol/ipratropium for Nebulization 3 milliLiter(s) Nebulizer every 6 hours  aspirin  chewable 81 milliGRAM(s) Oral daily  dextrose 5%. 1000 milliLiter(s) (50 mL/Hr) IV Continuous <Continuous>  dextrose 50% Injectable 12.5 Gram(s) IV Push once  dextrose 50% Injectable 25 Gram(s) IV Push once  dextrose 50% Injectable 25 Gram(s) IV Push once  enalapril 20 milliGRAM(s) Oral daily  enoxaparin Injectable 30 milliGRAM(s) SubCutaneous every 24 hours  insulin lispro (HumaLOG) corrective regimen sliding scale   SubCutaneous three times a day before meals  latanoprost 0.005% Ophthalmic Solution 1 Drop(s) Both EYES at bedtime  metoprolol tartrate 12.5 milliGRAM(s) Oral two times a day  mirtazapine 15 milliGRAM(s) Oral at bedtime  multivitamin 1 Tablet(s) Oral daily  pantoprazole   Suspension 40 milliGRAM(s) Oral daily    MEDICATIONS  (PRN):  acetaminophen  Suppository .. 650 milliGRAM(s) Rectal every 6 hours PRN Temp greater or equal to 38C (100.4F), Mild Pain (1 - 3)  dextrose 40% Gel 15 Gram(s) Oral once PRN Blood Glucose LESS THAN 70 milliGRAM(s)/deciliter  glucagon  Injectable 1 milliGRAM(s) IntraMuscular once PRN Glucose LESS THAN 70 milligrams/deciliter    LABS:                        12.2   5.54  )-----------( 90       ( 17 Dec 2018 07:56 )             37.2     12-17    145  |  112<H>  |  33<H>  ----------------------------<  115<H>  3.5   |  24  |  1.13    Ca    9.3      17 Dec 2018 07:56  Phos  2.7     12-16  Mg     2.1     12-17    TPro  6.5  /  Alb  2.4<L>  /  TBili  0.4  /  DBili  x   /  AST  119<H>  /  ALT  76  /  AlkPhos  144<H>  12-17    ASSESSMENT AND PLAN:  ·	Acute Respiratory distress.  ·	Hypoxia.  ·	Para Influenza Tracheobronchitis.  ·	Acute kidney Injury.  ·	CAD history.  ·	CHF history.  ·	HTN.  ·	DM  ·	Dementia.    Continue treatment.  Nutritional support.

## 2018-12-17 NOTE — PROGRESS NOTE ADULT - ASSESSMENT
1.  Acute hypoxic respiratory failure - in setting of viral infection, trachebronchitis.  Check oxygen on room air.  Continue nebulizers.  Continue supportive care.     2.  Dementia.  Continue outpatient remeron.    3.  Renal insufficiency - most likely CKD Stage III.  Consider reduce dose of Enalapril.  Avoid nephrotoxins.  Dose all medications for reduced eGFR.    4.  Chronic CHF - per history.  Follow-up echo results.    5.  HTN.  Continue metoprolol and enalapril.    6.  Abnormal LFTs - improved.  Avoid hepatotoxic medications.

## 2018-12-17 NOTE — PROGRESS NOTE ADULT - SUBJECTIVE AND OBJECTIVE BOX
Patient is a 89y old  Female who presents with a chief complaint of respiratory distress (16 Dec 2018 20:19)      INTERVAL HPI/OVERNIGHT EVENTS:      REVIEW OF SYSTEMS:    MEDICATIONS  (STANDING):  ALBUTerol/ipratropium for Nebulization 3 milliLiter(s) Nebulizer every 6 hours  aspirin  chewable 81 milliGRAM(s) Oral daily  dextrose 5%. 1000 milliLiter(s) (50 mL/Hr) IV Continuous <Continuous>  dextrose 50% Injectable 12.5 Gram(s) IV Push once  dextrose 50% Injectable 25 Gram(s) IV Push once  dextrose 50% Injectable 25 Gram(s) IV Push once  enalapril 20 milliGRAM(s) Oral daily  enoxaparin Injectable 30 milliGRAM(s) SubCutaneous every 24 hours  insulin lispro (HumaLOG) corrective regimen sliding scale   SubCutaneous three times a day before meals  latanoprost 0.005% Ophthalmic Solution 1 Drop(s) Both EYES at bedtime  metoprolol tartrate 12.5 milliGRAM(s) Oral two times a day  mirtazapine 15 milliGRAM(s) Oral at bedtime  multivitamin 1 Tablet(s) Oral daily  pantoprazole   Suspension 40 milliGRAM(s) Oral daily    MEDICATIONS  (PRN):  acetaminophen  Suppository .. 650 milliGRAM(s) Rectal every 6 hours PRN Temp greater or equal to 38C (100.4F), Mild Pain (1 - 3)  dextrose 40% Gel 15 Gram(s) Oral once PRN Blood Glucose LESS THAN 70 milliGRAM(s)/deciliter  glucagon  Injectable 1 milliGRAM(s) IntraMuscular once PRN Glucose LESS THAN 70 milligrams/deciliter      Allergies    PC Pen VK (Swelling)  penicillin (Unknown)    Intolerances        Vital Signs Last 24 Hrs  T(C): 37.1 (17 Dec 2018 11:34), Max: 38.1 (16 Dec 2018 16:00)  T(F): 98.8 (17 Dec 2018 11:34), Max: 100.5 (16 Dec 2018 16:00)  HR: 84 (17 Dec 2018 11:34) (74 - 113)  BP: 112/58 (17 Dec 2018 11:34) (96/60 - 133/77)  BP(mean): --  RR: 16 (17 Dec 2018 11:34) (16 - 19)  SpO2: 98% (17 Dec 2018 11:34) (93% - 98%)    PHYSICAL EXAM:  GENERAL: NAD.  CHEST/LUNG: Clear to auscultation; No rales, rhonchi, or wheezing.  Respiratory effort does not appear labored.  HEART: Regular rate and rhythm; S1 and S2,  no murmurs, rubs, or gallops.  ABDOMEN: Soft, not tender to palpation.  No masses or HSM appreciated.  No distension.  Bowel sounds present.  EXTREMITIES:  No clubbing, cyanosis, or edema.  Moves all extremities with strength 5/5.  SKIN: No obvious rashes or lesions.  Turgor okay.  NEURO:  Alert and oriented x 3, no focal sensory or  motor deficit, DTR 2+ bilaterally.    LABS:                        12.2   5.54  )-----------( 90       ( 17 Dec 2018 07:56 )             37.2     12-17    145  |  112<H>  |  33<H>  ----------------------------<  115<H>  3.5   |  24  |  1.13    Ca    9.3      17 Dec 2018 07:56  Phos  2.7     12-16  Mg     2.1     12-17    TPro  6.5  /  Alb  2.4<L>  /  TBili  0.4  /  DBili  x   /  AST  119<H>  /  ALT  76  /  AlkPhos  144<H>  12-17        CAPILLARY BLOOD GLUCOSE      POCT Blood Glucose.: 104 mg/dL (17 Dec 2018 11:40)  POCT Blood Glucose.: 125 mg/dL (17 Dec 2018 07:54)  POCT Blood Glucose.: 189 mg/dL (16 Dec 2018 21:13)  POCT Blood Glucose.: 122 mg/dL (16 Dec 2018 17:10)      Culture - Urine (collected 15 Dec 2018 11:23)  Source: .Urine Clean Catch (Midstream)  Final Report (16 Dec 2018 10:58):    No growth    Culture - Blood (collected 15 Dec 2018 10:42)  Source: .Blood Blood-Peripheral  Preliminary Report (16 Dec 2018 11:01):    No growth to date.    Culture - Blood (collected 15 Dec 2018 10:41)  Source: .Blood Blood-Peripheral  Preliminary Report (16 Dec 2018 11:01):    No growth to date. Patient is a 89y old  Female who presents with a chief complaint of respiratory distress (16 Dec 2018 20:19)      INTERVAL HPI/OVERNIGHT EVENTS:    Laying in bed, no obvious distress.    Acute respiratory failure - oxygen saturation 98% on 2LNC.  Does not appear in distress or labored breathing.    Dementia - confused.  Doesn't follow commands.    HTN - BPs controlled on enalapril and metoprolol.    REVIEW OF SYSTEMS:  Unable to obtain due to mental status.  Per discussion with RN, no reported diarrhea.    MEDICATIONS  (STANDING):  ALBUTerol/ipratropium for Nebulization 3 milliLiter(s) Nebulizer every 6 hours  aspirin  chewable 81 milliGRAM(s) Oral daily  dextrose 5%. 1000 milliLiter(s) (50 mL/Hr) IV Continuous <Continuous>  dextrose 50% Injectable 12.5 Gram(s) IV Push once  dextrose 50% Injectable 25 Gram(s) IV Push once  dextrose 50% Injectable 25 Gram(s) IV Push once  enalapril 20 milliGRAM(s) Oral daily  enoxaparin Injectable 30 milliGRAM(s) SubCutaneous every 24 hours  insulin lispro (HumaLOG) corrective regimen sliding scale   SubCutaneous three times a day before meals  latanoprost 0.005% Ophthalmic Solution 1 Drop(s) Both EYES at bedtime  metoprolol tartrate 12.5 milliGRAM(s) Oral two times a day  mirtazapine 15 milliGRAM(s) Oral at bedtime  multivitamin 1 Tablet(s) Oral daily  pantoprazole   Suspension 40 milliGRAM(s) Oral daily    MEDICATIONS  (PRN):  acetaminophen  Suppository .. 650 milliGRAM(s) Rectal every 6 hours PRN Temp greater or equal to 38C (100.4F), Mild Pain (1 - 3)  dextrose 40% Gel 15 Gram(s) Oral once PRN Blood Glucose LESS THAN 70 milliGRAM(s)/deciliter  glucagon  Injectable 1 milliGRAM(s) IntraMuscular once PRN Glucose LESS THAN 70 milligrams/deciliter      Allergies    PC Pen VK (Swelling)  penicillin (Unknown)    Intolerances    Vital Signs Last 24 Hrs  T(C): 37.1 (17 Dec 2018 11:34), Max: 38.1 (16 Dec 2018 16:00)  T(F): 98.8 (17 Dec 2018 11:34), Max: 100.5 (16 Dec 2018 16:00)  HR: 84 (17 Dec 2018 11:34) (74 - 113)  BP: 112/58 (17 Dec 2018 11:34) (96/60 - 133/77)  BP(mean): --  RR: 16 (17 Dec 2018 11:34) (16 - 19)  SpO2: 98% (17 Dec 2018 11:34) (93% - 98%)    PHYSICAL EXAM:  GENERAL: NAD, thin appearing elderly female.  CHEST/LUNG: Clear to auscultation anteriorly, diminished at bases.  Respiratory effort does not appear labored.  HEART: Regular rate and rhythm; S1 and S2,  no murmurs, rubs, or gallops.  ABDOMEN: Soft, not tender to palpation.  No masses or HSM appreciated.  No distension.  Bowel sounds present.  EXTREMITIES:  No clubbing, cyanosis, or edema.  Unable to assess movement, not cooperative.  SKIN: No obvious rashes or lesions.  Turgor decreased.  NEURO:  Unable to assess, not cooperative.    LABS:                        12.2   5.54  )-----------( 90       ( 17 Dec 2018 07:56 )             37.2     12-17    145  |  112<H>  |  33<H>  ----------------------------<  115<H>  3.5   |  24  |  1.13    Ca    9.3      17 Dec 2018 07:56  Phos  2.7     12-16  Mg     2.1     12-17    TPro  6.5  /  Alb  2.4<L>  /  TBili  0.4  /  DBili  x   /  AST  119<H>  /  ALT  76  /  AlkPhos  144<H>  12-17        CAPILLARY BLOOD GLUCOSE      POCT Blood Glucose.: 104 mg/dL (17 Dec 2018 11:40)  POCT Blood Glucose.: 125 mg/dL (17 Dec 2018 07:54)  POCT Blood Glucose.: 189 mg/dL (16 Dec 2018 21:13)  POCT Blood Glucose.: 122 mg/dL (16 Dec 2018 17:10)      Culture - Urine (collected 15 Dec 2018 11:23)  Source: .Urine Clean Catch (Midstream)  Final Report (16 Dec 2018 10:58):    No growth    Culture - Blood (collected 15 Dec 2018 10:42)  Source: .Blood Blood-Peripheral  Preliminary Report (16 Dec 2018 11:01):    No growth to date.    Culture - Blood (collected 15 Dec 2018 10:41)  Source: .Blood Blood-Peripheral  Preliminary Report (16 Dec 2018 11:01):    No growth to date.      US ABDOMEN  IMPRESSION:     Distended gallbladder with multiple gallstones. No gallbladder wall   thickening or pericholecystic fluid. Sonographic Sharif's sign is   negative.    Bilateral renal cysts.    Heterogeneous, slightly echogenic liver.

## 2018-12-18 ENCOUNTER — TRANSCRIPTION ENCOUNTER (OUTPATIENT)
Age: 83
End: 2018-12-18

## 2018-12-18 LAB
ANION GAP SERPL CALC-SCNC: 11 MMOL/L — SIGNIFICANT CHANGE UP (ref 5–17)
BUN SERPL-MCNC: 34 MG/DL — HIGH (ref 7–23)
CALCIUM SERPL-MCNC: 9 MG/DL — SIGNIFICANT CHANGE UP (ref 8.5–10.1)
CHLORIDE SERPL-SCNC: 115 MMOL/L — HIGH (ref 96–108)
CO2 SERPL-SCNC: 22 MMOL/L — SIGNIFICANT CHANGE UP (ref 22–31)
CREAT SERPL-MCNC: 0.86 MG/DL — SIGNIFICANT CHANGE UP (ref 0.5–1.3)
GLUCOSE BLDC GLUCOMTR-MCNC: 104 MG/DL — HIGH (ref 70–99)
GLUCOSE BLDC GLUCOMTR-MCNC: 121 MG/DL — HIGH (ref 70–99)
GLUCOSE BLDC GLUCOMTR-MCNC: 136 MG/DL — HIGH (ref 70–99)
GLUCOSE BLDC GLUCOMTR-MCNC: 83 MG/DL — SIGNIFICANT CHANGE UP (ref 70–99)
GLUCOSE SERPL-MCNC: 84 MG/DL — SIGNIFICANT CHANGE UP (ref 70–99)
HBV SURFACE AB SER-ACNC: SIGNIFICANT CHANGE UP
HCT VFR BLD CALC: 38.6 % — SIGNIFICANT CHANGE UP (ref 34.5–45)
HGB BLD-MCNC: 12.3 G/DL — SIGNIFICANT CHANGE UP (ref 11.5–15.5)
MCHC RBC-ENTMCNC: 29.5 PG — SIGNIFICANT CHANGE UP (ref 27–34)
MCHC RBC-ENTMCNC: 31.9 GM/DL — LOW (ref 32–36)
MCV RBC AUTO: 92.6 FL — SIGNIFICANT CHANGE UP (ref 80–100)
NRBC # BLD: 0 /100 WBCS — SIGNIFICANT CHANGE UP (ref 0–0)
PLATELET # BLD AUTO: 104 K/UL — LOW (ref 150–400)
POTASSIUM SERPL-MCNC: 3.9 MMOL/L — SIGNIFICANT CHANGE UP (ref 3.5–5.3)
POTASSIUM SERPL-SCNC: 3.9 MMOL/L — SIGNIFICANT CHANGE UP (ref 3.5–5.3)
RBC # BLD: 4.17 M/UL — SIGNIFICANT CHANGE UP (ref 3.8–5.2)
RBC # FLD: 13.4 % — SIGNIFICANT CHANGE UP (ref 10.3–14.5)
SODIUM SERPL-SCNC: 148 MMOL/L — HIGH (ref 135–145)
WBC # BLD: 5.07 K/UL — SIGNIFICANT CHANGE UP (ref 3.8–10.5)
WBC # FLD AUTO: 5.07 K/UL — SIGNIFICANT CHANGE UP (ref 3.8–10.5)

## 2018-12-18 PROCEDURE — 99239 HOSP IP/OBS DSCHRG MGMT >30: CPT

## 2018-12-18 RX ORDER — AMLODIPINE BESYLATE 2.5 MG/1
5 TABLET ORAL DAILY
Qty: 0 | Refills: 0 | Status: DISCONTINUED | OUTPATIENT
Start: 2018-12-18 | End: 2018-12-19

## 2018-12-18 RX ORDER — HYDRALAZINE HCL 50 MG
10 TABLET ORAL ONCE
Qty: 0 | Refills: 0 | Status: COMPLETED | OUTPATIENT
Start: 2018-12-18 | End: 2018-12-18

## 2018-12-18 RX ORDER — SODIUM CHLORIDE 9 MG/ML
1000 INJECTION, SOLUTION INTRAVENOUS
Qty: 0 | Refills: 0 | Status: DISCONTINUED | OUTPATIENT
Start: 2018-12-18 | End: 2018-12-19

## 2018-12-18 RX ORDER — METOPROLOL TARTRATE 50 MG
0.5 TABLET ORAL
Qty: 30 | Refills: 0 | OUTPATIENT
Start: 2018-12-18 | End: 2019-01-16

## 2018-12-18 RX ADMIN — Medication 12.5 MILLIGRAM(S): at 05:18

## 2018-12-18 RX ADMIN — LATANOPROST 1 DROP(S): 0.05 SOLUTION/ DROPS OPHTHALMIC; TOPICAL at 21:30

## 2018-12-18 RX ADMIN — Medication 10 MILLIGRAM(S): at 20:42

## 2018-12-18 RX ADMIN — AMLODIPINE BESYLATE 5 MILLIGRAM(S): 2.5 TABLET ORAL at 23:32

## 2018-12-18 RX ADMIN — ENOXAPARIN SODIUM 30 MILLIGRAM(S): 100 INJECTION SUBCUTANEOUS at 11:45

## 2018-12-18 RX ADMIN — Medication 20 MILLIGRAM(S): at 05:18

## 2018-12-18 RX ADMIN — MIRTAZAPINE 15 MILLIGRAM(S): 45 TABLET, ORALLY DISINTEGRATING ORAL at 21:31

## 2018-12-18 RX ADMIN — Medication 81 MILLIGRAM(S): at 11:46

## 2018-12-18 RX ADMIN — PANTOPRAZOLE SODIUM 40 MILLIGRAM(S): 20 TABLET, DELAYED RELEASE ORAL at 11:46

## 2018-12-18 RX ADMIN — Medication 1 TABLET(S): at 11:46

## 2018-12-18 RX ADMIN — Medication 3 MILLILITER(S): at 17:58

## 2018-12-18 RX ADMIN — Medication 12.5 MILLIGRAM(S): at 17:13

## 2018-12-18 RX ADMIN — Medication 3 MILLILITER(S): at 11:38

## 2018-12-18 RX ADMIN — Medication 3 MILLILITER(S): at 05:32

## 2018-12-18 NOTE — PHYSICAL THERAPY INITIAL EVALUATION ADULT - ADDITIONAL COMMENTS
As per patient's assisted living facility [facility] at [phone], patient is a 1 person assist bed to chair and does not ambulate.

## 2018-12-18 NOTE — DISCHARGE NOTE ADULT - PLAN OF CARE
Monitor respiratory status.  Follow-up with primary care provider within one week. Parainfluenza, rapid RVP positive. Standard precaution. Encourage water intake.  At least 1.5 liters per day for next few days.  Consider checking serum sodium in 1-2 days. Chronic, present on admission. Continue pureed diet.  Continue honey-thickened fluids.  Advance as tolerated. EF 45-50% Continue enalapril.  Metoprolol 12.5 mg BID added. Continue supportive care. Keep respiratory status stable. Monitor for hypoxia.  Consider incentive spirometer.  Follow-up with primary care provider within one week.

## 2018-12-18 NOTE — DISCHARGE NOTE ADULT - MEDICATION SUMMARY - MEDICATIONS TO TAKE
I will START or STAY ON the medications listed below when I get home from the hospital:    acetaminophen 500 mg oral tablet  -- 2 tab(s) by mouth every 6 hours  -- This product contains acetaminophen.  Do not use  with any other product containing acetaminophen to prevent possible liver damage.    -- Indication: For Pain    Aspir 81  -- chewable  -- Indication: For Anti-platelet    celecoxib 200 mg oral capsule  -- 200 milligram(s) by mouth once a day  -- Indication: For Pain    enalapril 20 mg oral tablet  -- 1 tab(s) by mouth once a day  -- Indication: For hypertension    mirtazapine 15 mg oral tablet  -- 1 tab(s) by mouth once a day (at bedtime)  -- Indication: For anti-depressant    Atarax 10 mg oral tablet  -- 10 milligram(s) by mouth 1 to 3 times a day  -- Indication: For anxiety    metoprolol tartrate 25 mg oral tablet  -- 0.5 tab(s) by mouth 2 times a day   -- It is very important that you take or use this exactly as directed.  Do not skip doses or discontinue unless directed by your doctor.  May cause drowsiness.  Alcohol may intensify this effect.  Use care when operating dangerous machinery.  Some non-prescription drugs may aggravate your condition.  Read all labels carefully.  If a warning appears, check with your doctor before taking.  Take with food or milk.  This drug may impair the ability to drive or operate machinery.  Use care until you become familiar with its effects.    -- Indication: For hypertension/heart failure    latanoprost 0.005% ophthalmic solution  -- 1 drop(s) to each affected eye once a day (in the evening)both  eyes  -- Indication: For eye drop    pantoprazole 40 mg oral delayed release tablet  -- 1 tab(s) by mouth once a day  -- Indication: For anti-acid for stomach    Daily Anh oral tablet  -- 1 tab(s) by mouth once a day  -- Indication: For supplement I will START or STAY ON the medications listed below when I get home from the hospital:    acetaminophen 500 mg oral tablet  -- 2 tab(s) by mouth every 6 hours  -- This product contains acetaminophen.  Do not use  with any other product containing acetaminophen to prevent possible liver damage.    -- Indication: For Pain    Aspir 81  -- chewable  -- Indication: For CAD    celecoxib 200 mg oral capsule  -- 200 milligram(s) by mouth once a day  -- Indication: For Pain    enalapril 20 mg oral tablet  -- 1 tab(s) by mouth once a day  -- Indication: For HTN    mirtazapine 15 mg oral tablet  -- 1 tab(s) by mouth once a day (at bedtime)  -- Indication: For Depression    Atarax 10 mg oral tablet  -- 10 milligram(s) by mouth 1 to 3 times a day  -- Indication: For Anxiety    metoprolol tartrate 25 mg oral tablet  -- 0.5 tab(s) by mouth 2 times a day   -- It is very important that you take or use this exactly as directed.  Do not skip doses or discontinue unless directed by your doctor.  May cause drowsiness.  Alcohol may intensify this effect.  Use care when operating dangerous machinery.  Some non-prescription drugs may aggravate your condition.  Read all labels carefully.  If a warning appears, check with your doctor before taking.  Take with food or milk.  This drug may impair the ability to drive or operate machinery.  Use care until you become familiar with its effects.    -- Indication: For HTN    latanoprost 0.005% ophthalmic solution  -- 1 drop(s) to each affected eye once a day (in the evening)both  eyes  -- Indication: For Glaucoma    pantoprazole 40 mg oral delayed release tablet  -- 1 tab(s) by mouth once a day  -- Indication: For GERD    Daily Anh oral tablet  -- 1 tab(s) by mouth once a day  -- Indication: For Preventative measure I will START or STAY ON the medications listed below when I get home from the hospital:    acetaminophen 500 mg oral tablet  -- 2 tab(s) by mouth every 6 hours  -- This product contains acetaminophen.  Do not use  with any other product containing acetaminophen to prevent possible liver damage.    -- Indication: For Pain    Aspir 81  -- chewable  -- Indication: For CAD    celecoxib 200 mg oral capsule  -- 200 milligram(s) by mouth once a day  -- Indication: For Pain    enalapril 20 mg oral tablet  -- 1 tab(s) by mouth once a day  -- Indication: For HTN    mirtazapine 15 mg oral tablet  -- 1 tab(s) by mouth once a day (at bedtime)  -- Indication: For Depression    Atarax 10 mg oral tablet  -- 10 milligram(s) by mouth 1 to 3 times a day  -- Indication: For Anxiety    metoprolol tartrate 25 mg oral tablet  -- 0.5 tab(s) by mouth 2 times a day   -- It is very important that you take or use this exactly as directed.  Do not skip doses or discontinue unless directed by your doctor.  May cause drowsiness.  Alcohol may intensify this effect.  Use care when operating dangerous machinery.  Some non-prescription drugs may aggravate your condition.  Read all labels carefully.  If a warning appears, check with your doctor before taking.  Take with food or milk.  This drug may impair the ability to drive or operate machinery.  Use care until you become familiar with its effects.    -- Indication: For HTN    amLODIPine 5 mg oral tablet  -- 1 tab(s) by mouth once a day  -- Indication: For Hypertension    latanoprost 0.005% ophthalmic solution  -- 1 drop(s) to each affected eye once a day (in the evening)both  eyes  -- Indication: For Glaucoma    pantoprazole 40 mg oral delayed release tablet  -- 1 tab(s) by mouth once a day  -- Indication: For GERD    Daily Anh oral tablet  -- 1 tab(s) by mouth once a day  -- Indication: For Preventative measure

## 2018-12-18 NOTE — DISCHARGE NOTE ADULT - CARE PLAN
Principal Discharge DX:	Acute respiratory failure with hypoxia  Goal:	Keep respiratory status stable.  Assessment and plan of treatment:	Monitor for hypoxia.  Consider incentive spirometer.  Follow-up with primary care provider within one week.  Secondary Diagnosis:	Bronchitis  Assessment and plan of treatment:	Monitor respiratory status.  Follow-up with primary care provider within one week.  Secondary Diagnosis:	Viral infection  Goal:	Parainfluenza, rapid RVP positive.  Assessment and plan of treatment:	Standard precaution.  Secondary Diagnosis:	Hypernatremia  Assessment and plan of treatment:	Encourage water intake.  At least 1.5 liters per day for next few days.  Consider checking serum sodium in 1-2 days.  Secondary Diagnosis:	Dysphagia, unspecified type  Goal:	Chronic, present on admission.  Assessment and plan of treatment:	Continue pureed diet.  Continue honey-thickened fluids.  Advance as tolerated.  Secondary Diagnosis:	Chronic systolic (congestive) heart failure  Goal:	EF 45-50%  Assessment and plan of treatment:	Continue enalapril.  Metoprolol 12.5 mg BID added.  Secondary Diagnosis:	Dementia without behavioral disturbance, unspecified dementia type  Assessment and plan of treatment:	Continue supportive care.

## 2018-12-18 NOTE — DISCHARGE NOTE ADULT - CARE PROVIDER_API CALL
Primary care provider,   Follow-up with primary care provider within one week.  Phone: (   )    -  Fax: (   )    -

## 2018-12-18 NOTE — SWALLOW BEDSIDE ASSESSMENT ADULT - SLP PERTINENT HISTORY OF CURRENT PROBLEM
Acute hypoxic respiratory failure - in setting of viral infection, trachebronchitis.  RN reported pt ate good this AM

## 2018-12-18 NOTE — DIETITIAN INITIAL EVALUATION ADULT. - OTHER INFO
Pt seen for Consult for Stage 2 pressure ulcer or greater, and pt also with BMI < 19. Pt appeared very confused unable to answer questions effectively, unable to follow commands. Observed pt eating breakfast this AM with nursing assistant present and feeding pt, however pt was not swallowing food and was holding food in mouth. Pt currently on Dysphagia 1 pureed-honey consistency diet. Pta pt lived at assisted living facility and was on LENKA, pureed diet + Ensure 2x daily. Pt Pt seen for Consult for Stage 2 pressure ulcer or greater, and pt also with BMI < 19. Pt appeared very confused unable to answer questions effectively, unable to follow commands. Observed pt eating breakfast this AM with nursing assistant present and feeding pt, however pt was not swallowing food and was holding food in mouth. Pt currently on Dysphagia 1 pureed-honey consistency diet. Pta pt lived at assisted living facility and was on LENKA, pureed diet + Ensure 2x daily. Pt is DNR.

## 2018-12-18 NOTE — DIETITIAN INITIAL EVALUATION ADULT. - PERTINENT LABORATORY DATA
12-18 Na148 mmol/L<H> Glu 84 mg/dL K+ 3.9 mmol/L Cr  0.86 mg/dL BUN 34 mg/dL<H> WBC 5.07 K/uL 12-16 Phos 2.7 mg/dL 12-17 Alb 2.4 g/dL<L> 12-16 [id number] 6.0 %<H>

## 2018-12-18 NOTE — DIETITIAN INITIAL EVALUATION ADULT. - PERTINENT MEDS FT
MEDICATIONS  (STANDING):  ALBUTerol/ipratropium for Nebulization 3 milliLiter(s) Nebulizer every 6 hours  aspirin  chewable 81 milliGRAM(s) Oral daily  dextrose 5%. 1000 milliLiter(s) (50 mL/Hr) IV Continuous <Continuous>  dextrose 50% Injectable 12.5 Gram(s) IV Push once  dextrose 50% Injectable 25 Gram(s) IV Push once  dextrose 50% Injectable 25 Gram(s) IV Push once  enalapril 20 milliGRAM(s) Oral daily  enoxaparin Injectable 30 milliGRAM(s) SubCutaneous every 24 hours  insulin lispro (HumaLOG) corrective regimen sliding scale   SubCutaneous three times a day before meals  latanoprost 0.005% Ophthalmic Solution 1 Drop(s) Both EYES at bedtime  metoprolol tartrate 12.5 milliGRAM(s) Oral two times a day  mirtazapine 15 milliGRAM(s) Oral at bedtime  multivitamin 1 Tablet(s) Oral daily  pantoprazole   Suspension 40 milliGRAM(s) Oral daily    MEDICATIONS  (PRN):  acetaminophen  Suppository .. 650 milliGRAM(s) Rectal every 6 hours PRN Temp greater or equal to 38C (100.4F), Mild Pain (1 - 3)  dextrose 40% Gel 15 Gram(s) Oral once PRN Blood Glucose LESS THAN 70 milliGRAM(s)/deciliter  glucagon  Injectable 1 milliGRAM(s) IntraMuscular once PRN Glucose LESS THAN 70 milligrams/deciliter

## 2018-12-18 NOTE — DIETITIAN INITIAL EVALUATION ADULT. - ETIOLOGY
Inadequate energy/protein intake related to advanced dementia Inadequate energy/protein intake & increased energy/protein needs related to advanced dementia & multiple pressure ulcers

## 2018-12-18 NOTE — SWALLOW BEDSIDE ASSESSMENT ADULT - SLP GENERAL OBSERVATIONS
pt seen bedside, initially sleeping and needed mod prompts to alert and oriented to self and ?place with choice. pt inconsistently responded to simple want questions- she was essentially nonverbal except 3-4 single word utterances/or facial grimace/"ouch", she did not verbalize her needs or follow one step directions.

## 2018-12-18 NOTE — SWALLOW BEDSIDE ASSESSMENT ADULT - H & P REVIEW
yes/Pt is a 89 y/o female w/advanced dementia, Anxiety, lower back pain,cad, chf, LBBB,  glaucoma, DM2 HTN, CAD, OA and macular degeneration sent from NH for respiratory distress and desating into mid 80's.  Pt was reported to have non productive cough for 3-4 days and low grade temps and sent in for evaluation.  pt unable to contribute.

## 2018-12-18 NOTE — DISCHARGE NOTE ADULT - PATIENT PORTAL LINK FT
You can access the [facility] Portal, offered by [facility], by registering with the following website: [email or web address]

## 2018-12-18 NOTE — DIETITIAN INITIAL EVALUATION ADULT. - FACTORS AFF FOOD INTAKE
difficulty chewing/difficulty swallowing/change in mental status/difficulty feeding self/other (specify)/Advanced dementia

## 2018-12-18 NOTE — DISCHARGE NOTE ADULT - SECONDARY DIAGNOSIS.
Viral infection Hypernatremia Dysphagia, unspecified type Chronic systolic (congestive) heart failure Dementia without behavioral disturbance, unspecified dementia type Bronchitis

## 2018-12-18 NOTE — DIETITIAN INITIAL EVALUATION ADULT. - PHYSICAL APPEARANCE
debilitated/BMI: 18.8; No edema noted; Nutrition focused physical exam conducted; Subcutaneous fat loss: [severe] Orbital fat pads region, [unable - pt appeared edentulous]Buccal fat region, [severe]Triceps region,  [unable]Ribs region.  Muscle wasting: [severe]Temples region, [severe]Clavicle region, [severe]Shoulder region, [severe]Scapula region, [severe]Interosseous region,  [unable]thigh region, [unable]Calf region/emaciated/contracted

## 2018-12-18 NOTE — SWALLOW BEDSIDE ASSESSMENT ADULT - SWALLOW EVAL: RECOMMENDED FEEDING/EATING TECHNIQUES
allow for swallow between intakes/crush medication (when feasible)/maintain upright posture during/after eating for 30 mins/small sips/bites

## 2018-12-18 NOTE — PHYSICAL THERAPY INITIAL EVALUATION ADULT - PERTINENT HX OF CURRENT PROBLEM, REHAB EVAL
Patient admitted from assisted living facility with respiratory distress and desaturation to mid 80s with nonproductive cough x 3-4 days and low grade temp.

## 2018-12-18 NOTE — DIETITIAN INITIAL EVALUATION ADULT. - NS AS NUTRI INTERV MEDICAL AND FOOD SUPPLEMENTS
Recommend Ensure pudding 2x daily (340 kcals & 8 gm protein) & NSA Magic Cup 2x daily (520 kcals & 18 gm protein) Recommend Ensure pudding 2x daily (340 kcals & 8 gm protein) & NSA Magic Cup 2x daily (520 kcals & 18 gm protein) + 1 packet Bennie daily (80 kcals & 14 g amino acids)

## 2018-12-18 NOTE — DISCHARGE NOTE ADULT - OTHER SIGNIFICANT FINDINGS
ECHO  Summary:   1. Left ventricular ejection fraction, by visual estimation, is 45 to   50%.   2. Mildly decreased global left ventricular systolic function.   3. There is no left ventricular hypertrophy.   4. Mild mitral annular calcification.   5. Thickening and calcification of the anterior and posterior mitral   valve leaflets.   6. Trace tricuspid regurgitation.   7. The aortic valve mean gradient is 1.8 mmHg consistent with normally   opening aortic valve.    US ABDOMEN  IMPRESSION:     Distended gallbladder with multiple gallstones. No gallbladder wall   thickening or pericholecystic fluid. Sonographic Sharif's sign is   negative.    Bilateral renal cysts.    Heterogeneous, slightly echogenic liver.    CXR  Impression: No active pulmonary disease. No change.

## 2018-12-18 NOTE — PROGRESS NOTE ADULT - SUBJECTIVE AND OBJECTIVE BOX
INTERVAL HPI:  89 y/o female w/advanced Dementia, Anxiety, lower back pain,CAD, CHF, LBBB,  Glaucoma, DM2 HTN, OA and Macular degeneration sent from NH for respiratory distress and desaturating into mid 80's.  Pt was reported to have non productive cough for 3-4 days and low grade temps and sent in for evaluation.  pt unable to contribute.   In hospital with temperature of 102 and hypoxia. not able to provide history.    OVERNIGHT EVENTS:  Resting comfortably. No distress.    Vital Signs Last 24 Hrs  T(C): 36.3 (18 Dec 2018 17:05), Max: 37.3 (18 Dec 2018 05:21)  T(F): 97.3 (18 Dec 2018 17:05), Max: 99.2 (18 Dec 2018 05:21)  HR: 70 (18 Dec 2018 17:58) (70 - 85)  BP: 130/75 (18 Dec 2018 17:53) (130/62 - 160/70)  BP(mean): --  RR: 19 (18 Dec 2018 17:05) (17 - 19)  SpO2: 95% (18 Dec 2018 17:58) (94% - 97%)    PHYSICAL EXAM:  GEN:         Awake, responsive and comfortable.  HEENT:    Normal.    RESP:       no wheezing.  CVS:           Regular rate and rhythm.   ABD:         Soft, non-tender, non-distended;     MEDICATIONS  (STANDING):  ALBUTerol/ipratropium for Nebulization 3 milliLiter(s) Nebulizer every 6 hours  aspirin  chewable 81 milliGRAM(s) Oral daily  dextrose 5%. 1000 milliLiter(s) (50 mL/Hr) IV Continuous <Continuous>  dextrose 50% Injectable 12.5 Gram(s) IV Push once  dextrose 50% Injectable 25 Gram(s) IV Push once  dextrose 50% Injectable 25 Gram(s) IV Push once  enalapril 20 milliGRAM(s) Oral daily  enoxaparin Injectable 30 milliGRAM(s) SubCutaneous every 24 hours  insulin lispro (HumaLOG) corrective regimen sliding scale   SubCutaneous three times a day before meals  latanoprost 0.005% Ophthalmic Solution 1 Drop(s) Both EYES at bedtime  metoprolol tartrate 12.5 milliGRAM(s) Oral two times a day  mirtazapine 15 milliGRAM(s) Oral at bedtime  multivitamin 1 Tablet(s) Oral daily  pantoprazole   Suspension 40 milliGRAM(s) Oral daily    MEDICATIONS  (PRN):  acetaminophen  Suppository .. 650 milliGRAM(s) Rectal every 6 hours PRN Temp greater or equal to 38C (100.4F), Mild Pain (1 - 3)  dextrose 40% Gel 15 Gram(s) Oral once PRN Blood Glucose LESS THAN 70 milliGRAM(s)/deciliter  glucagon  Injectable 1 milliGRAM(s) IntraMuscular once PRN Glucose LESS THAN 70 milligrams/deciliter    LABS:                        12.3   5.07  )-----------( 104      ( 18 Dec 2018 07:55 )             38.6     12-18    148<H>  |  115<H>  |  34<H>  ----------------------------<  84  3.9   |  22  |  0.86    Ca    9.0      18 Dec 2018 07:55  Mg     2.1     12-17    TPro  6.5  /  Alb  2.4<L>  /  TBili  0.4  /  DBili  x   /  AST  119<H>  /  ALT  76  /  AlkPhos  144<H>  12-17    ASSESSMENT AND PLAN:  ·	Acute Respiratory distress.  ·	Hypoxia.  ·	Para Influenza Tracheobronchitis.  ·	Acute kidney Injury.  ·	CAD history.  ·	CHF history.  ·	HTN.  ·	DM  ·	Dementia.    Pulmonary status close to base line. SPO2 95% on room air at rest per RN.  Continue PRN nebulizer.

## 2018-12-18 NOTE — DIETITIAN INITIAL EVALUATION ADULT. - DIET TYPE
consistent carbohydrate (evening snack)/low sodium/dysphagia 1, pureed, honey consistency fluid/since 12/15

## 2018-12-18 NOTE — SWALLOW BEDSIDE ASSESSMENT ADULT - SWALLOW EVAL: DIAGNOSIS
pt presented oropharyngeal dysphagia marked by reduced cognition, decreased labial seal to spoon, slightly increased oral transport time- at times oral holding, suspect timely swallow with reduced laryngeal elevation. inconsistent cough with honey thick liquid bu unclear if related to congestion

## 2018-12-18 NOTE — DISCHARGE NOTE ADULT - ADDITIONAL INSTRUCTIONS
Abnormal LFTs - improved/resolved.  Acute hepatitis panel positive for hepatitis B core IgM antibody, surface antigen negative, surface antibody pending.  RUQ sonogram results as below.    Diet:  pureed with honey thick fluids.

## 2018-12-18 NOTE — CHART NOTE - NSCHARTNOTEFT_GEN_A_CORE
Upon Nutritional Assessment by the Registered Dietitian your patient was determined to meet criteria / has evidence of the following diagnosis/diagnoses:          [ ]  Mild Protein Calorie Malnutrition        [ ]  Moderate Protein Calorie Malnutrition        [x] Severe Protein Calorie Malnutrition        [ ] Unspecified Protein Calorie Malnutrition        [x] Underweight / BMI <19        [ ] Morbid Obesity / BMI > 40      Findings as based on:  •  Comprehensive nutrition assessment and consultation  •  Calorie counts (nutrient intake analysis)  •  Food acceptance and intake status from observations by staff  •  Follow up  •  Patient education  •  Intervention secondary to interdisciplinary rounds  •   concerns      Treatment:    The following diet has been recommended:  Dysphagia 1 Pureed-Honey Consistency, Consistent Carbohydrate (with evening snack), Low Sodium diet + Ensure Pudding 2x daily (340 kcals & 8 gm protein) & NSA Magic Cup 2x daily (520 kcals & 18 gm protein) + 1 packet Bennie daily (80 kcals & 14 g amino acids)    PROVIDER Section:     By signing this assessment you are acknowledging and agree with the diagnosis/diagnoses assigned by the Registered Dietitian    Comments:

## 2018-12-18 NOTE — PHYSICAL THERAPY INITIAL EVALUATION ADULT - CRITERIA FOR SKILLED THERAPEUTIC INTERVENTIONS
functional limitations in following categories/anticipated discharge recommendation/predicted duration of therapy intervention/rehab potential/therapy frequency/risk reduction/prevention/impairments found

## 2018-12-18 NOTE — DISCHARGE NOTE ADULT - HOSPITAL COURSE
Patient had CXR which showed no infiltrate or acute disease.  She was started empirically on antibiotics.  Parainfluenza returned as positive.  Patient treated with nebulizers by pulmonology and antibiotics were stopped.  Patient improved and did not require oxygen at time of discharge (94% on RA).      Echo showed EF 45-50%.  Enalapril continued.  Metoprolol 12.5 mg BID started.  Patient without evidence of acute CHF.      Transaminase mildly elevated on admission, patient without pain or fever.  RUQ with cholelithiasis and no evidence of cholecystitis.  Acute hepatitis panel with positive hepatitis B core Ab IgM, negative surface antigen, and pending surface antibody.  This was communicated to patient's accepting facility.      Speech therapist evaluated patient and recommend to continue outpatient pureed diet but modify fluid to honey thick.  Patient with elevated serum sodium, likely dehydration from poor food/fluid intake due to recent upper respiratory infection.  Patient's accepting facility advised to administer free water (honey thick) at least 1.5 liters per 24 hours and repeat serum sodium in 1-2 days.    Discharge plan and hospital course reviewed with patient's legal guardian at bedside.    Vital Signs Last 24 Hrs  T(C): 37.3 (18 Dec 2018 05:21), Max: 37.3 (18 Dec 2018 05:21)  T(F): 99.2 (18 Dec 2018 05:21), Max: 99.2 (18 Dec 2018 05:21)  HR: 75 (18 Dec 2018 14:10) (75 - 105)  BP: 139/61 (18 Dec 2018 14:10) (130/62 - 154/60)  BP(mean): --  RR: 18 (18 Dec 2018 14:10) (17 - 18)  SpO2: 95% (18 Dec 2018 14:10) (94% - 97%)    GENERAL: NAD, thin appearing elderly female.  Awake and pleasantly confused.  CHEST/LUNG: Clear to auscultation anteriorly, diminished at bases.  Respiratory effort does not appear labored.  HEART: Regular rate and rhythm; S1 and S2,  no murmurs, rubs, or gallops.  ABDOMEN: Soft, not tender to palpation.  No masses or HSM appreciated.  No distension.  Bowel sounds present.  EXTREMITIES:  No clubbing, cyanosis, or edema.  Unable to assess movement, not cooperative.  SKIN: No obvious rashes or lesions.  Turgor decreased. Patient had CXR which showed no infiltrate or acute disease.  She was started empirically on antibiotics.  Parainfluenza returned as positive.  Patient treated with nebulizers by pulmonology and antibiotics were stopped.  Patient improved and did not require oxygen at time of discharge (94% on RA).      Echo showed EF 45-50%.  Enalapril continued.  Metoprolol 12.5 mg BID started.  Patient without evidence of acute CHF.      Transaminase mildly elevated on admission, patient without pain or fever.  RUQ with cholelithiasis and no evidence of cholecystitis.  Acute hepatitis panel with positive hepatitis B core Ab IgM, negative surface antigen, and pending surface antibody.  This was communicated to patient's accepting facility.      Speech therapist evaluated patient and recommend to continue outpatient pureed diet but modify fluid to honey thick.  Patient with elevated serum sodium, likely dehydration from poor food/fluid intake due to recent upper respiratory infection.  Patient's accepting facility advised to administer free water (honey thick) at least 1.5 liters per 24 hours and repeat serum sodium in 1-2 days.    Discharge plan and hospital course reviewed with patient's legal guardian at bedside.  Time spent was 35 to 40 minutes in discharge management.    Vital Signs Last 24 Hrs  T(C): 37.3 (18 Dec 2018 05:21), Max: 37.3 (18 Dec 2018 05:21)  T(F): 99.2 (18 Dec 2018 05:21), Max: 99.2 (18 Dec 2018 05:21)  HR: 75 (18 Dec 2018 14:10) (75 - 105)  BP: 139/61 (18 Dec 2018 14:10) (130/62 - 154/60)  BP(mean): --  RR: 18 (18 Dec 2018 14:10) (17 - 18)  SpO2: 95% (18 Dec 2018 14:10) (94% - 97%)    GENERAL: NAD, thin appearing elderly female.  Awake and pleasantly confused.  CHEST/LUNG: Clear to auscultation anteriorly, diminished at bases.  Respiratory effort does not appear labored.  HEART: Regular rate and rhythm; S1 and S2,  no murmurs, rubs, or gallops.  ABDOMEN: Soft, not tender to palpation.  No masses or HSM appreciated.  No distension.  Bowel sounds present.  EXTREMITIES:  No clubbing, cyanosis, or edema.    SKIN: No obvious rashes or lesions.  Turgor decreased.

## 2018-12-18 NOTE — DISCHARGE NOTE ADULT - PROVIDER TOKENS
FREE:[LAST:[Primary care provider],PHONE:[(   )    -],FAX:[(   )    -],ADDRESS:[Follow-up with primary care provider within one week.]]

## 2018-12-19 ENCOUNTER — EMERGENCY (EMERGENCY)
Facility: HOSPITAL | Age: 83
LOS: 0 days | Discharge: NURSING, REHAB OR LONG-TERM CARE | End: 2018-12-19
Attending: STUDENT IN AN ORGANIZED HEALTH CARE EDUCATION/TRAINING PROGRAM
Payer: MEDICARE

## 2018-12-19 VITALS
RESPIRATION RATE: 18 BRPM | SYSTOLIC BLOOD PRESSURE: 144 MMHG | TEMPERATURE: 99 F | DIASTOLIC BLOOD PRESSURE: 72 MMHG | OXYGEN SATURATION: 96 % | HEART RATE: 89 BPM

## 2018-12-19 VITALS
WEIGHT: 110.01 LBS | TEMPERATURE: 99 F | HEIGHT: 64 IN | DIASTOLIC BLOOD PRESSURE: 72 MMHG | SYSTOLIC BLOOD PRESSURE: 136 MMHG | OXYGEN SATURATION: 96 % | HEART RATE: 86 BPM | RESPIRATION RATE: 19 BRPM

## 2018-12-19 VITALS — OXYGEN SATURATION: 96 %

## 2018-12-19 DIAGNOSIS — Z71.1 PERSON WITH FEARED HEALTH COMPLAINT IN WHOM NO DIAGNOSIS IS MADE: ICD-10-CM

## 2018-12-19 DIAGNOSIS — Z99.81 DEPENDENCE ON SUPPLEMENTAL OXYGEN: ICD-10-CM

## 2018-12-19 LAB
ANION GAP SERPL CALC-SCNC: 10 MMOL/L — SIGNIFICANT CHANGE UP (ref 5–17)
BUN SERPL-MCNC: 30 MG/DL — HIGH (ref 7–23)
CALCIUM SERPL-MCNC: 9 MG/DL — SIGNIFICANT CHANGE UP (ref 8.5–10.1)
CHLORIDE SERPL-SCNC: 114 MMOL/L — HIGH (ref 96–108)
CO2 SERPL-SCNC: 24 MMOL/L — SIGNIFICANT CHANGE UP (ref 22–31)
CREAT SERPL-MCNC: 0.76 MG/DL — SIGNIFICANT CHANGE UP (ref 0.5–1.3)
GLUCOSE BLDC GLUCOMTR-MCNC: 124 MG/DL — HIGH (ref 70–99)
GLUCOSE BLDC GLUCOMTR-MCNC: 125 MG/DL — HIGH (ref 70–99)
GLUCOSE BLDC GLUCOMTR-MCNC: 128 MG/DL — HIGH (ref 70–99)
GLUCOSE SERPL-MCNC: 123 MG/DL — HIGH (ref 70–99)
POTASSIUM SERPL-MCNC: 3.8 MMOL/L — SIGNIFICANT CHANGE UP (ref 3.5–5.3)
POTASSIUM SERPL-SCNC: 3.8 MMOL/L — SIGNIFICANT CHANGE UP (ref 3.5–5.3)
SODIUM SERPL-SCNC: 148 MMOL/L — HIGH (ref 135–145)

## 2018-12-19 PROCEDURE — 99283 EMERGENCY DEPT VISIT LOW MDM: CPT

## 2018-12-19 PROCEDURE — 71045 X-RAY EXAM CHEST 1 VIEW: CPT | Mod: 26

## 2018-12-19 PROCEDURE — 99232 SBSQ HOSP IP/OBS MODERATE 35: CPT

## 2018-12-19 RX ORDER — AMLODIPINE BESYLATE 2.5 MG/1
1 TABLET ORAL
Qty: 30 | Refills: 0 | OUTPATIENT
Start: 2018-12-19 | End: 2019-01-17

## 2018-12-19 RX ADMIN — Medication 81 MILLIGRAM(S): at 11:36

## 2018-12-19 RX ADMIN — ENOXAPARIN SODIUM 30 MILLIGRAM(S): 100 INJECTION SUBCUTANEOUS at 11:35

## 2018-12-19 RX ADMIN — Medication 3 MILLILITER(S): at 05:22

## 2018-12-19 RX ADMIN — AMLODIPINE BESYLATE 5 MILLIGRAM(S): 2.5 TABLET ORAL at 06:02

## 2018-12-19 RX ADMIN — Medication 3 MILLILITER(S): at 11:04

## 2018-12-19 RX ADMIN — Medication 1 TABLET(S): at 11:36

## 2018-12-19 RX ADMIN — Medication 3 MILLILITER(S): at 00:13

## 2018-12-19 RX ADMIN — PANTOPRAZOLE SODIUM 40 MILLIGRAM(S): 20 TABLET, DELAYED RELEASE ORAL at 11:35

## 2018-12-19 RX ADMIN — Medication 12.5 MILLIGRAM(S): at 06:02

## 2018-12-19 RX ADMIN — Medication 20 MILLIGRAM(S): at 06:02

## 2018-12-19 NOTE — PROGRESS NOTE ADULT - REASON FOR ADMISSION
respiratory distress

## 2018-12-19 NOTE — ED PROVIDER NOTE - MEDICAL DECISION MAKING DETAILS
pt monitored in the ER, her oxygen saturation on room air maintained 94-96%, her chest xray is clear, dr mendoza did call in and was consulted, he was made aware and agrees to discharge if chest xray and oxygen remain normal, pt did have a full workup prior to her discharge

## 2018-12-19 NOTE — ED PROVIDER NOTE - CONSTITUTIONAL, MLM
normal... well nourished, awake, alert, oriented to person, place, time/situation and in no apparent distress, pt is sleeping and easily arousable

## 2018-12-19 NOTE — PROGRESS NOTE ADULT - SUBJECTIVE AND OBJECTIVE BOX
Patient is a 89y old  Female who presents with a chief complaint of respiratory distress (18 Dec 2018 19:37)      INTERVAL HPI/OVERNIGHT EVENTS:      REVIEW OF SYSTEMS:    MEDICATIONS  (STANDING):  ALBUTerol/ipratropium for Nebulization 3 milliLiter(s) Nebulizer every 6 hours  amLODIPine   Tablet 5 milliGRAM(s) Oral daily  aspirin  chewable 81 milliGRAM(s) Oral daily  dextrose 5%. 1000 milliLiter(s) (50 mL/Hr) IV Continuous <Continuous>  dextrose 50% Injectable 12.5 Gram(s) IV Push once  dextrose 50% Injectable 25 Gram(s) IV Push once  dextrose 50% Injectable 25 Gram(s) IV Push once  enalapril 20 milliGRAM(s) Oral daily  enoxaparin Injectable 30 milliGRAM(s) SubCutaneous every 24 hours  insulin lispro (HumaLOG) corrective regimen sliding scale   SubCutaneous three times a day before meals  latanoprost 0.005% Ophthalmic Solution 1 Drop(s) Both EYES at bedtime  metoprolol tartrate 12.5 milliGRAM(s) Oral two times a day  mirtazapine 15 milliGRAM(s) Oral at bedtime  multivitamin 1 Tablet(s) Oral daily  pantoprazole   Suspension 40 milliGRAM(s) Oral daily    MEDICATIONS  (PRN):  acetaminophen  Suppository .. 650 milliGRAM(s) Rectal every 6 hours PRN Temp greater or equal to 38C (100.4F), Mild Pain (1 - 3)  dextrose 40% Gel 15 Gram(s) Oral once PRN Blood Glucose LESS THAN 70 milliGRAM(s)/deciliter  glucagon  Injectable 1 milliGRAM(s) IntraMuscular once PRN Glucose LESS THAN 70 milligrams/deciliter      Allergies    PC Pen VK (Swelling)  penicillin (Unknown)    Intolerances        Vital Signs Last 24 Hrs  T(C): 37.6 (19 Dec 2018 04:54), Max: 37.6 (19 Dec 2018 04:54)  T(F): 99.6 (19 Dec 2018 04:54), Max: 99.6 (19 Dec 2018 04:54)  HR: 74 (19 Dec 2018 05:23) (69 - 82)  BP: 155/83 (19 Dec 2018 04:54) (130/75 - 178/85)  BP(mean): --  RR: 18 (19 Dec 2018 04:54) (18 - 19)  SpO2: 97% (19 Dec 2018 05:23) (93% - 97%)    PHYSICAL EXAM:  GENERAL: NAD.  CHEST/LUNG: Clear to auscultation; No rales, rhonchi, or wheezing.  Respiratory effort does not appear labored.  HEART: Regular rate and rhythm; S1 and S2,  no murmurs, rubs, or gallops.  ABDOMEN: Soft, not tender to palpation.  No masses or HSM appreciated.  No distension.  Bowel sounds present.  EXTREMITIES:  No clubbing, cyanosis, or edema.  Moves all extremities with strength 5/5.  SKIN: No obvious rashes or lesions.  Turgor okay.  NEURO:  Alert and oriented x 3, no focal sensory or  motor deficit, DTR 2+ bilaterally.    LABS:                        12.3   5.07  )-----------( 104      ( 18 Dec 2018 07:55 )             38.6     12-19    148<H>  |  114<H>  |  30<H>  ----------------------------<  123<H>  3.8   |  24  |  0.76    Ca    9.0      19 Dec 2018 08:04          CAPILLARY BLOOD GLUCOSE      POCT Blood Glucose.: 125 mg/dL (19 Dec 2018 08:20)  POCT Blood Glucose.: 136 mg/dL (18 Dec 2018 22:55)  POCT Blood Glucose.: 121 mg/dL (18 Dec 2018 16:00)  POCT Blood Glucose.: 104 mg/dL (18 Dec 2018 11:43)      Culture - Urine (collected 15 Dec 2018 11:23)  Source: .Urine Clean Catch (Midstream)  Final Report (16 Dec 2018 10:58):    No growth    Culture - Blood (collected 15 Dec 2018 10:42)  Source: .Blood Blood-Peripheral  Preliminary Report (16 Dec 2018 11:01):    No growth to date.    Culture - Blood (collected 15 Dec 2018 10:41)  Source: .Blood Blood-Peripheral  Preliminary Report (16 Dec 2018 11:01):    No growth to date. Patient is a 89y old  Female who presents with a chief complaint of respiratory distress (18 Dec 2018 19:37)      INTERVAL HPI/OVERNIGHT EVENTS:    Patient was medically discharged yesterday but transport cancelled due elevated blood pressure.  Amlodipine started last evening.  Patient awake, no obvious distress, follows commands.  BP improved.    REVIEW OF SYSTEMS:  Denied nausea or vomiting.  Denied acute pain.    MEDICATIONS  (STANDING):  ALBUTerol/ipratropium for Nebulization 3 milliLiter(s) Nebulizer every 6 hours  amLODIPine   Tablet 5 milliGRAM(s) Oral daily  aspirin  chewable 81 milliGRAM(s) Oral daily  dextrose 5%. 1000 milliLiter(s) (50 mL/Hr) IV Continuous <Continuous>  dextrose 50% Injectable 12.5 Gram(s) IV Push once  dextrose 50% Injectable 25 Gram(s) IV Push once  dextrose 50% Injectable 25 Gram(s) IV Push once  enalapril 20 milliGRAM(s) Oral daily  enoxaparin Injectable 30 milliGRAM(s) SubCutaneous every 24 hours  insulin lispro (HumaLOG) corrective regimen sliding scale   SubCutaneous three times a day before meals  latanoprost 0.005% Ophthalmic Solution 1 Drop(s) Both EYES at bedtime  metoprolol tartrate 12.5 milliGRAM(s) Oral two times a day  mirtazapine 15 milliGRAM(s) Oral at bedtime  multivitamin 1 Tablet(s) Oral daily  pantoprazole   Suspension 40 milliGRAM(s) Oral daily    MEDICATIONS  (PRN):  acetaminophen  Suppository .. 650 milliGRAM(s) Rectal every 6 hours PRN Temp greater or equal to 38C (100.4F), Mild Pain (1 - 3)  dextrose 40% Gel 15 Gram(s) Oral once PRN Blood Glucose LESS THAN 70 milliGRAM(s)/deciliter  glucagon  Injectable 1 milliGRAM(s) IntraMuscular once PRN Glucose LESS THAN 70 milligrams/deciliter      Allergies    PC Pen VK (Swelling)  penicillin (Unknown)    Intolerances    Vital Signs Last 24 Hrs  T(C): 37.6 (19 Dec 2018 04:54), Max: 37.6 (19 Dec 2018 04:54)  T(F): 99.6 (19 Dec 2018 04:54), Max: 99.6 (19 Dec 2018 04:54)  HR: 74 (19 Dec 2018 05:23) (69 - 82)  BP: 155/83 (19 Dec 2018 04:54) (130/75 - 178/85)  BP(mean): --  RR: 18 (19 Dec 2018 04:54) (18 - 19)  SpO2: 97% (19 Dec 2018 05:23) (93% - 97%)    PHYSICAL EXAM:  GENERAL: NAD, thin appearing elderly female.  Awake and pleasantly confused.  CHEST/LUNG: Clear to auscultation anteriorly, diminished at bases.  Respiratory effort does not appear labored.  HEART: Regular rate and rhythm; S1 and S2,  no murmurs, rubs, or gallops.  ABDOMEN: Soft, not tender to palpation.  No masses or HSM appreciated.  No distension.  Bowel sounds present.  EXTREMITIES:  No clubbing, cyanosis, or edema.    SKIN: No obvious rashes or lesions.  Turgor decreased.      LABS:                        12.3   5.07  )-----------( 104      ( 18 Dec 2018 07:55 )             38.6     12-19    148<H>  |  114<H>  |  30<H>  ----------------------------<  123<H>  3.8   |  24  |  0.76    Ca    9.0      19 Dec 2018 08:04          CAPILLARY BLOOD GLUCOSE      POCT Blood Glucose.: 125 mg/dL (19 Dec 2018 08:20)  POCT Blood Glucose.: 136 mg/dL (18 Dec 2018 22:55)  POCT Blood Glucose.: 121 mg/dL (18 Dec 2018 16:00)  POCT Blood Glucose.: 104 mg/dL (18 Dec 2018 11:43)      Culture - Urine (collected 15 Dec 2018 11:23)  Source: .Urine Clean Catch (Midstream)  Final Report (16 Dec 2018 10:58):    No growth    Culture - Blood (collected 15 Dec 2018 10:42)  Source: .Blood Blood-Peripheral  Preliminary Report (16 Dec 2018 11:01):    No growth to date.    Culture - Blood (collected 15 Dec 2018 10:41)  Source: .Blood Blood-Peripheral  Preliminary Report (16 Dec 2018 11:01):    No growth to date. Patient is a 89y old  Female who presents with a chief complaint of respiratory distress (18 Dec 2018 19:37)      INTERVAL HPI/OVERNIGHT EVENTS:    Patient was medically discharged yesterday but transporter cancelled due elevated blood pressure of 160/70.  Amlodipine started last evening.  Patient awake, no obvious distress, follows commands.  BP improved.  Oxygen saturation 97% on room air.    REVIEW OF SYSTEMS:  Denied nausea or vomiting.  Denied acute pain.    MEDICATIONS  (STANDING):  ALBUTerol/ipratropium for Nebulization 3 milliLiter(s) Nebulizer every 6 hours  amLODIPine   Tablet 5 milliGRAM(s) Oral daily  aspirin  chewable 81 milliGRAM(s) Oral daily  dextrose 5%. 1000 milliLiter(s) (50 mL/Hr) IV Continuous <Continuous>  dextrose 50% Injectable 12.5 Gram(s) IV Push once  dextrose 50% Injectable 25 Gram(s) IV Push once  dextrose 50% Injectable 25 Gram(s) IV Push once  enalapril 20 milliGRAM(s) Oral daily  enoxaparin Injectable 30 milliGRAM(s) SubCutaneous every 24 hours  insulin lispro (HumaLOG) corrective regimen sliding scale   SubCutaneous three times a day before meals  latanoprost 0.005% Ophthalmic Solution 1 Drop(s) Both EYES at bedtime  metoprolol tartrate 12.5 milliGRAM(s) Oral two times a day  mirtazapine 15 milliGRAM(s) Oral at bedtime  multivitamin 1 Tablet(s) Oral daily  pantoprazole   Suspension 40 milliGRAM(s) Oral daily    MEDICATIONS  (PRN):  acetaminophen  Suppository .. 650 milliGRAM(s) Rectal every 6 hours PRN Temp greater or equal to 38C (100.4F), Mild Pain (1 - 3)  dextrose 40% Gel 15 Gram(s) Oral once PRN Blood Glucose LESS THAN 70 milliGRAM(s)/deciliter  glucagon  Injectable 1 milliGRAM(s) IntraMuscular once PRN Glucose LESS THAN 70 milligrams/deciliter      Allergies    PC Pen VK (Swelling)  penicillin (Unknown)    Intolerances    Vital Signs Last 24 Hrs  T(C): 37.6 (19 Dec 2018 04:54), Max: 37.6 (19 Dec 2018 04:54)  T(F): 99.6 (19 Dec 2018 04:54), Max: 99.6 (19 Dec 2018 04:54)  HR: 74 (19 Dec 2018 05:23) (69 - 82)  BP: 155/83 (19 Dec 2018 04:54) (130/75 - 178/85)  BP(mean): --  RR: 18 (19 Dec 2018 04:54) (18 - 19)  SpO2: 97% (19 Dec 2018 05:23) (93% - 97%)    PHYSICAL EXAM:  GENERAL: NAD, thin appearing elderly female.  Awake and pleasantly confused.  CHEST/LUNG: Clear to auscultation anteriorly, diminished at bases.  Respiratory effort does not appear labored.  HEART: Regular rate and rhythm; S1 and S2,  no murmurs, rubs, or gallops.  ABDOMEN: Soft, not tender to palpation.  No masses or HSM appreciated.  No distension.  Bowel sounds present.  EXTREMITIES:  No clubbing, cyanosis, or edema.    SKIN: No obvious rashes or lesions.  Turgor decreased.      LABS:                        12.3   5.07  )-----------( 104      ( 18 Dec 2018 07:55 )             38.6     12-19    148<H>  |  114<H>  |  30<H>  ----------------------------<  123<H>  3.8   |  24  |  0.76    Ca    9.0      19 Dec 2018 08:04          CAPILLARY BLOOD GLUCOSE      POCT Blood Glucose.: 125 mg/dL (19 Dec 2018 08:20)  POCT Blood Glucose.: 136 mg/dL (18 Dec 2018 22:55)  POCT Blood Glucose.: 121 mg/dL (18 Dec 2018 16:00)  POCT Blood Glucose.: 104 mg/dL (18 Dec 2018 11:43)      Culture - Urine (collected 15 Dec 2018 11:23)  Source: .Urine Clean Catch (Midstream)  Final Report (16 Dec 2018 10:58):    No growth    Culture - Blood (collected 15 Dec 2018 10:42)  Source: .Blood Blood-Peripheral  Preliminary Report (16 Dec 2018 11:01):    No growth to date.    Culture - Blood (collected 15 Dec 2018 10:41)  Source: .Blood Blood-Peripheral  Preliminary Report (16 Dec 2018 11:01):    No growth to date.

## 2018-12-19 NOTE — ED PROVIDER NOTE - OBJECTIVE STATEMENT
89 year old female presents today bibmarquita from [facility] returning from being discharged from the hospital today, pt was sent home from the hospital with nasal cannula, she was sent back because they do not have oxygen at their facility, pt did not require oxygen on discharge, call received by the hospitalist dr mendoza who reported that the pt was worked up and seen by pulmonary, pt does not require oxygen

## 2018-12-19 NOTE — PROGRESS NOTE ADULT - SUBJECTIVE AND OBJECTIVE BOX
INTERVAL HPI:  89 y/o female w/advanced Dementia, Anxiety, lower back pain,CAD, CHF, LBBB,  Glaucoma, DM2 HTN, OA and Macular degeneration sent from NH for respiratory distress and desaturating into mid 80's.  Pt was reported to have non productive cough for 3-4 days and low grade temps and sent in for evaluation.  pt unable to contribute.   In hospital with temperature of 102 and hypoxia. not able to provide history.    OVERNIGHT EVENTS:   Resting comfortably. No distress.    Vital Signs Last 24 Hrs  T(C): 36.9 (19 Dec 2018 11:26), Max: 37.6 (19 Dec 2018 04:54)  T(F): 98.4 (19 Dec 2018 11:26), Max: 99.6 (19 Dec 2018 04:54)  HR: 71 (19 Dec 2018 12:11) (69 - 82)  BP: 175/70 (19 Dec 2018 11:26) (130/75 - 178/85)  BP(mean): --  RR: 18 (19 Dec 2018 11:26) (18 - 19)  SpO2: 96% (19 Dec 2018 12:11) (93% - 97%)    PHYSICAL EXAM:  GEN:        Resting, and comfortable.  HEENT:    Normal.    RESP:        no distress.  CVS:          Regular rate and rhythm.   ABD:         Soft, non-tender, non-distended;     MEDICATIONS  (STANDING):  ALBUTerol/ipratropium for Nebulization 3 milliLiter(s) Nebulizer every 6 hours  amLODIPine   Tablet 5 milliGRAM(s) Oral daily  aspirin  chewable 81 milliGRAM(s) Oral daily  dextrose 5%. 1000 milliLiter(s) (50 mL/Hr) IV Continuous <Continuous>  dextrose 50% Injectable 12.5 Gram(s) IV Push once  dextrose 50% Injectable 25 Gram(s) IV Push once  dextrose 50% Injectable 25 Gram(s) IV Push once  enalapril 20 milliGRAM(s) Oral daily  enoxaparin Injectable 30 milliGRAM(s) SubCutaneous every 24 hours  insulin lispro (HumaLOG) corrective regimen sliding scale   SubCutaneous three times a day before meals  latanoprost 0.005% Ophthalmic Solution 1 Drop(s) Both EYES at bedtime  metoprolol tartrate 12.5 milliGRAM(s) Oral two times a day  mirtazapine 15 milliGRAM(s) Oral at bedtime  multivitamin 1 Tablet(s) Oral daily  pantoprazole   Suspension 40 milliGRAM(s) Oral daily    MEDICATIONS  (PRN):  acetaminophen  Suppository .. 650 milliGRAM(s) Rectal every 6 hours PRN Temp greater or equal to 38C (100.4F), Mild Pain (1 - 3)  dextrose 40% Gel 15 Gram(s) Oral once PRN Blood Glucose LESS THAN 70 milliGRAM(s)/deciliter  glucagon  Injectable 1 milliGRAM(s) IntraMuscular once PRN Glucose LESS THAN 70 milligrams/deciliter    LABS:                        12.3   5.07  )-----------( 104      ( 18 Dec 2018 07:55 )             38.6     12-19    148<H>  |  114<H>  |  30<H>  ----------------------------<  123<H>  3.8   |  24  |  0.76    Ca    9.0      19 Dec 2018 08:04    ASSESSMENT AND PLAN:  ·	Acute Respiratory distress.  ·	Hypoxia.  ·	Para Influenza Tracheobronchitis.  ·	Acute kidney Injury.  ·	CAD history.  ·	CHF history.  ·	HTN.  ·	DM  ·	Dementia.    Pulmonary status close to base line. SPO2 95% on room air at rest per RN.  Continue PRN nebulizer. For discharge back to Nursing home.

## 2018-12-19 NOTE — ED ADULT NURSE REASSESSMENT NOTE - NS ED NURSE REASSESS COMMENT FT1
pt disch and sent back because received in nsg home on O2 and they don't keep pts on O2 facility aware by soc work that pt doesn't need o2. Plan to send back pending

## 2018-12-19 NOTE — PROGRESS NOTE ADULT - ASSESSMENT
1.  Acute respiratory failure with hypoxia - resolved.  2.  Viral bronchitis.  3.  Chronic systolic heart failure.  4.  Chronic dysphagia.    Discharge to previous living facility when bed available.  Updated medication reconciliation with amlodipine. 1.  Acute respiratory failure with hypoxia - resolved.  2.  Viral bronchitis.  3.  Chronic systolic heart failure.  4.  Chronic dysphagia.  5.  HTN.    Discharge to previous living facility when bed available.  Updated medication reconciliation with amlodipine.  No oxygen needed.

## 2018-12-19 NOTE — ED ADULT TRIAGE NOTE - HEIGHT IN CM
Clinic Care Coordination Contact  [facility]/Voicemail    Referral Source: [facility]  Clinical Data: Care Coordinator Outreach  Outreach attempted x 2.  Left message on voicemail with call back information and requested return call.  Plan: Care Coordinator will try to reach patient again in 1-2 business days.      Farzad Bagley RN/CC  Care Coordinator [facility] [address]  [phone]         [phone]

## 2018-12-19 NOTE — ED ADULT TRIAGE NOTE - CHIEF COMPLAINT QUOTE
patient BIBA , as [facility] patient was discharge from hospital 1 hour ago on Oxygen , but the facility has no oxygen for the patient patient nonverbal

## 2018-12-19 NOTE — ED PROVIDER NOTE - PROGRESS NOTE DETAILS
in the ER assisting with disposition back to the assisted living facility dr mendoza called, pt has been observed while in the ER, her oxygen saturation  has manintained 94-95%, dr mendoza requests to have a chest xray done oxygen saturation is 96%, chest xray is negative,  made aware, transportation arrangements will be made to send her back to the facility

## 2018-12-20 LAB
CULTURE RESULTS: SIGNIFICANT CHANGE UP
CULTURE RESULTS: SIGNIFICANT CHANGE UP
SPECIMEN SOURCE: SIGNIFICANT CHANGE UP
SPECIMEN SOURCE: SIGNIFICANT CHANGE UP

## 2018-12-21 ENCOUNTER — INPATIENT (INPATIENT)
Facility: HOSPITAL | Age: 83
LOS: 2 days | Discharge: NURSING, REHAB OR LONG-TERM CARE | End: 2018-12-24
Attending: GENERAL ACUTE CARE HOSPITAL | Admitting: GENERAL ACUTE CARE HOSPITAL
Payer: MEDICARE

## 2018-12-21 VITALS
DIASTOLIC BLOOD PRESSURE: 66 MMHG | OXYGEN SATURATION: 95 % | HEIGHT: 61 IN | HEART RATE: 101 BPM | RESPIRATION RATE: 22 BRPM | SYSTOLIC BLOOD PRESSURE: 110 MMHG | WEIGHT: 110.01 LBS | TEMPERATURE: 98 F

## 2018-12-21 DIAGNOSIS — J96.01 ACUTE RESPIRATORY FAILURE WITH HYPOXIA: ICD-10-CM

## 2018-12-21 DIAGNOSIS — Z87.891 PERSONAL HISTORY OF NICOTINE DEPENDENCE: ICD-10-CM

## 2018-12-21 DIAGNOSIS — I25.10 ATHEROSCLEROTIC HEART DISEASE OF NATIVE CORONARY ARTERY WITHOUT ANGINA PECTORIS: ICD-10-CM

## 2018-12-21 DIAGNOSIS — R13.10 DYSPHAGIA, UNSPECIFIED: ICD-10-CM

## 2018-12-21 DIAGNOSIS — E86.0 DEHYDRATION: ICD-10-CM

## 2018-12-21 DIAGNOSIS — E87.0 HYPEROSMOLALITY AND HYPERNATREMIA: ICD-10-CM

## 2018-12-21 DIAGNOSIS — I13.0 HYPERTENSIVE HEART AND CHRONIC KIDNEY DISEASE WITH HEART FAILURE AND STAGE 1 THROUGH STAGE 4 CHRONIC KIDNEY DISEASE, OR UNSPECIFIED CHRONIC KIDNEY DISEASE: ICD-10-CM

## 2018-12-21 DIAGNOSIS — E11.22 TYPE 2 DIABETES MELLITUS WITH DIABETIC CHRONIC KIDNEY DISEASE: ICD-10-CM

## 2018-12-21 DIAGNOSIS — F41.9 ANXIETY DISORDER, UNSPECIFIED: ICD-10-CM

## 2018-12-21 DIAGNOSIS — J44.9 CHRONIC OBSTRUCTIVE PULMONARY DISEASE, UNSPECIFIED: ICD-10-CM

## 2018-12-21 DIAGNOSIS — R63.6 UNDERWEIGHT: ICD-10-CM

## 2018-12-21 DIAGNOSIS — N18.3 CHRONIC KIDNEY DISEASE, STAGE 3 (MODERATE): ICD-10-CM

## 2018-12-21 DIAGNOSIS — Z79.82 LONG TERM (CURRENT) USE OF ASPIRIN: ICD-10-CM

## 2018-12-21 DIAGNOSIS — Z90.710 ACQUIRED ABSENCE OF BOTH CERVIX AND UTERUS: ICD-10-CM

## 2018-12-21 DIAGNOSIS — B19.10 UNSPECIFIED VIRAL HEPATITIS B WITHOUT HEPATIC COMA: ICD-10-CM

## 2018-12-21 DIAGNOSIS — F03.90 UNSPECIFIED DEMENTIA WITHOUT BEHAVIORAL DISTURBANCE: ICD-10-CM

## 2018-12-21 DIAGNOSIS — E43 UNSPECIFIED SEVERE PROTEIN-CALORIE MALNUTRITION: ICD-10-CM

## 2018-12-21 DIAGNOSIS — E11.65 TYPE 2 DIABETES MELLITUS WITH HYPERGLYCEMIA: ICD-10-CM

## 2018-12-21 DIAGNOSIS — M19.90 UNSPECIFIED OSTEOARTHRITIS, UNSPECIFIED SITE: ICD-10-CM

## 2018-12-21 DIAGNOSIS — I25.2 OLD MYOCARDIAL INFARCTION: ICD-10-CM

## 2018-12-21 DIAGNOSIS — H35.30 UNSPECIFIED MACULAR DEGENERATION: ICD-10-CM

## 2018-12-21 DIAGNOSIS — R06.03 ACUTE RESPIRATORY DISTRESS: ICD-10-CM

## 2018-12-21 DIAGNOSIS — H40.9 UNSPECIFIED GLAUCOMA: ICD-10-CM

## 2018-12-21 DIAGNOSIS — F32.9 MAJOR DEPRESSIVE DISORDER, SINGLE EPISODE, UNSPECIFIED: ICD-10-CM

## 2018-12-21 DIAGNOSIS — J20.8 ACUTE BRONCHITIS DUE TO OTHER SPECIFIED ORGANISMS: ICD-10-CM

## 2018-12-21 DIAGNOSIS — K21.9 GASTRO-ESOPHAGEAL REFLUX DISEASE WITHOUT ESOPHAGITIS: ICD-10-CM

## 2018-12-21 DIAGNOSIS — I50.22 CHRONIC SYSTOLIC (CONGESTIVE) HEART FAILURE: ICD-10-CM

## 2018-12-21 DIAGNOSIS — Z66 DO NOT RESUSCITATE: ICD-10-CM

## 2018-12-21 DIAGNOSIS — E78.2 MIXED HYPERLIPIDEMIA: ICD-10-CM

## 2018-12-21 DIAGNOSIS — B34.8 OTHER VIRAL INFECTIONS OF UNSPECIFIED SITE: ICD-10-CM

## 2018-12-21 DIAGNOSIS — Z79.1 LONG TERM (CURRENT) USE OF NON-STEROIDAL ANTI-INFLAMMATORIES (NSAID): ICD-10-CM

## 2018-12-21 DIAGNOSIS — Z88.0 ALLERGY STATUS TO PENICILLIN: ICD-10-CM

## 2018-12-21 LAB
ALBUMIN SERPL ELPH-MCNC: 2.9 G/DL — LOW (ref 3.3–5)
ALP SERPL-CCNC: 138 U/L — HIGH (ref 40–120)
ALT FLD-CCNC: 41 U/L — SIGNIFICANT CHANGE UP (ref 12–78)
ANION GAP SERPL CALC-SCNC: 10 MMOL/L — SIGNIFICANT CHANGE UP (ref 5–17)
APPEARANCE UR: CLEAR — SIGNIFICANT CHANGE UP
AST SERPL-CCNC: 40 U/L — HIGH (ref 15–37)
BACTERIA # UR AUTO: ABNORMAL
BASE EXCESS BLDA CALC-SCNC: 0.7 MMOL/L — SIGNIFICANT CHANGE UP (ref -2–2)
BASOPHILS # BLD AUTO: 0.03 K/UL — SIGNIFICANT CHANGE UP (ref 0–0.2)
BASOPHILS NFR BLD AUTO: 0.2 % — SIGNIFICANT CHANGE UP (ref 0–2)
BILIRUB SERPL-MCNC: 0.7 MG/DL — SIGNIFICANT CHANGE UP (ref 0.2–1.2)
BILIRUB UR-MCNC: NEGATIVE — SIGNIFICANT CHANGE UP
BLOOD GAS COMMENTS: SIGNIFICANT CHANGE UP
BLOOD GAS SOURCE: SIGNIFICANT CHANGE UP
BUN SERPL-MCNC: 47 MG/DL — HIGH (ref 7–23)
CALCIUM SERPL-MCNC: 9.7 MG/DL — SIGNIFICANT CHANGE UP (ref 8.5–10.1)
CHLORIDE SERPL-SCNC: 120 MMOL/L — HIGH (ref 96–108)
CO2 SERPL-SCNC: 24 MMOL/L — SIGNIFICANT CHANGE UP (ref 22–31)
COLOR SPEC: YELLOW — SIGNIFICANT CHANGE UP
CREAT SERPL-MCNC: 1.68 MG/DL — HIGH (ref 0.5–1.3)
DIFF PNL FLD: NEGATIVE — SIGNIFICANT CHANGE UP
EOSINOPHIL # BLD AUTO: 0 K/UL — SIGNIFICANT CHANGE UP (ref 0–0.5)
EOSINOPHIL NFR BLD AUTO: 0 % — SIGNIFICANT CHANGE UP (ref 0–6)
EPI CELLS # UR: ABNORMAL
GLUCOSE SERPL-MCNC: 186 MG/DL — HIGH (ref 70–99)
GLUCOSE UR QL: NEGATIVE MG/DL — SIGNIFICANT CHANGE UP
HCO3 BLDA-SCNC: 22 MMOL/L — SIGNIFICANT CHANGE UP (ref 21–29)
HCT VFR BLD CALC: 44.7 % — SIGNIFICANT CHANGE UP (ref 34.5–45)
HGB BLD-MCNC: 14.7 G/DL — SIGNIFICANT CHANGE UP (ref 11.5–15.5)
HOROWITZ INDEX BLDA+IHG-RTO: 32 — SIGNIFICANT CHANGE UP
IMM GRANULOCYTES NFR BLD AUTO: 0.4 % — SIGNIFICANT CHANGE UP (ref 0–1.5)
KETONES UR-MCNC: ABNORMAL
LACTATE SERPL-SCNC: 1.8 MMOL/L — SIGNIFICANT CHANGE UP (ref 0.7–2)
LEUKOCYTE ESTERASE UR-ACNC: ABNORMAL
LYMPHOCYTES # BLD AUTO: 1.56 K/UL — SIGNIFICANT CHANGE UP (ref 1–3.3)
LYMPHOCYTES # BLD AUTO: 11.5 % — LOW (ref 13–44)
MCHC RBC-ENTMCNC: 30 PG — SIGNIFICANT CHANGE UP (ref 27–34)
MCHC RBC-ENTMCNC: 32.9 GM/DL — SIGNIFICANT CHANGE UP (ref 32–36)
MCV RBC AUTO: 91.2 FL — SIGNIFICANT CHANGE UP (ref 80–100)
MONOCYTES # BLD AUTO: 0.78 K/UL — SIGNIFICANT CHANGE UP (ref 0–0.9)
MONOCYTES NFR BLD AUTO: 5.7 % — SIGNIFICANT CHANGE UP (ref 2–14)
NEUTROPHILS # BLD AUTO: 11.14 K/UL — HIGH (ref 1.8–7.4)
NEUTROPHILS NFR BLD AUTO: 82.2 % — HIGH (ref 43–77)
NITRITE UR-MCNC: NEGATIVE — SIGNIFICANT CHANGE UP
NRBC # BLD: 0 /100 WBCS — SIGNIFICANT CHANGE UP (ref 0–0)
NT-PROBNP SERPL-SCNC: 3717 PG/ML — HIGH (ref 0–450)
PCO2 BLDA: 29 MMHG — LOW (ref 32–46)
PH BLD: 7.5 — HIGH (ref 7.35–7.45)
PH UR: 5 — SIGNIFICANT CHANGE UP (ref 5–8)
PLATELET # BLD AUTO: 278 K/UL — SIGNIFICANT CHANGE UP (ref 150–400)
PO2 BLDA: 66 MMHG — LOW (ref 74–108)
POTASSIUM SERPL-MCNC: 3.8 MMOL/L — SIGNIFICANT CHANGE UP (ref 3.5–5.3)
POTASSIUM SERPL-SCNC: 3.8 MMOL/L — SIGNIFICANT CHANGE UP (ref 3.5–5.3)
PROT SERPL-MCNC: 7.9 GM/DL — SIGNIFICANT CHANGE UP (ref 6–8.3)
PROT UR-MCNC: 100 MG/DL
RBC # BLD: 4.9 M/UL — SIGNIFICANT CHANGE UP (ref 3.8–5.2)
RBC # FLD: 13.3 % — SIGNIFICANT CHANGE UP (ref 10.3–14.5)
SAO2 % BLDA: 94 % — SIGNIFICANT CHANGE UP (ref 92–96)
SODIUM SERPL-SCNC: 154 MMOL/L — HIGH (ref 135–145)
SP GR SPEC: 1.01 — SIGNIFICANT CHANGE UP (ref 1.01–1.02)
UROBILINOGEN FLD QL: 1 MG/DL
WBC # BLD: 13.57 K/UL — HIGH (ref 3.8–10.5)
WBC # FLD AUTO: 13.57 K/UL — HIGH (ref 3.8–10.5)
WBC UR QL: ABNORMAL

## 2018-12-21 PROCEDURE — 99284 EMERGENCY DEPT VISIT MOD MDM: CPT

## 2018-12-21 PROCEDURE — 93010 ELECTROCARDIOGRAM REPORT: CPT

## 2018-12-21 PROCEDURE — 71045 X-RAY EXAM CHEST 1 VIEW: CPT | Mod: 26

## 2018-12-21 PROCEDURE — 99223 1ST HOSP IP/OBS HIGH 75: CPT | Mod: AI

## 2018-12-21 RX ORDER — MIRTAZAPINE 45 MG/1
1 TABLET, ORALLY DISINTEGRATING ORAL
Qty: 0 | Refills: 0 | COMMUNITY

## 2018-12-21 RX ORDER — FUROSEMIDE 40 MG
40 TABLET ORAL ONCE
Qty: 0 | Refills: 0 | Status: COMPLETED | OUTPATIENT
Start: 2018-12-21 | End: 2018-12-21

## 2018-12-21 RX ORDER — HYDROXYZINE HCL 10 MG
10 TABLET ORAL
Qty: 0 | Refills: 0 | COMMUNITY

## 2018-12-21 RX ORDER — PANTOPRAZOLE SODIUM 20 MG/1
1 TABLET, DELAYED RELEASE ORAL
Qty: 0 | Refills: 0 | COMMUNITY

## 2018-12-21 RX ORDER — VANCOMYCIN HCL 1 G
1000 VIAL (EA) INTRAVENOUS ONCE
Qty: 0 | Refills: 0 | Status: COMPLETED | OUTPATIENT
Start: 2018-12-21 | End: 2018-12-21

## 2018-12-21 RX ORDER — ASPIRIN/CALCIUM CARB/MAGNESIUM 324 MG
81 TABLET ORAL
Qty: 0 | Refills: 0 | COMMUNITY

## 2018-12-21 RX ORDER — SODIUM CHLORIDE 9 MG/ML
1000 INJECTION, SOLUTION INTRAVENOUS
Qty: 0 | Refills: 0 | Status: DISCONTINUED | OUTPATIENT
Start: 2018-12-21 | End: 2018-12-22

## 2018-12-21 RX ORDER — CELECOXIB 200 MG/1
200 CAPSULE ORAL
Qty: 0 | Refills: 0 | COMMUNITY

## 2018-12-21 RX ORDER — LATANOPROST 0.05 MG/ML
1 SOLUTION/ DROPS OPHTHALMIC; TOPICAL
Qty: 0 | Refills: 0 | COMMUNITY

## 2018-12-21 RX ORDER — AZTREONAM 2 G
2000 VIAL (EA) INJECTION ONCE
Qty: 0 | Refills: 0 | Status: COMPLETED | OUTPATIENT
Start: 2018-12-21 | End: 2018-12-21

## 2018-12-21 RX ADMIN — Medication 40 MILLIGRAM(S): at 21:53

## 2018-12-21 RX ADMIN — Medication 1000 MILLIGRAM(S): at 21:37

## 2018-12-21 RX ADMIN — Medication 2000 MILLIGRAM(S): at 23:41

## 2018-12-21 RX ADMIN — Medication 250 MILLIGRAM(S): at 20:13

## 2018-12-21 RX ADMIN — Medication 100 MILLIGRAM(S): at 21:51

## 2018-12-21 NOTE — H&P ADULT - PROBLEM SELECTOR PLAN 5
pt not on any statin  has elevated lfts chronic, ?weller  on recent US had gallstones pt currenly dehydrated  iv hydration and trend

## 2018-12-21 NOTE — H&P ADULT - HISTORY OF PRESENT ILLNESS
Pt is a 89 y/o female w/advanced dementia, Anxiety, lower back pain,cad, chf, LBBB,  glaucoma, DM2 HTN, CAD, OA and macular degeneration sent from NH for respiratory distress.  Pt was reported to have non productive cough for 3-4 days and low grade temps and sent in for evaluation.  pt unable to contribute. pt was recently admitted to [facility] for similar.  pt febrile in ed prompting for admission. Pt is a 89 y/o female w/advanced dementia, Anxiety, lower back pain,cad, chf, LBBB,  glaucoma, DM2 HTN, CAD, OA and macular degeneration sent from NH for respiratory distress.  Pt was reported to have non productive cough and low grade temps and sent in for evaluation.  pt unable to contribute. pt was recently admitted to [facility] for similar.  pt febrile in ed prompting for admission.

## 2018-12-21 NOTE — ED ADULT NURSE NOTE - INTERVENTIONS DEFINITIONS
Stretcher in lowest position, wheels locked, appropriate side rails in place/Physically safe environment: no spills, clutter or unnecessary equipment

## 2018-12-21 NOTE — H&P ADULT - NSHPLABSRESULTS_GEN_ALL_CORE
LABS:                        14.7   13.57 )-----------( 278      ( 21 Dec 2018 20:02 )             44.7     12-    154<H>  |  120<H>  |  47<H>  ----------------------------<  186<H>  3.8   |  24  |  1.68<H>    Ca    9.7      21 Dec 2018 20:02    TPro  7.9  /  Alb  2.9<L>  /  TBili  0.7  /  DBili  x   /  AST  40<H>  /  ALT  41  /  AlkPhos  138<H>  12-      Urinalysis Basic - ( 21 Dec 2018 20:11 )    Color: Yellow / Appearance: Clear / S.015 / pH: x  Gluc: x / Ketone: Small  / Bili: Negative / Urobili: 1 mg/dL   Blood: x / Protein: 100 mg/dL / Nitrite: Negative   Leuk Esterase: Trace / RBC: x / WBC 6-10   Sq Epi: x / Non Sq Epi: Many / Bacteria: Moderate      CAPILLARY BLOOD GLUCOSE          RADIOLOGY & ADDITIONAL TESTS:    Imaging Personally Reviewed:  [ X] YES  [ ] NO

## 2018-12-21 NOTE — H&P ADULT - NSHPPHYSICALEXAM_GEN_ALL_CORE
Vital Signs Last 24 Hrs  T(C): 38.2 (21 Dec 2018 20:17), Max: 38.2 (21 Dec 2018 20:17)  T(F): 100.7 (21 Dec 2018 20:17), Max: 100.7 (21 Dec 2018 20:17)  HR: 97 (21 Dec 2018 21:57) (97 - 101)  BP: 108/62 (21 Dec 2018 21:57) (100/55 - 110/66)  BP(mean): --  RR: 24 (21 Dec 2018 21:57) (22 - 24)  SpO2: 94% (21 Dec 2018 21:57) (93% - 95%)  GENERAL: elderly female,ill appearing  HEAD:  Atraumatic, Normocephalic  EYES: EOMI, PERRLA, conjunctiva and sclera clear  ENMT: No tonsillar erythema, exudates, or enlargement; dry mucous membranes, No lesions  NECK: Supple, No JVD, Normal thyroid  NERVOUS SYSTEM:  Alert & Oriented X1, Motor Strength 5/5 B/L upper and lower extremities;  CHEST/LUNG:cta b/l   HEART: Regular rate and rhythm; No rubs, or gallops, +S1,S2  ABDOMEN: Soft, Nontender, Nondistended; Bowel sounds present, old surgical scar, reducible ventral hernia  EXTREMITIES:  2+ Peripheral Pulses, No clubbing, cyanosis, or edema  LYMPH: No cervical adenopathy  RECTAL: deferred  BREAST: deferred  : deferred  SKIN: No rashes or lesions    IMPROVE VTE Individual Risk Assessment          RISK                                                          Points  [  ] Previous VTE                                                3  [  ] Thrombophilia                                             2  [  ] Lower limb paralysis                                    2        (unable to hold up >15 seconds)    [  ] Current Cancer                                             2         (within 6 months)  [ x ] Immobilization > 24 hrs                              1  [  ] ICU/CCU stay > 24 hours                            1  [  x] Age > 60                                                    1  IMPROVE VTE Score ___2______

## 2018-12-21 NOTE — H&P ADULT - PROBLEM SELECTOR PROBLEM 6
Chronic congestive heart failure, unspecified congestive heart failure type Type 2 diabetes mellitus with hyperglycemia, without long-term current use of insulin

## 2018-12-21 NOTE — ED ADULT TRIAGE NOTE - CHIEF COMPLAINT QUOTE
ems states, " [facility], has been sick for past week, d/c hospital wednesday, DNR"

## 2018-12-21 NOTE — H&P ADULT - PROBLEM SELECTOR PROBLEM 4
Type 2 diabetes mellitus with hyperglycemia, without long-term current use of insulin Osteoarthritis, unspecified osteoarthritis type, unspecified site

## 2018-12-21 NOTE — ED PROVIDER NOTE - CARE PLAN
Principal Discharge DX:	Acute on chronic congestive heart failure, unspecified heart failure type  Secondary Diagnosis:	SOB (shortness of breath)

## 2018-12-21 NOTE — ED PROVIDER NOTE - OBJECTIVE STATEMENT
89 yo female, DNR presents with difficulty breathing from nursing facility. Patient unable to provide further information.

## 2018-12-21 NOTE — ED ADULT NURSE NOTE - PMH
Age Related Macular Degeneration  2008  Anorexia    Anxiety    Chronic Bronchitis with Emphysema  2009  Congestive Heart Failure  1997, 1999  Dementia    Diabetes Mellitus Type II    Fracture Lumbar Vertebra-Closed  8/2009  Glaucoma  bilateral on medication  HTN (hypertension)    Hyperlipidemia  1997  Left bundle branch block    Macular degeneration    MDD (major depressive disorder)    Myocardial Infarct, Old  1997  Osteoarthritis

## 2018-12-21 NOTE — ED PROVIDER NOTE - PROGRESS NOTE DETAILS
Patient with history of chf Pt satting 93%. Cxr does not show pna, given lasix, admitted for further management.

## 2018-12-21 NOTE — H&P ADULT - ASSESSMENT
89 y/o female w/advanced dementia, Anxiety, lower back pain,cad, chf, LBBB,  glaucoma, DM2 HTN, CAD, OA and macular degeneration sent from NH for sob

## 2018-12-22 DIAGNOSIS — E87.0 HYPEROSMOLALITY AND HYPERNATREMIA: ICD-10-CM

## 2018-12-22 DIAGNOSIS — R50.9 FEVER, UNSPECIFIED: ICD-10-CM

## 2018-12-22 LAB
ANION GAP SERPL CALC-SCNC: 8 MMOL/L — SIGNIFICANT CHANGE UP (ref 5–17)
BUN SERPL-MCNC: 57 MG/DL — HIGH (ref 7–23)
CALCIUM SERPL-MCNC: 9.5 MG/DL — SIGNIFICANT CHANGE UP (ref 8.5–10.1)
CHLORIDE SERPL-SCNC: 119 MMOL/L — HIGH (ref 96–108)
CO2 SERPL-SCNC: 24 MMOL/L — SIGNIFICANT CHANGE UP (ref 22–31)
CREAT SERPL-MCNC: 1.95 MG/DL — HIGH (ref 0.5–1.3)
CULTURE RESULTS: NO GROWTH — SIGNIFICANT CHANGE UP
GLUCOSE BLDC GLUCOMTR-MCNC: 126 MG/DL — HIGH (ref 70–99)
GLUCOSE BLDC GLUCOMTR-MCNC: 133 MG/DL — HIGH (ref 70–99)
GLUCOSE SERPL-MCNC: 154 MG/DL — HIGH (ref 70–99)
GRAM STN FLD: SIGNIFICANT CHANGE UP
HCT VFR BLD CALC: 39.7 % — SIGNIFICANT CHANGE UP (ref 34.5–45)
HGB BLD-MCNC: 13 G/DL — SIGNIFICANT CHANGE UP (ref 11.5–15.5)
MCHC RBC-ENTMCNC: 30 PG — SIGNIFICANT CHANGE UP (ref 27–34)
MCHC RBC-ENTMCNC: 32.7 GM/DL — SIGNIFICANT CHANGE UP (ref 32–36)
MCV RBC AUTO: 91.5 FL — SIGNIFICANT CHANGE UP (ref 80–100)
NRBC # BLD: 0 /100 WBCS — SIGNIFICANT CHANGE UP (ref 0–0)
PLATELET # BLD AUTO: 223 K/UL — SIGNIFICANT CHANGE UP (ref 150–400)
POTASSIUM SERPL-MCNC: 3.5 MMOL/L — SIGNIFICANT CHANGE UP (ref 3.5–5.3)
POTASSIUM SERPL-SCNC: 3.5 MMOL/L — SIGNIFICANT CHANGE UP (ref 3.5–5.3)
RBC # BLD: 4.34 M/UL — SIGNIFICANT CHANGE UP (ref 3.8–5.2)
RBC # FLD: 13.4 % — SIGNIFICANT CHANGE UP (ref 10.3–14.5)
SODIUM SERPL-SCNC: 151 MMOL/L — HIGH (ref 135–145)
SPECIMEN SOURCE: SIGNIFICANT CHANGE UP
SPECIMEN SOURCE: SIGNIFICANT CHANGE UP
WBC # BLD: 13.01 K/UL — HIGH (ref 3.8–10.5)
WBC # FLD AUTO: 13.01 K/UL — HIGH (ref 3.8–10.5)

## 2018-12-22 PROCEDURE — 99233 SBSQ HOSP IP/OBS HIGH 50: CPT

## 2018-12-22 RX ORDER — DEXTROSE 50 % IN WATER 50 %
15 SYRINGE (ML) INTRAVENOUS ONCE
Qty: 0 | Refills: 0 | Status: DISCONTINUED | OUTPATIENT
Start: 2018-12-22 | End: 2018-12-24

## 2018-12-22 RX ORDER — METOPROLOL TARTRATE 50 MG
12.5 TABLET ORAL
Qty: 0 | Refills: 0 | Status: DISCONTINUED | OUTPATIENT
Start: 2018-12-22 | End: 2018-12-24

## 2018-12-22 RX ORDER — SODIUM CHLORIDE 9 MG/ML
1000 INJECTION, SOLUTION INTRAVENOUS
Qty: 0 | Refills: 0 | Status: DISCONTINUED | OUTPATIENT
Start: 2018-12-22 | End: 2018-12-24

## 2018-12-22 RX ORDER — INSULIN LISPRO 100/ML
VIAL (ML) SUBCUTANEOUS
Qty: 0 | Refills: 0 | Status: DISCONTINUED | OUTPATIENT
Start: 2018-12-22 | End: 2018-12-24

## 2018-12-22 RX ORDER — LATANOPROST 0.05 MG/ML
1 SOLUTION/ DROPS OPHTHALMIC; TOPICAL AT BEDTIME
Qty: 0 | Refills: 0 | Status: DISCONTINUED | OUTPATIENT
Start: 2018-12-22 | End: 2018-12-24

## 2018-12-22 RX ORDER — DEXTROSE 50 % IN WATER 50 %
25 SYRINGE (ML) INTRAVENOUS ONCE
Qty: 0 | Refills: 0 | Status: DISCONTINUED | OUTPATIENT
Start: 2018-12-22 | End: 2018-12-24

## 2018-12-22 RX ORDER — VANCOMYCIN HCL 1 G
500 VIAL (EA) INTRAVENOUS EVERY 24 HOURS
Qty: 0 | Refills: 0 | Status: DISCONTINUED | OUTPATIENT
Start: 2018-12-23 | End: 2018-12-22

## 2018-12-22 RX ORDER — AZTREONAM 2 G
500 VIAL (EA) INJECTION EVERY 8 HOURS
Qty: 0 | Refills: 0 | Status: DISCONTINUED | OUTPATIENT
Start: 2018-12-22 | End: 2018-12-22

## 2018-12-22 RX ORDER — ASPIRIN/CALCIUM CARB/MAGNESIUM 324 MG
81 TABLET ORAL DAILY
Qty: 0 | Refills: 0 | Status: DISCONTINUED | OUTPATIENT
Start: 2018-12-22 | End: 2018-12-24

## 2018-12-22 RX ORDER — GLUCAGON INJECTION, SOLUTION 0.5 MG/.1ML
1 INJECTION, SOLUTION SUBCUTANEOUS ONCE
Qty: 0 | Refills: 0 | Status: DISCONTINUED | OUTPATIENT
Start: 2018-12-22 | End: 2018-12-24

## 2018-12-22 RX ORDER — DEXTROSE 50 % IN WATER 50 %
12.5 SYRINGE (ML) INTRAVENOUS ONCE
Qty: 0 | Refills: 0 | Status: DISCONTINUED | OUTPATIENT
Start: 2018-12-22 | End: 2018-12-24

## 2018-12-22 RX ORDER — HEPARIN SODIUM 5000 [USP'U]/ML
5000 INJECTION INTRAVENOUS; SUBCUTANEOUS EVERY 12 HOURS
Qty: 0 | Refills: 0 | Status: DISCONTINUED | OUTPATIENT
Start: 2018-12-22 | End: 2018-12-24

## 2018-12-22 RX ORDER — VANCOMYCIN HCL 1 G
750 VIAL (EA) INTRAVENOUS EVERY 24 HOURS
Qty: 0 | Refills: 0 | Status: DISCONTINUED | OUTPATIENT
Start: 2018-12-22 | End: 2018-12-22

## 2018-12-22 RX ORDER — MIRTAZAPINE 45 MG/1
15 TABLET, ORALLY DISINTEGRATING ORAL AT BEDTIME
Qty: 0 | Refills: 0 | Status: DISCONTINUED | OUTPATIENT
Start: 2018-12-22 | End: 2018-12-24

## 2018-12-22 RX ORDER — AMLODIPINE BESYLATE 2.5 MG/1
5 TABLET ORAL DAILY
Qty: 0 | Refills: 0 | Status: DISCONTINUED | OUTPATIENT
Start: 2018-12-22 | End: 2018-12-24

## 2018-12-22 RX ADMIN — Medication 50 MILLIGRAM(S): at 14:08

## 2018-12-22 RX ADMIN — AMLODIPINE BESYLATE 5 MILLIGRAM(S): 2.5 TABLET ORAL at 06:13

## 2018-12-22 RX ADMIN — Medication 81 MILLIGRAM(S): at 11:45

## 2018-12-22 RX ADMIN — SODIUM CHLORIDE 100 MILLILITER(S): 9 INJECTION, SOLUTION INTRAVENOUS at 00:55

## 2018-12-22 RX ADMIN — Medication 1 TABLET(S): at 11:45

## 2018-12-22 RX ADMIN — LATANOPROST 1 DROP(S): 0.05 SOLUTION/ DROPS OPHTHALMIC; TOPICAL at 23:08

## 2018-12-22 RX ADMIN — Medication 12.5 MILLIGRAM(S): at 06:13

## 2018-12-22 RX ADMIN — MIRTAZAPINE 15 MILLIGRAM(S): 45 TABLET, ORALLY DISINTEGRATING ORAL at 22:18

## 2018-12-22 RX ADMIN — HEPARIN SODIUM 5000 UNIT(S): 5000 INJECTION INTRAVENOUS; SUBCUTANEOUS at 17:31

## 2018-12-22 RX ADMIN — SODIUM CHLORIDE 75 MILLILITER(S): 9 INJECTION, SOLUTION INTRAVENOUS at 17:32

## 2018-12-22 RX ADMIN — Medication 12.5 MILLIGRAM(S): at 17:31

## 2018-12-22 NOTE — PROGRESS NOTE ADULT - SUBJECTIVE AND OBJECTIVE BOX
INTERVAL HPI/OVERNIGHT EVENTS:  Pt seen and examined at bedside.     Allergies/Intolerance: PC Pen VK (Swelling)  penicillin (Unknown)      MEDICATIONS  (STANDING):  amLODIPine   Tablet 5 milliGRAM(s) Oral daily  aspirin enteric coated 81 milliGRAM(s) Oral daily  aztreonam  IVPB 500 milliGRAM(s) IV Intermittent every 8 hours  dextrose 5%. 1000 milliLiter(s) (50 mL/Hr) IV Continuous <Continuous>  dextrose 50% Injectable 12.5 Gram(s) IV Push once  dextrose 50% Injectable 25 Gram(s) IV Push once  dextrose 50% Injectable 25 Gram(s) IV Push once  heparin  Injectable 5000 Unit(s) SubCutaneous every 12 hours  insulin lispro (HumaLOG) corrective regimen sliding scale   SubCutaneous three times a day before meals  lactated ringers. 1000 milliLiter(s) (100 mL/Hr) IV Continuous <Continuous>  latanoprost 0.005% Ophthalmic Solution 1 Drop(s) Both EYES at bedtime  metoprolol tartrate 12.5 milliGRAM(s) Oral two times a day  mirtazapine 15 milliGRAM(s) Oral at bedtime  multivitamin 1 Tablet(s) Oral daily  vancomycin  IVPB 500 milliGRAM(s) IV Intermittent every 24 hours    MEDICATIONS  (PRN):  dextrose 40% Gel 15 Gram(s) Oral once PRN Blood Glucose LESS THAN 70 milliGRAM(s)/deciliter  glucagon  Injectable 1 milliGRAM(s) IntraMuscular once PRN Glucose LESS THAN 70 milligrams/deciliter        ROS: all systems reviewed and wnl      PHYSICAL EXAMINATION:  Vital Signs Last 24 Hrs  T(C): 36.4 (22 Dec 2018 10:50), Max: 38.2 (21 Dec 2018 20:17)  T(F): 97.6 (22 Dec 2018 10:50), Max: 100.7 (21 Dec 2018 20:17)  HR: 85 (22 Dec 2018 10:50) (83 - 101)  BP: 131/65 (22 Dec 2018 10:50) (100/55 - 131/65)  BP(mean): --  RR: 18 (22 Dec 2018 10:50) (17 - 24)  SpO2: 96% (22 Dec 2018 10:50) (93% - 97%)  CAPILLARY BLOOD GLUCOSE      POCT Blood Glucose.: 133 mg/dL (22 Dec 2018 11:43)      12-21 @ 07:01  -   @ 07:00  --------------------------------------------------------  IN: 600 mL / OUT: 0 mL / NET: 600 mL        GENERAL:   NECK: supple, No JVD  CHEST/LUNG: clear to auscultation bilaterally; no rales, rhonchi, or wheezing b/l  HEART: normal S1, S2  ABDOMEN: BS+, soft, ND, NT   EXTREMITIES:  pulses palpable; no clubbing, cyanosis, or edema b/l LEs  SKIN: no rashes or lesions      LABS:                        13.0   13.01 )-----------( 223      ( 22 Dec 2018 07:40 )             39.7         151<H>  |  119<H>  |  57<H>  ----------------------------<  154<H>  3.5   |  24  |  1.95<H>    Ca    9.5      22 Dec 2018 07:40    TPro  7.9  /  Alb  2.9<L>  /  TBili  0.7  /  DBili  x   /  AST  40<H>  /  ALT  41  /  AlkPhos  138<H>  12-21      Urinalysis Basic - ( 21 Dec 2018 20:11 )    Color: Yellow / Appearance: Clear / S.015 / pH: x  Gluc: x / Ketone: Small  / Bili: Negative / Urobili: 1 mg/dL   Blood: x / Protein: 100 mg/dL / Nitrite: Negative   Leuk Esterase: Trace / RBC: x / WBC 6-10   Sq Epi: x / Non Sq Epi: Many / Bacteria: Moderate INTERVAL HPI/OVERNIGHT EVENTS:  Pt seen and examined at bedside.     Allergies/Intolerance: PC Pen VK (Swelling)  penicillin (Unknown)      MEDICATIONS  (STANDING):  amLODIPine   Tablet 5 milliGRAM(s) Oral daily  aspirin enteric coated 81 milliGRAM(s) Oral daily  aztreonam  IVPB 500 milliGRAM(s) IV Intermittent every 8 hours  dextrose 5%. 1000 milliLiter(s) (50 mL/Hr) IV Continuous <Continuous>  dextrose 50% Injectable 12.5 Gram(s) IV Push once  dextrose 50% Injectable 25 Gram(s) IV Push once  dextrose 50% Injectable 25 Gram(s) IV Push once  heparin  Injectable 5000 Unit(s) SubCutaneous every 12 hours  insulin lispro (HumaLOG) corrective regimen sliding scale   SubCutaneous three times a day before meals  lactated ringers. 1000 milliLiter(s) (100 mL/Hr) IV Continuous <Continuous>  latanoprost 0.005% Ophthalmic Solution 1 Drop(s) Both EYES at bedtime  metoprolol tartrate 12.5 milliGRAM(s) Oral two times a day  mirtazapine 15 milliGRAM(s) Oral at bedtime  multivitamin 1 Tablet(s) Oral daily  vancomycin  IVPB 500 milliGRAM(s) IV Intermittent every 24 hours    MEDICATIONS  (PRN):  dextrose 40% Gel 15 Gram(s) Oral once PRN Blood Glucose LESS THAN 70 milliGRAM(s)/deciliter  glucagon  Injectable 1 milliGRAM(s) IntraMuscular once PRN Glucose LESS THAN 70 milligrams/deciliter        ROS: all systems reviewed and wnl      PHYSICAL EXAMINATION:  Vital Signs Last 24 Hrs  T(C): 36.4 (22 Dec 2018 10:50), Max: 38.2 (21 Dec 2018 20:17)  T(F): 97.6 (22 Dec 2018 10:50), Max: 100.7 (21 Dec 2018 20:17)  HR: 85 (22 Dec 2018 10:50) (83 - 101)  BP: 131/65 (22 Dec 2018 10:50) (100/55 - 131/65)  BP(mean): --  RR: 18 (22 Dec 2018 10:50) (17 - 24)  SpO2: 96% (22 Dec 2018 10:50) (93% - 97%)  CAPILLARY BLOOD GLUCOSE      POCT Blood Glucose.: 133 mg/dL (22 Dec 2018 11:43)      12-21 @ 07:01  -   @ 07:00  --------------------------------------------------------  IN: 600 mL / OUT: 0 mL / NET: 600 mL        GENERAL: stable in bed, NAD, no cough, SOB or wheeze  NECK: supple, No JVD  CHEST/LUNG: clear to auscultation bilaterally; no rales, rhonchi, or wheezing b/l  HEART: normal S1, S2  ABDOMEN: BS+, soft, ND, NT   EXTREMITIES:  pulses palpable; no clubbing, cyanosis, or edema b/l LEs  SKIN: no rashes or lesions      LABS:                        13.0   13.01 )-----------( 223      ( 22 Dec 2018 07:40 )             39.7     12-    151<H>  |  119<H>  |  57<H>  ----------------------------<  154<H>  3.5   |  24  |  1.95<H>    Ca    9.5      22 Dec 2018 07:40    TPro  7.9  /  Alb  2.9<L>  /  TBili  0.7  /  DBili  x   /  AST  40<H>  /  ALT  41  /  AlkPhos  138<H>  12-21      Urinalysis Basic - ( 21 Dec 2018 20:11 )    Color: Yellow / Appearance: Clear / S.015 / pH: x  Gluc: x / Ketone: Small  / Bili: Negative / Urobili: 1 mg/dL   Blood: x / Protein: 100 mg/dL / Nitrite: Negative   Leuk Esterase: Trace / RBC: x / WBC 6-10   Sq Epi: x / Non Sq Epi: Many / Bacteria: Moderate

## 2018-12-22 NOTE — PROGRESS NOTE ADULT - ASSESSMENT
89 y/o female w/advanced dementia, Anxiety, lower back pain,cad, chf, LBBB,  glaucoma, DM2 HTN, CAD, OA and macular degeneration sent from NH for sob

## 2018-12-22 NOTE — PROGRESS NOTE ADULT - PROBLEM SELECTOR PLAN 1
blood cultures pending, urine cx pending. Questionable urinary source.  Will stop vanco/aztreonam given WADE from admission, might be drug induced  effect. May be viral syndrome. blood cultures pending, urine cx pending. Questionable urinary source.  Will stop vanco/aztreonam given WADE from admission, might be drug induced  effect. May be viral syndrome. CXR read as clear.

## 2018-12-22 NOTE — PROGRESS NOTE ADULT - PROBLEM SELECTOR PLAN 2
Supportive care. Continue Remeron 15 mg/day at night. Supportive care. Continue Remeron 15 mg/day at night.  Stop telemetry.

## 2018-12-23 LAB
-  COAGULASE NEGATIVE STAPHYLOCOCCUS: SIGNIFICANT CHANGE UP
ANION GAP SERPL CALC-SCNC: 7 MMOL/L — SIGNIFICANT CHANGE UP (ref 5–17)
ANION GAP SERPL CALC-SCNC: 8 MMOL/L — SIGNIFICANT CHANGE UP (ref 5–17)
BUN SERPL-MCNC: 50 MG/DL — HIGH (ref 7–23)
BUN SERPL-MCNC: 51 MG/DL — HIGH (ref 7–23)
CALCIUM SERPL-MCNC: 9.6 MG/DL — SIGNIFICANT CHANGE UP (ref 8.5–10.1)
CALCIUM SERPL-MCNC: 9.6 MG/DL — SIGNIFICANT CHANGE UP (ref 8.5–10.1)
CHLORIDE SERPL-SCNC: 117 MMOL/L — HIGH (ref 96–108)
CHLORIDE SERPL-SCNC: 118 MMOL/L — HIGH (ref 96–108)
CO2 SERPL-SCNC: 25 MMOL/L — SIGNIFICANT CHANGE UP (ref 22–31)
CO2 SERPL-SCNC: 27 MMOL/L — SIGNIFICANT CHANGE UP (ref 22–31)
CREAT SERPL-MCNC: 1.18 MG/DL — SIGNIFICANT CHANGE UP (ref 0.5–1.3)
CREAT SERPL-MCNC: 1.19 MG/DL — SIGNIFICANT CHANGE UP (ref 0.5–1.3)
CULTURE RESULTS: SIGNIFICANT CHANGE UP
GLUCOSE BLDC GLUCOMTR-MCNC: 138 MG/DL — HIGH (ref 70–99)
GLUCOSE BLDC GLUCOMTR-MCNC: 138 MG/DL — HIGH (ref 70–99)
GLUCOSE BLDC GLUCOMTR-MCNC: 142 MG/DL — HIGH (ref 70–99)
GLUCOSE BLDC GLUCOMTR-MCNC: 144 MG/DL — HIGH (ref 70–99)
GLUCOSE SERPL-MCNC: 143 MG/DL — HIGH (ref 70–99)
GLUCOSE SERPL-MCNC: 147 MG/DL — HIGH (ref 70–99)
GRAM STN FLD: SIGNIFICANT CHANGE UP
HCT VFR BLD CALC: 39.8 % — SIGNIFICANT CHANGE UP (ref 34.5–45)
HGB BLD-MCNC: 13.1 G/DL — SIGNIFICANT CHANGE UP (ref 11.5–15.5)
MCHC RBC-ENTMCNC: 30.5 PG — SIGNIFICANT CHANGE UP (ref 27–34)
MCHC RBC-ENTMCNC: 32.9 GM/DL — SIGNIFICANT CHANGE UP (ref 32–36)
MCV RBC AUTO: 92.8 FL — SIGNIFICANT CHANGE UP (ref 80–100)
METHOD TYPE: SIGNIFICANT CHANGE UP
NRBC # BLD: 0 /100 WBCS — SIGNIFICANT CHANGE UP (ref 0–0)
ORGANISM # SPEC MICROSCOPIC CNT: SIGNIFICANT CHANGE UP
ORGANISM # SPEC MICROSCOPIC CNT: SIGNIFICANT CHANGE UP
PLATELET # BLD AUTO: 208 K/UL — SIGNIFICANT CHANGE UP (ref 150–400)
POTASSIUM SERPL-MCNC: 3.2 MMOL/L — LOW (ref 3.5–5.3)
POTASSIUM SERPL-MCNC: 3.5 MMOL/L — SIGNIFICANT CHANGE UP (ref 3.5–5.3)
POTASSIUM SERPL-SCNC: 3.2 MMOL/L — LOW (ref 3.5–5.3)
POTASSIUM SERPL-SCNC: 3.5 MMOL/L — SIGNIFICANT CHANGE UP (ref 3.5–5.3)
RBC # BLD: 4.29 M/UL — SIGNIFICANT CHANGE UP (ref 3.8–5.2)
RBC # FLD: 13.2 % — SIGNIFICANT CHANGE UP (ref 10.3–14.5)
SODIUM SERPL-SCNC: 150 MMOL/L — HIGH (ref 135–145)
SODIUM SERPL-SCNC: 152 MMOL/L — HIGH (ref 135–145)
SPECIMEN SOURCE: SIGNIFICANT CHANGE UP
WBC # BLD: 8.24 K/UL — SIGNIFICANT CHANGE UP (ref 3.8–10.5)
WBC # FLD AUTO: 8.24 K/UL — SIGNIFICANT CHANGE UP (ref 3.8–10.5)

## 2018-12-23 PROCEDURE — 99233 SBSQ HOSP IP/OBS HIGH 50: CPT

## 2018-12-23 RX ORDER — POTASSIUM CHLORIDE 20 MEQ
20 PACKET (EA) ORAL ONCE
Qty: 0 | Refills: 0 | Status: COMPLETED | OUTPATIENT
Start: 2018-12-23 | End: 2018-12-23

## 2018-12-23 RX ORDER — VANCOMYCIN HCL 1 G
500 VIAL (EA) INTRAVENOUS ONCE
Qty: 0 | Refills: 0 | Status: COMPLETED | OUTPATIENT
Start: 2018-12-23 | End: 2018-12-23

## 2018-12-23 RX ADMIN — SODIUM CHLORIDE 75 MILLILITER(S): 9 INJECTION, SOLUTION INTRAVENOUS at 21:56

## 2018-12-23 RX ADMIN — HEPARIN SODIUM 5000 UNIT(S): 5000 INJECTION INTRAVENOUS; SUBCUTANEOUS at 17:35

## 2018-12-23 RX ADMIN — AMLODIPINE BESYLATE 5 MILLIGRAM(S): 2.5 TABLET ORAL at 06:09

## 2018-12-23 RX ADMIN — Medication 100 MILLIGRAM(S): at 05:38

## 2018-12-23 RX ADMIN — Medication 20 MILLIGRAM(S): at 16:14

## 2018-12-23 RX ADMIN — LATANOPROST 1 DROP(S): 0.05 SOLUTION/ DROPS OPHTHALMIC; TOPICAL at 21:56

## 2018-12-23 RX ADMIN — SODIUM CHLORIDE 75 MILLILITER(S): 9 INJECTION, SOLUTION INTRAVENOUS at 10:58

## 2018-12-23 RX ADMIN — Medication 12.5 MILLIGRAM(S): at 17:36

## 2018-12-23 RX ADMIN — Medication 81 MILLIGRAM(S): at 11:50

## 2018-12-23 RX ADMIN — HEPARIN SODIUM 5000 UNIT(S): 5000 INJECTION INTRAVENOUS; SUBCUTANEOUS at 05:40

## 2018-12-23 RX ADMIN — MIRTAZAPINE 15 MILLIGRAM(S): 45 TABLET, ORALLY DISINTEGRATING ORAL at 21:56

## 2018-12-23 RX ADMIN — Medication 20 MILLIEQUIVALENT(S): at 09:03

## 2018-12-23 RX ADMIN — Medication 1 TABLET(S): at 11:50

## 2018-12-23 RX ADMIN — Medication 12.5 MILLIGRAM(S): at 06:09

## 2018-12-23 NOTE — DIETITIAN INITIAL EVALUATION ADULT. - PERTINENT LABORATORY DATA
12-23 Na152 mmol/L<H> Glu 143 mg/dL<H> K+ 3.5 mmol/L Cr  1.19 mg/dL BUN 50 mg/dL<H> 12-21 Alb 2.9 g/dL<L> 12-16 [id number] 6.0 %<H>12-21 ALT 41 U/L AST 40 U/L<H> Alkaline Phosphatase 138 U/L<H>

## 2018-12-23 NOTE — PROGRESS NOTE ADULT - PROBLEM SELECTOR PLAN 3
Change IVF to D5W at 75 ml/h for hypernatremia of 151.   WADE resolved as creatinine 1.19. SMA-7 in AM.

## 2018-12-23 NOTE — DIETITIAN INITIAL EVALUATION ADULT. - PHYSICAL APPEARANCE
debilitated/BMI= 20.8(12/21), Nutrition focused physical exam conducted; Subcutaneous fat Exam;  [moderate  ]  Orbital fat pads region,  [unable due poor dentition, only few teeth in mouth  ]Buccal fat region,  [moderate  ]triceps region, [  unable]ribs region.  Muscle Exam; [ moderate  ]temples region, [ moderate ]clavicle region, [  moderate]shoulder region, [  unable]Scapula region, [ unable ]Interosseous region, [ moderate to severe ]thigh region, [ moderate ]Calf region/contracted

## 2018-12-23 NOTE — DIETITIAN INITIAL EVALUATION ADULT. - NS AS NUTRI INTERV MEDICAL AND FOOD SUPPLEMENTS
Recommend add  Glucerna Shake 2 x daily= 440 calories, 20 grams protein daily , No Carb Prosource 1 pkg daily=60 calories, 15 grams protein per pkg/Commercial beverage

## 2018-12-23 NOTE — DIETITIAN INITIAL EVALUATION ADULT. - OTHER INFO
Pt seen due to CHF dx.  Pt c poor oral intake, consuming <25%, drank fluids from tray as per Nursing Assistant.  Pt adm c pressure ulcers stage 1 & II.

## 2018-12-23 NOTE — DIETITIAN INITIAL EVALUATION ADULT. - PERTINENT MEDS FT
MEDICATIONS  (STANDING):  amLODIPine   Tablet 5 milliGRAM(s) Oral daily  aspirin enteric coated 81 milliGRAM(s) Oral daily  dextrose 5%. 1000 milliLiter(s) (75 mL/Hr) IV Continuous <Continuous>  dextrose 5%. 1000 milliLiter(s) (50 mL/Hr) IV Continuous <Continuous>  dextrose 50% Injectable 12.5 Gram(s) IV Push once  dextrose 50% Injectable 25 Gram(s) IV Push once  dextrose 50% Injectable 25 Gram(s) IV Push once  heparin  Injectable 5000 Unit(s) SubCutaneous every 12 hours  insulin lispro (HumaLOG) corrective regimen sliding scale   SubCutaneous three times a day before meals  latanoprost 0.005% Ophthalmic Solution 1 Drop(s) Both EYES at bedtime  metoprolol tartrate 12.5 milliGRAM(s) Oral two times a day  mirtazapine 15 milliGRAM(s) Oral at bedtime  multivitamin 1 Tablet(s) Oral daily    MEDICATIONS  (PRN):  dextrose 40% Gel 15 Gram(s) Oral once PRN Blood Glucose LESS THAN 70 milliGRAM(s)/deciliter  glucagon  Injectable 1 milliGRAM(s) IntraMuscular once PRN Glucose LESS THAN 70 milligrams/deciliter

## 2018-12-23 NOTE — CHART NOTE - NSCHARTNOTEFT_GEN_A_CORE
Upon Nutritional Assessment by the Registered Dietitian your patient was determined to meet criteria / has evidence of the following diagnosis/diagnoses:          [ ]  Mild Protein Calorie Malnutrition        [x ]  Moderate Protein Calorie Malnutrition        [ ] Severe Protein Calorie Malnutrition        [ ] Unspecified Protein Calorie Malnutrition        [ ] Underweight / BMI <19        [ ] Morbid Obesity / BMI > 40      Findings as based on:  •  Comprehensive nutrition assessment and consultation  •  Calorie counts (nutrient intake analysis)  •  Food acceptance and intake status from observations by staff  •  Follow up  •  Patient education  •  Intervention secondary to interdisciplinary rounds  •   concerns      Treatment:    The following diet has been recommended:  Low sodium, Dysphagia 1 Pureed- Thin Liquids,  Glucerna Shake 2 x daily= 440 calories, 20 grams protein daily , No Carb Prosource 1 pkg daily=60 calories, 15 grams protein per pkg   Recommend add multivitamin c minerals daily       PROVIDER Section:     By signing this assessment you are acknowledging and agree with the diagnosis/diagnoses assigned by the Registered Dietitian    Comments:

## 2018-12-23 NOTE — DIETITIAN INITIAL EVALUATION ADULT. - NS AS NUTRI INTERV MEALS SNACK
Carbohydrate - modified diet/Recommend liberalize diet to low sodium, Dysphagia 1 Pureed- Thin Liquids/Texture-modified diet/Mineral - modified diet

## 2018-12-23 NOTE — DIETITIAN INITIAL EVALUATION ADULT. - DIET TYPE
consistent carbohydrate (evening snack)/12/22/soft/DASH/TLC (sodium and cholesterol restricted diet)

## 2018-12-23 NOTE — DIETITIAN INITIAL EVALUATION ADULT. - ENERGY NEEDS
Height (cm): 154.94 (12-21), 162.56 (12-19)  Weight (kg): 49.9 (12-21), 49.9 (12-19)  BMI (kg/m2): 20.8 (12-21), 18.9 (12-19)  IBW: 47.6 kg   % IBW: 104%           UBW: ?         %UBW: ?, as per adm wt.  wt. loss of 7 kg from adm wt(pt. information)wt. noted, ? wt. accuracy

## 2018-12-23 NOTE — DIETITIAN INITIAL EVALUATION ADULT. - ADHERENCE
pt was on no added salt, puree diet c Ensure 2 x day as per transfer records from [facility]/n/a

## 2018-12-23 NOTE — PROGRESS NOTE ADULT - PROBLEM SELECTOR PLAN 1
Blood cultures likely contaminant. No treatment needed. Urine cx clear.    Will stop vanco/aztreonam given WADE from admission, might be drug induced  effect. Fevers likely from viral syndrome. CXR read as clear.

## 2018-12-23 NOTE — PROGRESS NOTE ADULT - SUBJECTIVE AND OBJECTIVE BOX
INTERVAL HPI/OVERNIGHT EVENTS:  Pt seen and examined at bedside.     Allergies/Intolerance: PC Pen VK (Swelling)  penicillin (Unknown)      MEDICATIONS  (STANDING):  amLODIPine   Tablet 5 milliGRAM(s) Oral daily  aspirin enteric coated 81 milliGRAM(s) Oral daily  dextrose 5%. 1000 milliLiter(s) (75 mL/Hr) IV Continuous <Continuous>  dextrose 5%. 1000 milliLiter(s) (50 mL/Hr) IV Continuous <Continuous>  dextrose 50% Injectable 12.5 Gram(s) IV Push once  dextrose 50% Injectable 25 Gram(s) IV Push once  dextrose 50% Injectable 25 Gram(s) IV Push once  heparin  Injectable 5000 Unit(s) SubCutaneous every 12 hours  insulin lispro (HumaLOG) corrective regimen sliding scale   SubCutaneous three times a day before meals  latanoprost 0.005% Ophthalmic Solution 1 Drop(s) Both EYES at bedtime  metoprolol tartrate 12.5 milliGRAM(s) Oral two times a day  mirtazapine 15 milliGRAM(s) Oral at bedtime  multivitamin 1 Tablet(s) Oral daily    MEDICATIONS  (PRN):  dextrose 40% Gel 15 Gram(s) Oral once PRN Blood Glucose LESS THAN 70 milliGRAM(s)/deciliter  glucagon  Injectable 1 milliGRAM(s) IntraMuscular once PRN Glucose LESS THAN 70 milligrams/deciliter        ROS: all systems reviewed and wnl      PHYSICAL EXAMINATION:  Vital Signs Last 24 Hrs  T(C): 37 (23 Dec 2018 11:21), Max: 37 (23 Dec 2018 11:21)  T(F): 98.6 (23 Dec 2018 11:21), Max: 98.6 (23 Dec 2018 11:21)  HR: 83 (23 Dec 2018 11:21) (73 - 93)  BP: 152/70 (23 Dec 2018 11:21) (139/78 - 165/81)  BP(mean): --  RR: 18 (23 Dec 2018 11:21) (16 - 18)  SpO2: 97% (23 Dec 2018 11:21) (93% - 97%)  CAPILLARY BLOOD GLUCOSE      POCT Blood Glucose.: 144 mg/dL (23 Dec 2018 11:05)  POCT Blood Glucose.: 138 mg/dL (23 Dec 2018 07:30)  POCT Blood Glucose.: 126 mg/dL (22 Dec 2018 20:54)       @ 07:01  -   @ 07:00  --------------------------------------------------------  IN: 1000 mL / OUT: 0 mL / NET: 1000 mL        GENERAL:   NECK: supple, No JVD  CHEST/LUNG: clear to auscultation bilaterally; no rales, rhonchi, or wheezing b/l  HEART: normal S1, S2  ABDOMEN: BS+, soft, ND, NT   EXTREMITIES:  pulses palpable; no clubbing, cyanosis, or edema b/l LEs  SKIN: no rashes or lesions      LABS:                        13.1   8.24  )-----------( 208      ( 23 Dec 2018 07:20 )             39.8         152<H>  |  117<H>  |  50<H>  ----------------------------<  143<H>  3.5   |  27  |  1.19    Ca    9.6      23 Dec 2018 08:26    TPro  7.9  /  Alb  2.9<L>  /  TBili  0.7  /  DBili  x   /  AST  40<H>  /  ALT  41  /  AlkPhos  138<H>  12-21      Urinalysis Basic - ( 21 Dec 2018 20:11 )    Color: Yellow / Appearance: Clear / S.015 / pH: x  Gluc: x / Ketone: Small  / Bili: Negative / Urobili: 1 mg/dL   Blood: x / Protein: 100 mg/dL / Nitrite: Negative   Leuk Esterase: Trace / RBC: x / WBC 6-10   Sq Epi: x / Non Sq Epi: Many / Bacteria: Moderate INTERVAL HPI/OVERNIGHT EVENTS:  Pt seen and examined at bedside.     Allergies/Intolerance: PC Pen VK (Swelling)  penicillin (Unknown)      MEDICATIONS  (STANDING):  amLODIPine   Tablet 5 milliGRAM(s) Oral daily  aspirin enteric coated 81 milliGRAM(s) Oral daily  dextrose 5%. 1000 milliLiter(s) (75 mL/Hr) IV Continuous <Continuous>  dextrose 5%. 1000 milliLiter(s) (50 mL/Hr) IV Continuous <Continuous>  dextrose 50% Injectable 12.5 Gram(s) IV Push once  dextrose 50% Injectable 25 Gram(s) IV Push once  dextrose 50% Injectable 25 Gram(s) IV Push once  heparin  Injectable 5000 Unit(s) SubCutaneous every 12 hours  insulin lispro (HumaLOG) corrective regimen sliding scale   SubCutaneous three times a day before meals  latanoprost 0.005% Ophthalmic Solution 1 Drop(s) Both EYES at bedtime  metoprolol tartrate 12.5 milliGRAM(s) Oral two times a day  mirtazapine 15 milliGRAM(s) Oral at bedtime  multivitamin 1 Tablet(s) Oral daily    MEDICATIONS  (PRN):  dextrose 40% Gel 15 Gram(s) Oral once PRN Blood Glucose LESS THAN 70 milliGRAM(s)/deciliter  glucagon  Injectable 1 milliGRAM(s) IntraMuscular once PRN Glucose LESS THAN 70 milligrams/deciliter        ROS: all systems reviewed and wnl      PHYSICAL EXAMINATION:  Vital Signs Last 24 Hrs  T(C): 37 (23 Dec 2018 11:21), Max: 37 (23 Dec 2018 11:21)  T(F): 98.6 (23 Dec 2018 11:21), Max: 98.6 (23 Dec 2018 11:21)  HR: 83 (23 Dec 2018 11:21) (73 - 93)  BP: 152/70 (23 Dec 2018 11:21) (139/78 - 165/81)  BP(mean): --  RR: 18 (23 Dec 2018 11:21) (16 - 18)  SpO2: 97% (23 Dec 2018 11:21) (93% - 97%)  CAPILLARY BLOOD GLUCOSE      POCT Blood Glucose.: 144 mg/dL (23 Dec 2018 11:05)  POCT Blood Glucose.: 138 mg/dL (23 Dec 2018 07:30)  POCT Blood Glucose.: 126 mg/dL (22 Dec 2018 20:54)       @ 07:01  -   @ 07:00  --------------------------------------------------------  IN: 1000 mL / OUT: 0 mL / NET: 1000 mL        GENERAL: stable, comfortable in bed, no cough, SOB or fevers  NECK: supple, No JVD  CHEST/LUNG: clear to auscultation bilaterally; no rales, rhonchi, or wheezing b/l  HEART: normal S1, S2  ABDOMEN: BS+, soft, ND, NT   EXTREMITIES:  pulses palpable; no clubbing, cyanosis, or edema b/l LEs  SKIN: no rashes or lesions      LABS:                        13.1   8.24  )-----------( 208      ( 23 Dec 2018 07:20 )             39.8         152<H>  |  117<H>  |  50<H>  ----------------------------<  143<H>  3.5   |  27  |  1.19    Ca    9.6      23 Dec 2018 08:26    TPro  7.9  /  Alb  2.9<L>  /  TBili  0.7  /  DBili  x   /  AST  40<H>  /  ALT  41  /  AlkPhos  138<H>  12-21      Urinalysis Basic - ( 21 Dec 2018 20:11 )    Color: Yellow / Appearance: Clear / S.015 / pH: x  Gluc: x / Ketone: Small  / Bili: Negative / Urobili: 1 mg/dL   Blood: x / Protein: 100 mg/dL / Nitrite: Negative   Leuk Esterase: Trace / RBC: x / WBC 6-10   Sq Epi: x / Non Sq Epi: Many / Bacteria: Moderate

## 2018-12-24 ENCOUNTER — TRANSCRIPTION ENCOUNTER (OUTPATIENT)
Age: 83
End: 2018-12-24

## 2018-12-24 VITALS — WEIGHT: 95.02 LBS

## 2018-12-24 LAB
ANION GAP SERPL CALC-SCNC: 8 MMOL/L — SIGNIFICANT CHANGE UP (ref 5–17)
BUN SERPL-MCNC: 35 MG/DL — HIGH (ref 7–23)
CALCIUM SERPL-MCNC: 9 MG/DL — SIGNIFICANT CHANGE UP (ref 8.5–10.1)
CHLORIDE SERPL-SCNC: 111 MMOL/L — HIGH (ref 96–108)
CO2 SERPL-SCNC: 24 MMOL/L — SIGNIFICANT CHANGE UP (ref 22–31)
CREAT SERPL-MCNC: 0.89 MG/DL — SIGNIFICANT CHANGE UP (ref 0.5–1.3)
GLUCOSE BLDC GLUCOMTR-MCNC: 145 MG/DL — HIGH (ref 70–99)
GLUCOSE SERPL-MCNC: 129 MG/DL — HIGH (ref 70–99)
POTASSIUM SERPL-MCNC: 3.5 MMOL/L — SIGNIFICANT CHANGE UP (ref 3.5–5.3)
POTASSIUM SERPL-SCNC: 3.5 MMOL/L — SIGNIFICANT CHANGE UP (ref 3.5–5.3)
SODIUM SERPL-SCNC: 143 MMOL/L — SIGNIFICANT CHANGE UP (ref 135–145)

## 2018-12-24 PROCEDURE — 99239 HOSP IP/OBS DSCHRG MGMT >30: CPT

## 2018-12-24 RX ORDER — ASCORBIC ACID 60 MG
5 TABLET,CHEWABLE ORAL
Qty: 0 | Refills: 0 | COMMUNITY

## 2018-12-24 RX ADMIN — Medication 12.5 MILLIGRAM(S): at 05:41

## 2018-12-24 RX ADMIN — SODIUM CHLORIDE 75 MILLILITER(S): 9 INJECTION, SOLUTION INTRAVENOUS at 11:56

## 2018-12-24 RX ADMIN — Medication 81 MILLIGRAM(S): at 11:56

## 2018-12-24 RX ADMIN — HEPARIN SODIUM 5000 UNIT(S): 5000 INJECTION INTRAVENOUS; SUBCUTANEOUS at 05:39

## 2018-12-24 RX ADMIN — AMLODIPINE BESYLATE 5 MILLIGRAM(S): 2.5 TABLET ORAL at 05:42

## 2018-12-24 RX ADMIN — Medication 1 TABLET(S): at 11:56

## 2018-12-24 RX ADMIN — Medication 20 MILLIGRAM(S): at 05:45

## 2018-12-24 NOTE — DISCHARGE NOTE ADULT - MEDICATION SUMMARY - MEDICATIONS TO TAKE
I will START or STAY ON the medications listed below when I get home from the hospital:    acetaminophen 500 mg oral tablet  -- 2 tab(s) by mouth every 6 hours  -- This product contains acetaminophen.  Do not use  with any other product containing acetaminophen to prevent possible liver damage.    -- Indication: For pain    Aspir 81  -- chewable  -- Indication: For HTN (hypertension)    enalapril 20 mg oral tablet  -- 1 tab(s) by mouth once a day  -- Indication: For HTN (hypertension)    mirtazapine 15 mg oral tablet  -- 1 tab(s) by mouth once a day (at bedtime)  -- Indication: For dementia    metoprolol tartrate 25 mg oral tablet  -- 0.5 tab(s) by mouth 2 times a day   -- It is very important that you take or use this exactly as directed.  Do not skip doses or discontinue unless directed by your doctor.  May cause drowsiness.  Alcohol may intensify this effect.  Use care when operating dangerous machinery.  Some non-prescription drugs may aggravate your condition.  Read all labels carefully.  If a warning appears, check with your doctor before taking.  Take with food or milk.  This drug may impair the ability to drive or operate machinery.  Use care until you become familiar with its effects.    -- Indication: For CHF    amLODIPine 5 mg oral tablet  -- 1 tab(s) by mouth once a day  -- Indication: For HTN (hypertension)    latanoprost 0.005% ophthalmic solution  -- 1 drop(s) to each affected eye once a day (in the evening)both  eyes  -- Indication: For glaucoma    pantoprazole 40 mg oral delayed release tablet  -- 1 tab(s) by mouth once a day  -- Indication: For GERD    Daily Anh oral tablet  -- 1 tab(s) by mouth once a day  -- Indication: For Supplement I will START or STAY ON the medications listed below when I get home from the hospital:    acetaminophen 500 mg oral tablet  -- 2 tab(s) by mouth every 6 hours  -- This product contains acetaminophen.  Do not use  with any other product containing acetaminophen to prevent possible liver damage.    -- Indication: For pain    Aspir 81  -- chewable  -- Indication: For HTN (hypertension)    enalapril 20 mg oral tablet  -- 1 tab(s) by mouth once a day  -- Indication: For HTN (hypertension)    mirtazapine 15 mg oral tablet  -- 1 tab(s) by mouth once a day (at bedtime)  -- Indication: For Dementia    metoprolol tartrate 25 mg oral tablet  -- 0.5 tab(s) by mouth 2 times a day   -- It is very important that you take or use this exactly as directed.  Do not skip doses or discontinue unless directed by your doctor.  May cause drowsiness.  Alcohol may intensify this effect.  Use care when operating dangerous machinery.  Some non-prescription drugs may aggravate your condition.  Read all labels carefully.  If a warning appears, check with your doctor before taking.  Take with food or milk.  This drug may impair the ability to drive or operate machinery.  Use care until you become familiar with its effects.    -- Indication: For ChF    amLODIPine 5 mg oral tablet  -- 1 tab(s) by mouth once a day  -- Indication: For HTN (hypertension)    latanoprost 0.005% ophthalmic solution  -- 1 drop(s) to each affected eye once a day (in the evening)both  eyes  -- Indication: For glaucoma    pantoprazole 40 mg oral delayed release tablet  -- 1 tab(s) by mouth once a day  -- Indication: For GERD    Daily Anh oral tablet  -- 1 tab(s) by mouth once a day  -- Indication: For supplement     Vitamin C 500 mg/5 mL oral liquid  -- 5 milliliter(s) by mouth once a day  -- Indication: For supplement

## 2018-12-24 NOTE — PHYSICAL THERAPY INITIAL EVALUATION ADULT - GENERAL OBSERVATIONS, REHAB EVAL
pc with pt. Ultrasound does not show hepatoma. pt encountered in bed supine, NAD aaox0, has advance dementia

## 2018-12-24 NOTE — DISCHARGE NOTE ADULT - SECONDARY DIAGNOSIS.
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

## 2018-12-24 NOTE — DISCHARGE NOTE ADULT - CARE PLAN
Principal Discharge DX:	Viral URI with cough  Goal:	symptomatic management  Assessment and plan of treatment:	-follow with PCP within 1 week   -if patient develops fevers, productive cough, altered mental status from baseline, diarrhea, foul smelling urine return to the Emergency department immediately.  Secondary Diagnosis:	Hypernatremia  Assessment and plan of treatment:	free water (honey thick) 1.2 liters per 24 hours, check chem once weekly. If sodium continues to be within normal range, can check chemistry every 2-3 weeks.  Secondary Diagnosis:	Late onset Alzheimer's disease without behavioral disturbance  Secondary Diagnosis:	Chronic bronchitis with emphysema  Secondary Diagnosis:	Moderate protein-calorie malnutrition  Secondary Diagnosis:	Functional quadriplegia  Secondary Diagnosis:	Dysphagia, unspecified type  Assessment and plan of treatment:	-cotninue pureed diet -honey thick

## 2018-12-24 NOTE — DISCHARGE NOTE ADULT - PLAN OF CARE
symptomatic management -follow with PCP within 1 week   -if patient develops fevers, productive cough, altered mental status from baseline, diarrhea, foul smelling urine return to the Emergency department immediately. free water (honey thick) 1.2 liters per 24 hours, check chem once weekly. If sodium continues to be within normal range, can check chemistry every 2-3 weeks. -yael pureed diet -honey thick

## 2018-12-24 NOTE — DISCHARGE NOTE ADULT - HOSPITAL COURSE
89 y/o female w/ advanced dementia, Anxiety, lower back pain, cad, chf, LBBB,  glaucoma, DM2 HTN, CAD, OA and macular degeneration sent from [facility] respiratory distress. Reported to have non-productive cough and low grade temp. Patient recently  discharged from [facility] -admission was for viral URI, + parainfluenza. In the ED, Tmax 100.7F.   Work-up has been unremarkable. CXR clear. UA/UCx and Blood cx negative.   Patient was found to have hypernatremia which resolved with IVF.   Patient remained afebrile, HD stable.         Discharge time : 40 min

## 2018-12-24 NOTE — DISCHARGE NOTE ADULT - PATIENT PORTAL LINK FT
You can access the [facility] Patient Portal, offered by [facility], by registering with the following website: [email or web address]

## 2018-12-26 LAB
GLUCOSE BLDC GLUCOMTR-MCNC: 146 MG/DL — HIGH (ref 70–99)
GLUCOSE BLDC GLUCOMTR-MCNC: 158 MG/DL — HIGH (ref 70–99)

## 2018-12-27 LAB
CULTURE RESULTS: SIGNIFICANT CHANGE UP
SPECIMEN SOURCE: SIGNIFICANT CHANGE UP

## 2018-12-28 DIAGNOSIS — J42 UNSPECIFIED CHRONIC BRONCHITIS: ICD-10-CM

## 2018-12-28 DIAGNOSIS — E78.2 MIXED HYPERLIPIDEMIA: ICD-10-CM

## 2018-12-28 DIAGNOSIS — E87.0 HYPEROSMOLALITY AND HYPERNATREMIA: ICD-10-CM

## 2018-12-28 DIAGNOSIS — B97.89 OTHER VIRAL AGENTS AS THE CAUSE OF DISEASES CLASSIFIED ELSEWHERE: ICD-10-CM

## 2018-12-28 DIAGNOSIS — Z90.710 ACQUIRED ABSENCE OF BOTH CERVIX AND UTERUS: ICD-10-CM

## 2018-12-28 DIAGNOSIS — E44.0 MODERATE PROTEIN-CALORIE MALNUTRITION: ICD-10-CM

## 2018-12-28 DIAGNOSIS — R06.02 SHORTNESS OF BREATH: ICD-10-CM

## 2018-12-28 DIAGNOSIS — F02.80 DEMENTIA IN OTHER DISEASES CLASSIFIED ELSEWHERE, UNSPECIFIED SEVERITY, WITHOUT BEHAVIORAL DISTURBANCE, PSYCHOTIC DISTURBANCE, MOOD DISTURBANCE, AND ANXIETY: ICD-10-CM

## 2018-12-28 DIAGNOSIS — I25.2 OLD MYOCARDIAL INFARCTION: ICD-10-CM

## 2018-12-28 DIAGNOSIS — E11.65 TYPE 2 DIABETES MELLITUS WITH HYPERGLYCEMIA: ICD-10-CM

## 2018-12-28 DIAGNOSIS — Z66 DO NOT RESUSCITATE: ICD-10-CM

## 2018-12-28 DIAGNOSIS — J43.9 EMPHYSEMA, UNSPECIFIED: ICD-10-CM

## 2018-12-28 DIAGNOSIS — H35.30 UNSPECIFIED MACULAR DEGENERATION: ICD-10-CM

## 2018-12-28 DIAGNOSIS — I44.7 LEFT BUNDLE-BRANCH BLOCK, UNSPECIFIED: ICD-10-CM

## 2018-12-28 DIAGNOSIS — Z28.21 IMMUNIZATION NOT CARRIED OUT BECAUSE OF PATIENT REFUSAL: ICD-10-CM

## 2018-12-28 DIAGNOSIS — Z86.79 PERSONAL HISTORY OF OTHER DISEASES OF THE CIRCULATORY SYSTEM: ICD-10-CM

## 2018-12-28 DIAGNOSIS — H40.9 UNSPECIFIED GLAUCOMA: ICD-10-CM

## 2018-12-28 DIAGNOSIS — J06.9 ACUTE UPPER RESPIRATORY INFECTION, UNSPECIFIED: ICD-10-CM

## 2018-12-28 DIAGNOSIS — G30.1 ALZHEIMER'S DISEASE WITH LATE ONSET: ICD-10-CM

## 2018-12-28 DIAGNOSIS — Z88.0 ALLERGY STATUS TO PENICILLIN: ICD-10-CM

## 2018-12-28 DIAGNOSIS — R50.9 FEVER, UNSPECIFIED: ICD-10-CM

## 2018-12-28 DIAGNOSIS — N17.9 ACUTE KIDNEY FAILURE, UNSPECIFIED: ICD-10-CM

## 2018-12-28 DIAGNOSIS — I25.10 ATHEROSCLEROTIC HEART DISEASE OF NATIVE CORONARY ARTERY WITHOUT ANGINA PECTORIS: ICD-10-CM

## 2018-12-28 DIAGNOSIS — R13.10 DYSPHAGIA, UNSPECIFIED: ICD-10-CM

## 2018-12-28 DIAGNOSIS — M19.90 UNSPECIFIED OSTEOARTHRITIS, UNSPECIFIED SITE: ICD-10-CM

## 2019-03-02 ENCOUNTER — EMERGENCY (EMERGENCY)
Facility: HOSPITAL | Age: 84
LOS: 0 days | End: 2019-03-02
Attending: EMERGENCY MEDICINE
Payer: MEDICARE

## 2019-03-02 VITALS
HEIGHT: 59 IN | WEIGHT: 80.03 LBS | HEART RATE: 130 BPM | DIASTOLIC BLOOD PRESSURE: 51 MMHG | OXYGEN SATURATION: 98 % | RESPIRATION RATE: 16 BRPM | SYSTOLIC BLOOD PRESSURE: 78 MMHG

## 2019-03-02 DIAGNOSIS — Z88.0 ALLERGY STATUS TO PENICILLIN: ICD-10-CM

## 2019-03-02 DIAGNOSIS — H35.30 UNSPECIFIED MACULAR DEGENERATION: ICD-10-CM

## 2019-03-02 DIAGNOSIS — J96.01 ACUTE RESPIRATORY FAILURE WITH HYPOXIA: ICD-10-CM

## 2019-03-02 DIAGNOSIS — E11.9 TYPE 2 DIABETES MELLITUS WITHOUT COMPLICATIONS: ICD-10-CM

## 2019-03-02 DIAGNOSIS — F41.9 ANXIETY DISORDER, UNSPECIFIED: ICD-10-CM

## 2019-03-02 DIAGNOSIS — R11.0 NAUSEA: ICD-10-CM

## 2019-03-02 DIAGNOSIS — I50.9 HEART FAILURE, UNSPECIFIED: ICD-10-CM

## 2019-03-02 DIAGNOSIS — I10 ESSENTIAL (PRIMARY) HYPERTENSION: ICD-10-CM

## 2019-03-02 DIAGNOSIS — M47.9 SPONDYLOSIS, UNSPECIFIED: ICD-10-CM

## 2019-03-02 DIAGNOSIS — Z79.82 LONG TERM (CURRENT) USE OF ASPIRIN: ICD-10-CM

## 2019-03-02 DIAGNOSIS — M54.5 LOW BACK PAIN: ICD-10-CM

## 2019-03-02 DIAGNOSIS — F03.90 UNSPECIFIED DEMENTIA WITHOUT BEHAVIORAL DISTURBANCE: ICD-10-CM

## 2019-03-02 DIAGNOSIS — H40.9 UNSPECIFIED GLAUCOMA: ICD-10-CM

## 2019-03-02 DIAGNOSIS — I25.10 ATHEROSCLEROTIC HEART DISEASE OF NATIVE CORONARY ARTERY WITHOUT ANGINA PECTORIS: ICD-10-CM

## 2019-03-02 DIAGNOSIS — R00.0 TACHYCARDIA, UNSPECIFIED: ICD-10-CM

## 2019-03-02 LAB
APPEARANCE UR: CLEAR — SIGNIFICANT CHANGE UP
BILIRUB UR-MCNC: ABNORMAL
COLOR SPEC: YELLOW — SIGNIFICANT CHANGE UP
DIFF PNL FLD: NEGATIVE — SIGNIFICANT CHANGE UP
FLU A RESULT: SIGNIFICANT CHANGE UP
FLU A RESULT: SIGNIFICANT CHANGE UP
FLUAV AG NPH QL: SIGNIFICANT CHANGE UP
FLUBV AG NPH QL: SIGNIFICANT CHANGE UP
GLUCOSE UR QL: NEGATIVE MG/DL — SIGNIFICANT CHANGE UP
HCT VFR BLD CALC: 52.1 % — HIGH (ref 34.5–45)
HGB BLD-MCNC: 15.8 G/DL — HIGH (ref 11.5–15.5)
KETONES UR-MCNC: NEGATIVE — SIGNIFICANT CHANGE UP
LACTATE SERPL-SCNC: 10.7 MMOL/L — CRITICAL HIGH (ref 0.7–2)
LEUKOCYTE ESTERASE UR-ACNC: ABNORMAL
MCHC RBC-ENTMCNC: 30.3 GM/DL — LOW (ref 32–36)
MCHC RBC-ENTMCNC: 30.7 PG — SIGNIFICANT CHANGE UP (ref 27–34)
MCV RBC AUTO: 101.4 FL — HIGH (ref 80–100)
NITRITE UR-MCNC: NEGATIVE — SIGNIFICANT CHANGE UP
NRBC # BLD: 0 /100 WBCS — SIGNIFICANT CHANGE UP (ref 0–0)
PH UR: 5 — SIGNIFICANT CHANGE UP (ref 5–8)
PLATELET # BLD AUTO: 203 K/UL — SIGNIFICANT CHANGE UP (ref 150–400)
PROT UR-MCNC: 30 MG/DL
RBC # BLD: 5.14 M/UL — SIGNIFICANT CHANGE UP (ref 3.8–5.2)
RBC # FLD: 14.1 % — SIGNIFICANT CHANGE UP (ref 10.3–14.5)
RSV RESULT: SIGNIFICANT CHANGE UP
RSV RNA RESP QL NAA+PROBE: SIGNIFICANT CHANGE UP
SP GR SPEC: 1.01 — SIGNIFICANT CHANGE UP (ref 1.01–1.02)
UROBILINOGEN FLD QL: 1 MG/DL
WBC # BLD: 11.41 K/UL — HIGH (ref 3.8–10.5)
WBC # FLD AUTO: 11.41 K/UL — HIGH (ref 3.8–10.5)

## 2019-03-02 PROCEDURE — 99285 EMERGENCY DEPT VISIT HI MDM: CPT

## 2019-03-02 PROCEDURE — 71045 X-RAY EXAM CHEST 1 VIEW: CPT | Mod: 26

## 2019-03-02 RX ORDER — VANCOMYCIN HCL 1 G
1000 VIAL (EA) INTRAVENOUS ONCE
Qty: 0 | Refills: 0 | Status: COMPLETED | OUTPATIENT
Start: 2019-03-02 | End: 2019-03-02

## 2019-03-02 RX ORDER — SODIUM CHLORIDE 9 MG/ML
1200 INJECTION INTRAMUSCULAR; INTRAVENOUS; SUBCUTANEOUS ONCE
Qty: 0 | Refills: 0 | Status: COMPLETED | OUTPATIENT
Start: 2019-03-02 | End: 2019-03-02

## 2019-03-02 RX ADMIN — SODIUM CHLORIDE 1200 MILLILITER(S): 9 INJECTION INTRAMUSCULAR; INTRAVENOUS; SUBCUTANEOUS at 18:10

## 2019-03-02 RX ADMIN — Medication 250 MILLIGRAM(S): at 18:36

## 2019-03-02 RX ADMIN — SODIUM CHLORIDE 1200 MILLILITER(S): 9 INJECTION INTRAMUSCULAR; INTRAVENOUS; SUBCUTANEOUS at 21:56

## 2019-03-02 RX ADMIN — Medication 1000 MILLIGRAM(S): at 19:43

## 2019-03-02 NOTE — ED PROVIDER NOTE - PROGRESS NOTE DETAILS
[place]: Patient signed out by Dr. Mcconnell pending CT. Patient placed on Bipap with no improvement of hypoxia. I was notified that patient later became bradycardic and went into asystole. I was notified, patient  at [id number]. I attempted to contact family but havce not been able to reach anyone. Will try again soon.

## 2019-03-02 NOTE — ED ADULT NURSE NOTE - OBJECTIVE STATEMENT
Received patient unresponsive via EMS from [facility].  Patient code sepsis.  Rectal temp 101.3, BP low and HR elevated

## 2019-03-02 NOTE — ED PROVIDER NOTE - OBJECTIVE STATEMENT
89 yo F blayne from NH for sepsis alert.  Pt. cannot give history to due advanced dementia.  Pt. was found in bed tachycardic and with worsened mental status.   ROS: unobtainable from patient  PMH: advanced dementia, Anxiety, lower back pain,cad, CHF, LBBB,  glaucoma, DM2 HTN, CAD, OA, and macular degeneration; Meds: See EMR; SH: Denies smoking/drinking/drug use 89 yo F blayne from NH ([facility]) for sepsis alert.  Pt. cannot give history to due advanced dementia.  Pt. was found in bed tachycardic and with worsened mental status.   ROS: unobtainable from patient  PMH: advanced dementia, Anxiety, lower back pain,cad, CHF, LBBB,  glaucoma, DM2 HTN, CAD, OA, and macular degeneration; Meds: See EMR; SH: Denies smoking/drinking/drug use

## 2019-03-02 NOTE — ED PROVIDER NOTE - CLINICAL SUMMARY MEDICAL DECISION MAKING FREE TEXT BOX
89 yo F sepsis alert  -basic labs, coags, trop, ckmb, blood cx, ua, cx, lactate, lipase, cxr, ekg, antbx, ns bolus hydration, monitor  -f/u results, reeval

## 2019-03-02 NOTE — ED ADULT NURSE NOTE - NSIMPLEMENTINTERV_GEN_ALL_ED
Implemented All Fall with Harm Risk Interventions:  [place] to call system. Call bell, personal items and telephone within reach. Instruct patient to call for assistance. Room bathroom lighting operational. Non-slip footwear when patient is off stretcher. Physically safe environment: no spills, clutter or unnecessary equipment. Stretcher in lowest position, wheels locked, appropriate side rails in place. Provide visual cue, wrist band, yellow gown, etc. Monitor gait and stability. Monitor for mental status changes and reorient to person, place, and time. Review medications for side effects contributing to fall risk. Reinforce activity limits and safety measures with patient and family. Provide visual clues: red socks.

## 2019-03-02 NOTE — ED PROVIDER NOTE - PHYSICAL EXAMINATION
Vitals: tachy at 130, hypotensive at 78/51  Gen: Awake, sitting up in stretcher, responsive to voice, but can't give history, confused  Head: ncat, perrla, eomi b/l  Neck: supple, no lymphadenopathy, no midline deviation  Heart: rrr, no m/r/g  Lungs: CTA b/l, no rales/ronchi/wheezes  Abd: soft, nontender, non-distended, no rebound or guarding  Ext: no clubbing/cyanosis/edema  Neuro: sensation and muscle strength intact b/l, no focal weakness

## 2019-03-02 NOTE — ED ADULT NURSE NOTE - ED STAT RN HANDOFF DETAILS
Patient temp 101.3, BP low and HR elevated in 130s.  Patient unresponsive .  Patient MOLST form in chart.  Patient is DNR/DNI.  Report given to RANJEET Puente

## 2019-03-02 NOTE — ED PROVIDER NOTE - CARE PLAN
Principal Discharge DX:	Sepsis, due to unspecified organism Principal Discharge DX:	Acute respiratory failure with hypoxia

## 2019-03-03 PROBLEM — I44.7 LEFT BUNDLE-BRANCH BLOCK, UNSPECIFIED: Chronic | Status: ACTIVE | Noted: 2018-12-21

## 2019-03-03 PROBLEM — I10 ESSENTIAL (PRIMARY) HYPERTENSION: Chronic | Status: ACTIVE | Noted: 2018-12-21

## 2019-03-03 PROBLEM — F32.9 MAJOR DEPRESSIVE DISORDER, SINGLE EPISODE, UNSPECIFIED: Chronic | Status: ACTIVE | Noted: 2018-12-21

## 2019-03-03 PROBLEM — R63.0 ANOREXIA: Chronic | Status: ACTIVE | Noted: 2018-12-21

## 2019-03-03 PROBLEM — H35.30 UNSPECIFIED MACULAR DEGENERATION: Chronic | Status: ACTIVE | Noted: 2018-12-21

## 2019-03-03 PROBLEM — F41.9 ANXIETY DISORDER, UNSPECIFIED: Chronic | Status: ACTIVE | Noted: 2018-12-21

## 2019-03-03 LAB
-  COAGULASE NEGATIVE STAPHYLOCOCCUS: SIGNIFICANT CHANGE UP
-  STREPTOCOCCUS SP. (NOT GRP A, B OR S PNEUMONIAE): SIGNIFICANT CHANGE UP
CULTURE RESULTS: SIGNIFICANT CHANGE UP
GRAM STN FLD: SIGNIFICANT CHANGE UP
METHOD TYPE: SIGNIFICANT CHANGE UP
SPECIMEN SOURCE: SIGNIFICANT CHANGE UP
SPECIMEN SOURCE: SIGNIFICANT CHANGE UP

## 2019-03-04 LAB
CULTURE RESULTS: SIGNIFICANT CHANGE UP
GRAM STN FLD: SIGNIFICANT CHANGE UP
GRAM STN FLD: SIGNIFICANT CHANGE UP
ORGANISM # SPEC MICROSCOPIC CNT: SIGNIFICANT CHANGE UP
ORGANISM # SPEC MICROSCOPIC CNT: SIGNIFICANT CHANGE UP
SPECIMEN SOURCE: SIGNIFICANT CHANGE UP
SPECIMEN SOURCE: SIGNIFICANT CHANGE UP

## 2019-03-05 LAB
CULTURE RESULTS: SIGNIFICANT CHANGE UP
GRAM STN FLD: SIGNIFICANT CHANGE UP
SPECIMEN SOURCE: SIGNIFICANT CHANGE UP

## 2019-09-09 NOTE — SWALLOW BEDSIDE ASSESSMENT ADULT - H & P REVIEW
Pt started oral Flagyl on Friday for BV detected on pap. Pt took med for 3 days and reports abdominal cramping and nausea. Pt requesting vaginal treatment. Metrogel ordered x 5 nights. Patient verbalized understanding. yes

## 2020-01-31 NOTE — ED ADULT NURSE REASSESSMENT NOTE - NS ED NURSE REASSESS COMMENT FT1
I called and left Bhavya WADDELL asking for her to give us a call back in regards to getting scheduled during our resident cosmetic training next Wednesday for PDL( spider telangiectasia) . If Bhavya is available she can come in 2/5/20 @ 2PM  
IV access to hand found out. The left AC 20 G patent
Pt is awake and tries to follow commands , slow to respond but appropriate, single words answers. Pt having Consult done for  speech  swallow evaluation.
Pt endorsed off going nurse she is sleeping accusable to voice. She has 2 IV access left AC 20 G and right dorsal hand. She is on the monitor widen QRS and irregular. IVF infusing no swelling right dorsal hand

## 2021-01-08 NOTE — DIETITIAN INITIAL EVALUATION ADULT. - PROBLEM SELECTOR PROBLEM 3
Recent PHQ 2/9 Score    PHQ 2:  Date Adult PHQ 2 Score Adult PHQ 2 Interpretation   1/8/2021 0 No further screening needed       PHQ 9:     There are no preventive care reminders to display for this patient.    Patient is up to date, no discussion needed.             WADE (acute kidney injury)

## 2021-09-10 NOTE — ED ADULT NURSE NOTE - PAIN RATING/NUMBER SCALE (0-10): REST
0 Graft Placement Text (Optional-Leave Blank If You Don't Want Separate From The Placement Text In The Library): After full thickness skin graft was sutured into place, mupirocin ointment was applied followed by a bolster dressing of xeroform.

## 2022-02-16 NOTE — ED PROVIDER NOTE - NS ED ATTENDING STATEMENT MOD
Detail Level: Detailed Attending Only Hide Include Location In Plan Question?: No Detail Level: Zone
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
## 2022-06-15 NOTE — DISCHARGE NOTE ADULT - PRINCIPAL DIAGNOSIS
Viral URI with cough Bexarotene Pregnancy And Lactation Text: This medication is Pregnancy Category X and should not be given to women who are pregnant or may become pregnant. This medication should not be used if you are breast feeding.

## 2023-03-16 NOTE — ED ADULT TRIAGE NOTE - CCCP TRG CHIEF CMPLNT
Abdomen , soft, nontender, nondistended , no guarding or rigidity , no masses palpable , normal bowel sounds , Liver and Spleen: no hepatosplenomegaly
fall

## 2023-04-11 NOTE — PATIENT PROFILE ADULT - [id number]
Time: 12:32 PM EDT  Date of encounter:  4/11/2023  Independent Historian/Clinical History and Information was obtained by:   Patient  Chief Complaint: Leg Swelling    History is limited by: N/A    History of Present Illness:  Patient is a 68 y.o. year old male who presents to the emergency department for evaluation of leg swelling. Patient presents with left leg swelling/redness, groin pain that began a week ago. He states that his left thigh a week ago became red, spread to left leg. Currently, left leg is swell and red. He states he went to PCP yesterday and received Doxycycline, told him to come to ER if he did not feel better today. Patient also c/o numbness/tingling. He denies fevers, chills, SOB, chest pain. He states the swelling makes it hard to sleep, does not feel too painful. He has no history of blood clots.     HPI    Patient Care Team  Primary Care Provider: Talha Valle APRN    Past Medical History:     No Known Allergies  Past Medical History:   Diagnosis Date   • BPH (benign prostatic hyperplasia)    • Hyperlipidemia    • Hypertension      Past Surgical History:   Procedure Laterality Date   • KNEE SURGERY Left 2019     History reviewed. No pertinent family history.    Home Medications:  Prior to Admission medications    Medication Sig Start Date End Date Taking? Authorizing Provider   amitriptyline (ELAVIL) 25 MG tablet amitriptyline 25 mg oral tablet take 1 tablet (25 mg) by oral route once daily at bedtime   Active    Provider, MD Jun   amLODIPine (NORVASC) 10 MG tablet amlodipine 10 mg oral tablet take 1 tablet (10 mg) by oral route once daily   Active    Provider, MD Jun   aspirin 81 MG EC tablet aspirin 81 mg oral tablet,delayed release (DR/EC) take 1 tablet (81 mg) by oral route once daily   Active    Provider, MD Jun   atorvastatin (LIPITOR) 20 MG tablet atorvastatin 20 mg oral tablet take 1 tablet (20 mg) by oral route once daily   Active    Provider, Jun  "MD   doxycycline (ADOXA) 100 MG tablet  4/10/23   ProviderJun MD   omeprazole (priLOSEC) 40 MG capsule omeprazole 40 mg oral capsule,delayed release(DR/EC) take 1 capsule (40 mg) by oral route 3 times per day before a meal   Active    Provider, MD Jun   tamsulosin (FLOMAX) 0.4 MG capsule 24 hr capsule  2/27/23   Provider, MD Jun        Social History:   Social History     Tobacco Use   • Smoking status: Never   • Smokeless tobacco: Never   Vaping Use   • Vaping Use: Never used   Substance Use Topics   • Alcohol use: Yes     Comment: socially   • Drug use: Defer         Review of Systems:  Review of Systems   Constitutional: Negative for chills and fever.   HENT: Negative for sore throat.    Eyes: Negative for photophobia.   Respiratory: Negative for shortness of breath.    Cardiovascular: Negative for chest pain.   Gastrointestinal: Negative for abdominal pain, diarrhea, nausea and vomiting.   Genitourinary: Negative for dysuria.   Musculoskeletal: Negative for neck pain.   Skin: Positive for color change (Left Leg Redness). Negative for wound.   Neurological: Positive for numbness. Negative for headaches.   All other systems reviewed and are negative.       Physical Exam:  /70   Pulse 78   Temp 97.8 °F (36.6 °C) (Oral)   Resp 20   Ht 180.3 cm (71\")   Wt 105 kg (231 lb 7.7 oz)   SpO2 93%   BMI 32.29 kg/m²     Physical Exam  Vitals and nursing note reviewed.   Constitutional:       General: He is not in acute distress.  HENT:      Head: Normocephalic and atraumatic.   Eyes:      Extraocular Movements: Extraocular movements intact.   Cardiovascular:      Rate and Rhythm: Normal rate and regular rhythm.   Pulmonary:      Effort: Pulmonary effort is normal. No respiratory distress.      Breath sounds: Normal breath sounds.   Abdominal:      General: Abdomen is flat.      Palpations: Abdomen is soft.      Tenderness: There is no abdominal tenderness.   Musculoskeletal:         " General: Normal range of motion.      Cervical back: Normal range of motion and neck supple.      Left lower leg: Swelling (Extreme) and tenderness present.      Comments: Left Leg Warm to Touch   Skin:     General: Skin is warm and dry.      Capillary Refill: Capillary refill takes less than 2 seconds.      Findings: Erythema (Upper Left Thigh, Spread to left leg) present.   Neurological:      Mental Status: He is alert and oriented to person, place, and time. Mental status is at baseline.                  Procedures:  Procedures      Medical Decision Making:      Comorbidities that affect care:    Hyperlipidemia, Hypertension    External Notes reviewed:    None      The following orders were placed and all results were independently analyzed by me:  Orders Placed This Encounter   Procedures   • Blood Culture - Blood,   • Blood Culture - Blood,   • Comprehensive Metabolic Panel   • Lactic Acid, Plasma   • CBC Auto Differential   • Inpatient Hospitalist Consult   • CBC & Differential       Medications Given in the Emergency Department:  Medications   vancomycin IVPB 2000 mg in 0.9% Sodium Chloride (premix) 500 mL (2,000 mg Intravenous New Bag 4/11/23 1421)        ED Course:         Labs:    Lab Results (last 24 hours)     Procedure Component Value Units Date/Time    CBC & Differential [[id number]]  (Abnormal) Collected: 04/11/23 1232    Specimen: Blood Updated: 04/11/23 1244    Narrative:      The following orders were created for panel order CBC & Differential.  Procedure                               Abnormality         Status                     ---------                               -----------         ------                     CBC Auto Differential[[id number]]        Abnormal            Final result                 Please view results for these tests on the individual orders.    Comprehensive Metabolic Panel [[id number]]  (Abnormal) Collected: 04/11/23 1232    Specimen: Blood Updated: 04/11/23 1308     Glucose  128 mg/dL      BUN 15 mg/dL      Creatinine 0.88 mg/dL      Sodium 138 mmol/L      Potassium 3.7 mmol/L      Chloride 101 mmol/L      CO2 24.3 mmol/L      Calcium 9.7 mg/dL      Total Protein 7.4 g/dL      Albumin 3.8 g/dL      ALT (SGPT) 32 U/L      AST (SGOT) 29 U/L      Alkaline Phosphatase 107 U/L      Total Bilirubin 0.7 mg/dL      Globulin 3.6 gm/dL      A/G Ratio 1.1 g/dL      BUN/Creatinine Ratio 17.0     Anion Gap 12.7 mmol/L      eGFR 93.7 mL/min/1.73     Narrative:      GFR Normal >60  Chronic Kidney Disease <60  Kidney Failure <15      Lactic Acid, Plasma [[id number]]  (Normal) Collected: 04/11/23 1232    Specimen: Blood Updated: 04/11/23 1305     Lactate 1.5 mmol/L     Blood Culture - Blood, Arm, Left [[id number]] Collected: 04/11/23 1232    Specimen: Blood from Arm, Left Updated: 04/11/23 1240    CBC Auto Differential [[id number]]  (Abnormal) Collected: 04/11/23 1232    Specimen: Blood Updated: 04/11/23 1244     WBC 11.23 10*3/mm3      RBC 4.37 10*6/mm3      Hemoglobin 13.6 g/dL      Hematocrit 39.5 %      MCV 90.4 fL      MCH 31.1 pg      MCHC 34.4 g/dL      RDW 12.7 %      RDW-SD 42.2 fl      MPV 12.3 fL      Platelets 165 10*3/mm3      Neutrophil % 69.7 %      Lymphocyte % 19.9 %      Monocyte % 7.3 %      Eosinophil % 0.9 %      Basophil % 0.5 %      Immature Grans % 1.7 %      Neutrophils, Absolute 7.83 10*3/mm3      Lymphocytes, Absolute 2.23 10*3/mm3      Monocytes, Absolute 0.82 10*3/mm3      Eosinophils, Absolute 0.10 10*3/mm3      Basophils, Absolute 0.06 10*3/mm3      Immature Grans, Absolute 0.19 10*3/mm3      nRBC 0.0 /100 WBC            Imaging:    No Radiology Exams Resulted Within Past 24 Hours      Differential Diagnosis and Discussion:    Extremity Pain: Differential diagnosis includes but is not limited to soft tissue sprain, tendonitis, tendon injury, dislocation, fracture, deep vein thrombosis, arterial insufficiency, osteoarthritis, bursitis, and ligamentous damage.    All labs were  reviewed and interpreted by me.  Ultrasound interpretation was reviewed by me.     MDM   68-year-old male patient presented with swelling, redness and pain to his left leg x1 week.  He saw his primary care provider yesterday and was started on doxycycline but presents today to the ED due to worsening of symptoms.  Patient's ultrasound was negative for DVT.  The patient's cellulitis is extensive and he was started on IV antibiotics and will be admitted to the hospital service.  Blood cultures are pending.      Patient Care Considerations:        Consultants/Shared Management Plan:    Hospitalist: I have discussed the case with Dr. Shepherd who agrees to accept the patient for admission.    Social Determinants of Health:    Patient is independent, reliable, and has access to care.       Disposition and Care Coordination:    Admit:   Through independent evaluation of the patient's history, physical, and imperical data, the patient meets criteria for observation/admission to the hospital.        Final diagnoses:   Cellulitis of left lower extremity        ED Disposition     ED Disposition   Decision to Admit    Condition   --    Comment   --           Documentation assistance provided by Shiraz Aguilar acting as scribe for Shiraz Bustillos DO. Information recorded by the scribe was done at my direction and has been verified and validated by me.     This medical record created using voice recognition software.           Shiraz Aguilar  04/11/23 7505       Shiraz Bustillos DO  04/11/23 6876     stage II

## 2023-06-30 NOTE — PATIENT PROFILE ADULT - FUNCTIONAL SCREEN CURRENT LEVEL: EATING, MLM
[Sclera] : the sclera and conjunctiva were normal [PERRL With Normal Accommodation] : pupils were equal in size, round, and reactive to light [Neck Appearance] : the appearance of the neck was normal [Respiration, Rhythm And Depth] : normal respiratory rhythm and effort [Auscultation Breath Sounds / Voice Sounds] : lungs were clear to auscultation bilaterally [Heart Rate And Rhythm] : heart rate was normal and rhythm regular [Heart Sounds] : normal S1 and S2 [Examination Of The Chest] : the chest was normal in appearance [2+] : left 2+ [Superficial] : superficial [Bowel Sounds] : normal bowel sounds [Abdomen Soft] : soft [Abdomen Tenderness] : non-tender [Cervical Lymph Nodes Enlarged Posterior Bilaterally] : posterior cervical [Cervical Lymph Nodes Enlarged Anterior Bilaterally] : anterior cervical [Abnormal Walk] : normal gait [Skin Color & Pigmentation] : normal skin color and pigmentation [Skin Turgor] : normal skin turgor [] : no rash [No Focal Deficits] : no focal deficits [Oriented To Time, Place, And Person] : oriented to person, place, and time [Impaired Insight] : insight and judgment were intact [Affect] : the affect was normal [Mood] : the mood was normal [FreeTextEntry1] : low diastolic murmur [Right Carotid Bruit] : no bruit heard over the right carotid [Left Carotid Bruit] : no bruit heard over the left carotid [FreeTextEntry2] : no edema 2 = assistive person

## 2023-11-10 NOTE — ED ADULT NURSE NOTE - CAS TRG GEN SKIN COLOR
Patient to room from lobby with steady gait. Changed into gown, connected to monitor.ERP at bedside.   Normal for race

## 2024-01-24 NOTE — ED PROVIDER NOTE - OBJECTIVE STATEMENT
Pertinent PMH/PSH/FHx/SHx and Review of Systems contained within:  Patient presents to the ED from [facility] for dry cough and respiratory distress.  Patient does not provide any history.  Patient is laying in the ER on her side, rousable and responsive.  Patient's saturation in triage is 89%.  Patient is not seen coughing in the ER.     Relevant PMHx/SHx/SOCHx/FAMH:  HTN, HLD, GERD, arthritis, anorexia  Unknown if former smoker
Statement Selected

## 2024-05-23 NOTE — ED ADULT NURSE NOTE - DISCHARGE DATE/TIME
Detail Level: Detailed Depth Of Biopsy: dermis Was A Bandage Applied: Yes Size Of Lesion In Cm: 2.5 X Size Of Lesion In Cm: 0 Biopsy Type: H and E Biopsy Method: Dermablade Anesthesia Type: 1% lidocaine with epinephrine and a 1:10 solution of 8.4% sodium bicarbonate Anesthesia Volume In Cc: 0.5 Hemostasis: Ferric chloride Wound Care: Petrolatum Dressing: bandage Destruction After The Procedure: No Type Of Destruction Used: Curettage Curettage Text: The wound bed was treated with curettage after the biopsy was performed. Cryotherapy Text: The wound bed was treated with cryotherapy after the biopsy was performed. Electrodesiccation Text: The wound bed was treated with electrodesiccation after the biopsy was performed. Electrodesiccation And Curettage Text: The wound bed was treated with electrodesiccation and curettage after the biopsy was performed. Silver Nitrate Text: The wound bed was treated with silver nitrate after the biopsy was performed. Lab: [id number] Lab Facility: [id number] Consent: Written consent was obtained and risks were reviewed including but not limited to scarring, infection, bleeding, scabbing, incomplete removal, nerve damage and allergy to anesthesia. Post-Care Instructions: I reviewed with the patient in detail post-care instructions. Patient is to keep the biopsy site dry overnight, and then apply bacitracin twice daily until healed. Patient may apply hydrogen peroxide soaks to remove any crusting. Notification Instructions: Patient will be notified of biopsy results. However, patient instructed to call the office if not contacted within 2 weeks. Billing Type: Third-Party Bill Information: Selecting Yes will display possible errors in your note based on the variables you have selected. This validation is only offered as a suggestion for you. PLEASE NOTE THAT THE VALIDATION TEXT WILL BE REMOVED WHEN YOU FINALIZE YOUR NOTE. IF YOU WANT TO FAX A PRELIMINARY NOTE YOU WILL NEED TO TOGGLE THIS TO 'NO' IF YOU DO NOT WANT IT IN YOUR FAXED NOTE. 06-Nov-2017 18:49

## 2025-02-13 NOTE — ED PROVIDER NOTE - ENMT, MLM
Airway patent, Nasal mucosa clear. Mouth with normal mucosa. Throat has no vesicles, no oropharyngeal exudates and uvula is midline. no concerns